# Patient Record
Sex: MALE | Race: WHITE | NOT HISPANIC OR LATINO | Employment: OTHER | URBAN - METROPOLITAN AREA
[De-identification: names, ages, dates, MRNs, and addresses within clinical notes are randomized per-mention and may not be internally consistent; named-entity substitution may affect disease eponyms.]

---

## 2017-01-23 ENCOUNTER — OFFICE VISIT - HEALTHEAST (OUTPATIENT)
Dept: CARDIOLOGY | Facility: CLINIC | Age: 67
End: 2017-01-23

## 2017-01-23 ENCOUNTER — AMBULATORY - HEALTHEAST (OUTPATIENT)
Dept: CARDIOLOGY | Facility: CLINIC | Age: 67
End: 2017-01-23

## 2017-01-23 DIAGNOSIS — I10 ESSENTIAL HYPERTENSION WITH GOAL BLOOD PRESSURE LESS THAN 130/80: ICD-10-CM

## 2017-01-23 DIAGNOSIS — Z95.0 PACEMAKER: ICD-10-CM

## 2017-01-23 DIAGNOSIS — E78.00 PURE HYPERCHOLESTEROLEMIA: ICD-10-CM

## 2017-01-23 ASSESSMENT — MIFFLIN-ST. JEOR: SCORE: 1791.12

## 2017-02-02 ENCOUNTER — RECORDS - HEALTHEAST (OUTPATIENT)
Dept: ADMINISTRATIVE | Facility: OTHER | Age: 67
End: 2017-02-02

## 2017-02-14 ENCOUNTER — RECORDS - HEALTHEAST (OUTPATIENT)
Dept: ADMINISTRATIVE | Facility: OTHER | Age: 67
End: 2017-02-14

## 2017-02-28 ENCOUNTER — OFFICE VISIT - HEALTHEAST (OUTPATIENT)
Dept: INTERNAL MEDICINE | Facility: CLINIC | Age: 67
End: 2017-02-28

## 2017-02-28 DIAGNOSIS — N28.9 RENAL INSUFFICIENCY, MILD: ICD-10-CM

## 2017-02-28 DIAGNOSIS — I10 ESSENTIAL HYPERTENSION WITH GOAL BLOOD PRESSURE LESS THAN 130/80: ICD-10-CM

## 2017-02-28 DIAGNOSIS — E11.9 DM (DIABETES MELLITUS), TYPE 2 (H): ICD-10-CM

## 2017-02-28 LAB — HBA1C MFR BLD: 6.3 % (ref 3.5–6)

## 2017-03-01 ENCOUNTER — RECORDS - HEALTHEAST (OUTPATIENT)
Dept: ADMINISTRATIVE | Facility: OTHER | Age: 67
End: 2017-03-01

## 2017-04-24 ENCOUNTER — COMMUNICATION - HEALTHEAST (OUTPATIENT)
Dept: INTERNAL MEDICINE | Facility: CLINIC | Age: 67
End: 2017-04-24

## 2017-04-24 ENCOUNTER — AMBULATORY - HEALTHEAST (OUTPATIENT)
Dept: CARDIOLOGY | Facility: CLINIC | Age: 67
End: 2017-04-24

## 2017-04-24 DIAGNOSIS — E78.00 PURE HYPERCHOLESTEROLEMIA: ICD-10-CM

## 2017-04-24 DIAGNOSIS — I10 ESSENTIAL HYPERTENSION: ICD-10-CM

## 2017-04-24 DIAGNOSIS — Z95.0 PACEMAKER: ICD-10-CM

## 2017-07-07 ENCOUNTER — OFFICE VISIT - HEALTHEAST (OUTPATIENT)
Dept: FAMILY MEDICINE | Facility: CLINIC | Age: 67
End: 2017-07-07
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
## 2017-07-31 ENCOUNTER — RECORDS - HEALTHEAST (OUTPATIENT)
Dept: ADMINISTRATIVE | Facility: OTHER | Age: 67
End: 2017-07-31

## 2017-07-31 ENCOUNTER — AMBULATORY - HEALTHEAST (OUTPATIENT)
Dept: CARDIOLOGY | Facility: CLINIC | Age: 67
End: 2017-07-31

## 2017-07-31 DIAGNOSIS — Z95.0 PACEMAKER: ICD-10-CM

## 2017-07-31 LAB — HCC DEVICE COMMENTS: NORMAL

## 2017-08-09 ENCOUNTER — RECORDS - HEALTHEAST (OUTPATIENT)
Dept: ADMINISTRATIVE | Facility: OTHER | Age: 67
End: 2017-08-09

## 2017-08-22 ENCOUNTER — RECORDS - HEALTHEAST (OUTPATIENT)
Dept: ADMINISTRATIVE | Facility: OTHER | Age: 67
End: 2017-08-22

## 2017-09-07 ENCOUNTER — COMMUNICATION - HEALTHEAST (OUTPATIENT)
Dept: INTERNAL MEDICINE | Facility: CLINIC | Age: 67
End: 2017-09-07

## 2017-09-07 DIAGNOSIS — E78.00 PURE HYPERCHOLESTEROLEMIA: ICD-10-CM

## 2017-10-27 ENCOUNTER — OFFICE VISIT - HEALTHEAST (OUTPATIENT)
Dept: CARDIOLOGY | Facility: CLINIC | Age: 67
End: 2017-10-27

## 2017-10-27 DIAGNOSIS — I10 ESSENTIAL HYPERTENSION WITH GOAL BLOOD PRESSURE LESS THAN 130/80: ICD-10-CM

## 2017-10-27 DIAGNOSIS — Z95.0 PACEMAKER: ICD-10-CM

## 2017-10-27 ASSESSMENT — MIFFLIN-ST. JEOR: SCORE: 1797.93

## 2017-10-28 ENCOUNTER — AMBULATORY - HEALTHEAST (OUTPATIENT)
Dept: NURSING | Facility: CLINIC | Age: 67
End: 2017-10-28

## 2017-10-28 DIAGNOSIS — Z00.00 HEALTH CARE MAINTENANCE: ICD-10-CM

## 2017-11-06 ENCOUNTER — AMBULATORY - HEALTHEAST (OUTPATIENT)
Dept: CARDIOLOGY | Facility: CLINIC | Age: 67
End: 2017-11-06

## 2017-11-06 DIAGNOSIS — Z95.0 PACEMAKER: ICD-10-CM

## 2017-11-07 LAB — HCC DEVICE COMMENTS: NORMAL

## 2017-11-27 ENCOUNTER — AMBULATORY - HEALTHEAST (OUTPATIENT)
Dept: CARDIOLOGY | Facility: CLINIC | Age: 67
End: 2017-11-27

## 2017-11-27 ENCOUNTER — COMMUNICATION - HEALTHEAST (OUTPATIENT)
Dept: CARDIOLOGY | Facility: CLINIC | Age: 67
End: 2017-11-27

## 2017-11-27 DIAGNOSIS — I10 HYPERTENSION: ICD-10-CM

## 2017-11-27 DIAGNOSIS — I10 ESSENTIAL HYPERTENSION: ICD-10-CM

## 2017-11-30 ENCOUNTER — OFFICE VISIT - HEALTHEAST (OUTPATIENT)
Dept: INTERNAL MEDICINE | Facility: CLINIC | Age: 67
End: 2017-11-30

## 2017-11-30 DIAGNOSIS — I10 ESSENTIAL HYPERTENSION WITH GOAL BLOOD PRESSURE LESS THAN 130/80: ICD-10-CM

## 2017-11-30 DIAGNOSIS — N40.1 BENIGN PROSTATIC HYPERPLASIA WITH URINARY OBSTRUCTION: ICD-10-CM

## 2017-11-30 DIAGNOSIS — E78.00 PURE HYPERCHOLESTEROLEMIA: ICD-10-CM

## 2017-11-30 DIAGNOSIS — Z00.00 ROUTINE GENERAL MEDICAL EXAMINATION AT A HEALTH CARE FACILITY: ICD-10-CM

## 2017-11-30 DIAGNOSIS — E11.9 DM (DIABETES MELLITUS), TYPE 2 (H): ICD-10-CM

## 2017-11-30 DIAGNOSIS — N13.8 BENIGN PROSTATIC HYPERPLASIA WITH URINARY OBSTRUCTION: ICD-10-CM

## 2017-11-30 DIAGNOSIS — Z95.0 PACEMAKER: ICD-10-CM

## 2017-11-30 DIAGNOSIS — R31.9 HEMATURIA: ICD-10-CM

## 2017-11-30 DIAGNOSIS — N20.0 NEPHROLITHIASIS: ICD-10-CM

## 2017-11-30 LAB — HBA1C MFR BLD: 6.4 % (ref 3.5–6)

## 2017-11-30 ASSESSMENT — MIFFLIN-ST. JEOR: SCORE: 1768.75

## 2017-12-01 ENCOUNTER — COMMUNICATION - HEALTHEAST (OUTPATIENT)
Dept: INTERNAL MEDICINE | Facility: CLINIC | Age: 67
End: 2017-12-01

## 2018-01-14 ENCOUNTER — COMMUNICATION - HEALTHEAST (OUTPATIENT)
Dept: INTERNAL MEDICINE | Facility: CLINIC | Age: 68
End: 2018-01-14

## 2018-01-17 ENCOUNTER — COMMUNICATION - HEALTHEAST (OUTPATIENT)
Dept: INTERNAL MEDICINE | Facility: CLINIC | Age: 68
End: 2018-01-17

## 2018-01-17 DIAGNOSIS — E11.9 DM (DIABETES MELLITUS), TYPE 2 (H): ICD-10-CM

## 2018-02-06 ENCOUNTER — AMBULATORY - HEALTHEAST (OUTPATIENT)
Dept: CARDIOLOGY | Facility: CLINIC | Age: 68
End: 2018-02-06

## 2018-02-06 DIAGNOSIS — Z95.0 CARDIAC PACEMAKER IN SITU: ICD-10-CM

## 2018-02-06 LAB — HCC DEVICE COMMENTS: NORMAL

## 2018-02-06 ASSESSMENT — MIFFLIN-ST. JEOR: SCORE: 1737.25

## 2018-02-08 ENCOUNTER — COMMUNICATION - HEALTHEAST (OUTPATIENT)
Dept: INTERNAL MEDICINE | Facility: CLINIC | Age: 68
End: 2018-02-08

## 2018-02-08 DIAGNOSIS — E11.9 DM (DIABETES MELLITUS), TYPE 2 (H): ICD-10-CM

## 2018-02-09 ENCOUNTER — AMBULATORY - HEALTHEAST (OUTPATIENT)
Dept: CARDIOLOGY | Facility: CLINIC | Age: 68
End: 2018-02-09

## 2018-02-09 ENCOUNTER — COMMUNICATION - HEALTHEAST (OUTPATIENT)
Dept: CARDIOLOGY | Facility: CLINIC | Age: 68
End: 2018-02-09

## 2018-02-09 DIAGNOSIS — R94.31 ABNORMAL ELECTROCARDIOGRAM: ICD-10-CM

## 2018-02-09 DIAGNOSIS — Z95.0 PACEMAKER: ICD-10-CM

## 2018-02-12 ENCOUNTER — HOSPITAL ENCOUNTER (OUTPATIENT)
Dept: CARDIOLOGY | Facility: CLINIC | Age: 68
Discharge: HOME | End: 2018-02-12
Attending: INTERNAL MEDICINE

## 2018-02-12 DIAGNOSIS — Z95.0 PACEMAKER: ICD-10-CM

## 2018-02-12 DIAGNOSIS — R94.31 ABNORMAL ELECTROCARDIOGRAM: ICD-10-CM

## 2018-02-12 LAB
CV STRESS CURRENT BP HE: NORMAL
CV STRESS CURRENT HR HE: 103
CV STRESS CURRENT HR HE: 104
CV STRESS CURRENT HR HE: 106
CV STRESS CURRENT HR HE: 107
CV STRESS CURRENT HR HE: 118
CV STRESS CURRENT HR HE: 118
CV STRESS CURRENT HR HE: 121
CV STRESS CURRENT HR HE: 121
CV STRESS CURRENT HR HE: 124
CV STRESS CURRENT HR HE: 128
CV STRESS CURRENT HR HE: 128
CV STRESS CURRENT HR HE: 137
CV STRESS CURRENT HR HE: 138
CV STRESS CURRENT HR HE: 138
CV STRESS CURRENT HR HE: 75
CV STRESS CURRENT HR HE: 78
CV STRESS CURRENT HR HE: 79
CV STRESS CURRENT HR HE: 79
CV STRESS CURRENT HR HE: 81
CV STRESS CURRENT HR HE: 82
CV STRESS CURRENT HR HE: 82
CV STRESS CURRENT HR HE: 86
CV STRESS CURRENT HR HE: 88
CV STRESS CURRENT HR HE: 88
CV STRESS CURRENT HR HE: 93
CV STRESS CURRENT HR HE: 94
CV STRESS CURRENT HR HE: 97
CV STRESS DEVIATION TIME HE: NORMAL
CV STRESS ECHO PERCENT HR HE: NORMAL
CV STRESS EXERCISE STAGE HE: NORMAL
CV STRESS FINAL RESTING BP HE: NORMAL
CV STRESS FINAL RESTING HR HE: 88
CV STRESS MAX HR HE: 140
CV STRESS MAX TREADMILL GRADE HE: 14
CV STRESS MAX TREADMILL SPEED HE: 3.4
CV STRESS PEAK DIA BP HE: NORMAL
CV STRESS PEAK SYS BP HE: NORMAL
CV STRESS PHASE HE: NORMAL
CV STRESS PROTOCOL HE: NORMAL
CV STRESS RESTING PT POSITION HE: NORMAL
CV STRESS RESTING PT POSITION HE: NORMAL
CV STRESS ST DEVIATION AMOUNT HE: NORMAL
CV STRESS ST DEVIATION ELEVATION HE: NORMAL
CV STRESS ST EVELATION AMOUNT HE: NORMAL
CV STRESS TEST TYPE HE: NORMAL
CV STRESS TOTAL STAGE TIME MIN 1 HE: NORMAL
ECHO EJECTION FRACTION ESTIMATED: 55 %
STRESS ECHO BASELINE BP: NORMAL
STRESS ECHO BASELINE HR: 75
STRESS ECHO CALCULATED PERCENT HR: 93 %
STRESS ECHO LAST STRESS BP: NORMAL
STRESS ECHO LAST STRESS HR: 138
STRESS ECHO POST ESTIMATED WORKLOAD: 9.1
STRESS ECHO POST EXERCISE DUR MIN: 7
STRESS ECHO POST EXERCISE DUR SEC: 30
STRESS ECHO TARGET HR: 128

## 2018-02-18 ENCOUNTER — AMBULATORY - HEALTHEAST (OUTPATIENT)
Dept: CARDIOLOGY | Facility: CLINIC | Age: 68
End: 2018-02-18

## 2018-02-19 ENCOUNTER — COMMUNICATION - HEALTHEAST (OUTPATIENT)
Dept: CARDIOLOGY | Facility: CLINIC | Age: 68
End: 2018-02-19

## 2018-02-19 DIAGNOSIS — I10 HTN (HYPERTENSION): ICD-10-CM

## 2018-02-26 ENCOUNTER — RECORDS - HEALTHEAST (OUTPATIENT)
Dept: ADMINISTRATIVE | Facility: OTHER | Age: 68
End: 2018-02-26

## 2018-02-27 ENCOUNTER — COMMUNICATION - HEALTHEAST (OUTPATIENT)
Dept: INTERNAL MEDICINE | Facility: CLINIC | Age: 68
End: 2018-02-27

## 2018-03-19 ENCOUNTER — COMMUNICATION - HEALTHEAST (OUTPATIENT)
Dept: CARDIOLOGY | Facility: CLINIC | Age: 68
End: 2018-03-19

## 2018-03-19 DIAGNOSIS — I10 HTN (HYPERTENSION): ICD-10-CM

## 2018-03-26 ENCOUNTER — COMMUNICATION - HEALTHEAST (OUTPATIENT)
Dept: CARDIOLOGY | Facility: CLINIC | Age: 68
End: 2018-03-26

## 2018-03-30 ENCOUNTER — OFFICE VISIT - HEALTHEAST (OUTPATIENT)
Dept: INTERNAL MEDICINE | Facility: CLINIC | Age: 68
End: 2018-03-30

## 2018-03-30 DIAGNOSIS — I10 ESSENTIAL HYPERTENSION WITH GOAL BLOOD PRESSURE LESS THAN 130/80: ICD-10-CM

## 2018-03-30 DIAGNOSIS — E11.9 DM (DIABETES MELLITUS), TYPE 2 (H): ICD-10-CM

## 2018-03-30 LAB — HBA1C MFR BLD: 6 % (ref 3.5–6)

## 2018-04-24 ENCOUNTER — OFFICE VISIT - HEALTHEAST (OUTPATIENT)
Dept: CARDIOLOGY | Facility: CLINIC | Age: 68
End: 2018-04-24

## 2018-04-24 DIAGNOSIS — I51.7 RIGHT VENTRICULAR ENLARGEMENT: ICD-10-CM

## 2018-04-24 DIAGNOSIS — Z95.0 CARDIAC PACEMAKER IN SITU: ICD-10-CM

## 2018-04-24 DIAGNOSIS — I10 ESSENTIAL HYPERTENSION WITH GOAL BLOOD PRESSURE LESS THAN 130/80: ICD-10-CM

## 2018-04-24 ASSESSMENT — MIFFLIN-ST. JEOR: SCORE: 1723.65

## 2018-04-26 ENCOUNTER — COMMUNICATION - HEALTHEAST (OUTPATIENT)
Dept: INTERNAL MEDICINE | Facility: CLINIC | Age: 68
End: 2018-04-26

## 2018-04-26 DIAGNOSIS — E11.9 DM (DIABETES MELLITUS), TYPE 2 (H): ICD-10-CM

## 2018-05-01 ENCOUNTER — AMBULATORY - HEALTHEAST (OUTPATIENT)
Dept: CARDIOLOGY | Facility: CLINIC | Age: 68
End: 2018-05-01

## 2018-05-01 DIAGNOSIS — Z95.0 CARDIAC PACEMAKER IN SITU: ICD-10-CM

## 2018-05-01 LAB
HCC DEVICE COMMENTS: NORMAL
HCC DEVICE IMPLANTING PROVIDER: NORMAL
HCC DEVICE MANUFACTURE: NORMAL
HCC DEVICE MODEL: NORMAL
HCC DEVICE SERIAL NUMBER: NORMAL
HCC DEVICE TYPE: NORMAL

## 2018-05-07 ENCOUNTER — HOSPITAL ENCOUNTER (OUTPATIENT)
Dept: CARDIOLOGY | Facility: CLINIC | Age: 68
Discharge: HOME | End: 2018-05-07
Attending: INTERNAL MEDICINE

## 2018-05-07 DIAGNOSIS — I51.7 RIGHT VENTRICULAR ENLARGEMENT: ICD-10-CM

## 2018-05-07 LAB
AORTIC ROOT: 4.1 CM
ASCENDING AORTA: 4 CM
BSA FOR ECHO PROCEDURE: 2.19 M2
CV BLOOD PRESSURE: NORMAL MMHG
CV ECHO HEIGHT: 69.3 IN
CV ECHO WEIGHT: 217 LBS
DOP CALC LVOT AREA: 4.15 CM2
DOP CALC LVOT DIAMETER: 2.3 CM
DOP CALC LVOT PEAK VEL: 67.7 CM/S
DOP CALC LVOT STROKE VOLUME: 70.6 CM3
DOP CALCLVOT PEAK VEL VTI: 17 CM
EJECTION FRACTION: 52 % (ref 55–75)
FRACTIONAL SHORTENING: 33.5 % (ref 28–44)
INTERVENTRICULAR SEPTUM IN END DIASTOLE: 1.05 CM (ref 0.6–1)
IVS/PW RATIO: 1
LA AREA 1: 21.6 CM2
LA AREA 2: 18.1 CM2
LEFT ATRIUM LENGTH: 5.55 CM
LEFT ATRIUM SIZE: 3.6 CM
LEFT ATRIUM TO AORTIC ROOT RATIO: 0.88 NO UNITS
LEFT ATRIUM VOLUME INDEX: 27.3 ML/M2
LEFT ATRIUM VOLUME: 59.9 ML
LEFT VENTRICLE CARDIAC INDEX: 1.9 L/MIN/M2
LEFT VENTRICLE CARDIAC OUTPUT: 4.2 L/MIN
LEFT VENTRICLE DIASTOLIC VOLUME INDEX: 37.4 CM3/M2 (ref 34–74)
LEFT VENTRICLE DIASTOLIC VOLUME: 82 CM3 (ref 62–150)
LEFT VENTRICLE HEART RATE: 60 BPM
LEFT VENTRICLE MASS INDEX: 73.9 G/M2
LEFT VENTRICLE SYSTOLIC VOLUME INDEX: 17.8 CM3/M2 (ref 11–31)
LEFT VENTRICLE SYSTOLIC VOLUME: 39 CM3 (ref 21–61)
LEFT VENTRICULAR INTERNAL DIMENSION IN DIASTOLE: 4.48 CM (ref 4.2–5.8)
LEFT VENTRICULAR INTERNAL DIMENSION IN SYSTOLE: 2.98 CM (ref 2.5–4)
LEFT VENTRICULAR MASS: 161.7 G
LEFT VENTRICULAR OUTFLOW TRACT MEAN GRADIENT: 1 MMHG
LEFT VENTRICULAR OUTFLOW TRACT MEAN VELOCITY: 48.7 CM/S
LEFT VENTRICULAR OUTFLOW TRACT PEAK GRADIENT: 2 MMHG
LEFT VENTRICULAR POSTERIOR WALL IN END DIASTOLE: 1.04 CM (ref 0.6–1)
LV STROKE VOLUME INDEX: 32.2 ML/M2
MITRAL VALVE E/A RATIO: 0.5
MV AVERAGE E/E' RATIO: 8.1 CM/S
MV DECELERATION TIME: 394 MS
MV E'TISSUE VEL-LAT: 5.56 CM/S
MV E'TISSUE VEL-MED: 5.46 CM/S
MV LATERAL E/E' RATIO: 8.1
MV MEDIAL E/E' RATIO: 8.2
MV PEAK A VELOCITY: 95.3 CM/S
MV PEAK E VELOCITY: 44.9 CM/S
NUC REST DIASTOLIC VOLUME INDEX: 3472 LBS
NUC REST SYSTOLIC VOLUME INDEX: 69.25 IN
PR MAX PG: 3 MMHG
PR PEAK VELOCITY: 81.4 CM/S
RIGHT VENTRICULAR INTERNAL DIMENSION IN DYSTOLE: 3.08 CM
TRICUSPID VALVE ANULAR PLANE SYSTOLIC EXCURSION: 1.6 CM

## 2018-05-07 ASSESSMENT — MIFFLIN-ST. JEOR: SCORE: 1723.65

## 2018-05-30 ENCOUNTER — COMMUNICATION - HEALTHEAST (OUTPATIENT)
Dept: INTERNAL MEDICINE | Facility: CLINIC | Age: 68
End: 2018-05-30

## 2018-06-14 ENCOUNTER — OFFICE VISIT - HEALTHEAST (OUTPATIENT)
Dept: INTERNAL MEDICINE | Facility: CLINIC | Age: 68
End: 2018-06-14
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
## 2018-06-14 DIAGNOSIS — S30.861A TICK BITE OF ABDOMINAL WALL, INITIAL ENCOUNTER: ICD-10-CM

## 2018-07-18 ENCOUNTER — OFFICE VISIT - HEALTHEAST (OUTPATIENT)
Dept: INTERNAL MEDICINE | Facility: CLINIC | Age: 68
End: 2018-07-18

## 2018-07-18 DIAGNOSIS — E11.9 DM (DIABETES MELLITUS), TYPE 2 (H): ICD-10-CM

## 2018-07-18 DIAGNOSIS — N40.1 BENIGN PROSTATIC HYPERPLASIA WITH URINARY OBSTRUCTION: ICD-10-CM

## 2018-07-18 DIAGNOSIS — Z51.81 MEDICATION MONITORING ENCOUNTER: ICD-10-CM

## 2018-07-18 DIAGNOSIS — Z95.0 CARDIAC PACEMAKER IN SITU: ICD-10-CM

## 2018-07-18 DIAGNOSIS — I10 ESSENTIAL HYPERTENSION WITH GOAL BLOOD PRESSURE LESS THAN 130/80: ICD-10-CM

## 2018-07-18 DIAGNOSIS — Z86.0100 HISTORY OF COLONIC POLYPS: ICD-10-CM

## 2018-07-18 DIAGNOSIS — N13.8 BENIGN PROSTATIC HYPERPLASIA WITH URINARY OBSTRUCTION: ICD-10-CM

## 2018-07-18 DIAGNOSIS — E78.00 PURE HYPERCHOLESTEROLEMIA: ICD-10-CM

## 2018-07-18 LAB
ALBUMIN SERPL-MCNC: 4 G/DL (ref 3.5–5)
ALP SERPL-CCNC: 67 U/L (ref 45–120)
ALT SERPL W P-5'-P-CCNC: 20 U/L (ref 0–45)
ANION GAP SERPL CALCULATED.3IONS-SCNC: 10 MMOL/L (ref 5–18)
AST SERPL W P-5'-P-CCNC: 18 U/L (ref 0–40)
BILIRUB SERPL-MCNC: 0.5 MG/DL (ref 0–1)
BUN SERPL-MCNC: 21 MG/DL (ref 8–28)
CALCIUM SERPL-MCNC: 9.4 MG/DL (ref 8.5–10.5)
CHLORIDE BLD-SCNC: 106 MMOL/L (ref 98–107)
CO2 SERPL-SCNC: 24 MMOL/L (ref 22–31)
CREAT SERPL-MCNC: 1.34 MG/DL (ref 0.7–1.3)
GFR SERPL CREATININE-BSD FRML MDRD: 53 ML/MIN/1.73M2
GLUCOSE BLD-MCNC: 110 MG/DL (ref 70–125)
HBA1C MFR BLD: 6.3 % (ref 3.5–6)
LDLC SERPL CALC-MCNC: 49 MG/DL
POTASSIUM BLD-SCNC: 4.8 MMOL/L (ref 3.5–5)
PROT SERPL-MCNC: 6.6 G/DL (ref 6–8)
SODIUM SERPL-SCNC: 140 MMOL/L (ref 136–145)

## 2018-07-18 ASSESSMENT — MIFFLIN-ST. JEOR: SCORE: 1710.04

## 2018-07-19 ENCOUNTER — COMMUNICATION - HEALTHEAST (OUTPATIENT)
Dept: INTERNAL MEDICINE | Facility: CLINIC | Age: 68
End: 2018-07-19

## 2018-07-31 ENCOUNTER — COMMUNICATION - HEALTHEAST (OUTPATIENT)
Dept: INTERNAL MEDICINE | Facility: CLINIC | Age: 68
End: 2018-07-31

## 2018-07-31 DIAGNOSIS — E11.9 DM (DIABETES MELLITUS), TYPE 2 (H): ICD-10-CM

## 2018-08-06 ENCOUNTER — AMBULATORY - HEALTHEAST (OUTPATIENT)
Dept: CARDIOLOGY | Facility: CLINIC | Age: 68
End: 2018-08-06

## 2018-08-06 DIAGNOSIS — Z95.0 CARDIAC PACEMAKER IN SITU: ICD-10-CM

## 2018-08-10 ENCOUNTER — RECORDS - HEALTHEAST (OUTPATIENT)
Dept: ADMINISTRATIVE | Facility: OTHER | Age: 68
End: 2018-08-10

## 2018-08-17 ENCOUNTER — RECORDS - HEALTHEAST (OUTPATIENT)
Dept: ADMINISTRATIVE | Facility: OTHER | Age: 68
End: 2018-08-17

## 2018-08-18 ENCOUNTER — COMMUNICATION - HEALTHEAST (OUTPATIENT)
Dept: INTERNAL MEDICINE | Facility: CLINIC | Age: 68
End: 2018-08-18

## 2018-08-18 DIAGNOSIS — E78.00 PURE HYPERCHOLESTEROLEMIA: ICD-10-CM

## 2018-08-28 ENCOUNTER — RECORDS - HEALTHEAST (OUTPATIENT)
Dept: ADMINISTRATIVE | Facility: OTHER | Age: 68
End: 2018-08-28

## 2018-10-26 ENCOUNTER — OFFICE VISIT - HEALTHEAST (OUTPATIENT)
Dept: INTERNAL MEDICINE | Facility: CLINIC | Age: 68
End: 2018-10-26

## 2018-10-26 DIAGNOSIS — I10 HTN (HYPERTENSION): ICD-10-CM

## 2018-10-26 DIAGNOSIS — Z00.00 HEALTH CARE MAINTENANCE: ICD-10-CM

## 2018-10-26 DIAGNOSIS — Z85.828 HISTORY OF BASAL CELL CANCER: ICD-10-CM

## 2018-10-26 DIAGNOSIS — Z51.81 MEDICATION MONITORING ENCOUNTER: ICD-10-CM

## 2018-10-26 DIAGNOSIS — E11.9 DM (DIABETES MELLITUS), TYPE 2 (H): ICD-10-CM

## 2018-10-26 DIAGNOSIS — E78.00 PURE HYPERCHOLESTEROLEMIA: ICD-10-CM

## 2018-10-26 DIAGNOSIS — Z86.0100 HISTORY OF COLONIC POLYPS: ICD-10-CM

## 2018-10-26 DIAGNOSIS — I10 ESSENTIAL HYPERTENSION WITH GOAL BLOOD PRESSURE LESS THAN 130/80: ICD-10-CM

## 2018-10-26 DIAGNOSIS — Z00.00 ROUTINE GENERAL MEDICAL EXAMINATION AT A HEALTH CARE FACILITY: ICD-10-CM

## 2018-10-26 LAB
ALBUMIN SERPL-MCNC: 4.1 G/DL (ref 3.5–5)
ALP SERPL-CCNC: 69 U/L (ref 45–120)
ALT SERPL W P-5'-P-CCNC: 24 U/L (ref 0–45)
ANION GAP SERPL CALCULATED.3IONS-SCNC: 11 MMOL/L (ref 5–18)
AST SERPL W P-5'-P-CCNC: 21 U/L (ref 0–40)
BILIRUB SERPL-MCNC: 0.7 MG/DL (ref 0–1)
BUN SERPL-MCNC: 26 MG/DL (ref 8–28)
CALCIUM SERPL-MCNC: 9.3 MG/DL (ref 8.5–10.5)
CHLORIDE BLD-SCNC: 105 MMOL/L (ref 98–107)
CHOLEST SERPL-MCNC: 106 MG/DL
CO2 SERPL-SCNC: 23 MMOL/L (ref 22–31)
CREAT SERPL-MCNC: 1.36 MG/DL (ref 0.7–1.3)
ERYTHROCYTE [DISTWIDTH] IN BLOOD BY AUTOMATED COUNT: 11.8 % (ref 11–14.5)
FASTING STATUS PATIENT QL REPORTED: YES
GFR SERPL CREATININE-BSD FRML MDRD: 52 ML/MIN/1.73M2
GLUCOSE BLD-MCNC: 128 MG/DL (ref 70–125)
HBA1C MFR BLD: 6.2 % (ref 3.5–6)
HCT VFR BLD AUTO: 37.7 % (ref 40–54)
HDLC SERPL-MCNC: 46 MG/DL
HGB BLD-MCNC: 13 G/DL (ref 14–18)
LDLC SERPL CALC-MCNC: 45 MG/DL
MCH RBC QN AUTO: 29.2 PG (ref 27–34)
MCHC RBC AUTO-ENTMCNC: 34.5 G/DL (ref 32–36)
MCV RBC AUTO: 85 FL (ref 80–100)
PLATELET # BLD AUTO: 202 THOU/UL (ref 140–440)
PMV BLD AUTO: 7.9 FL (ref 7–10)
POTASSIUM BLD-SCNC: 4.9 MMOL/L (ref 3.5–5)
PROT SERPL-MCNC: 6.7 G/DL (ref 6–8)
RBC # BLD AUTO: 4.45 MILL/UL (ref 4.4–6.2)
SODIUM SERPL-SCNC: 139 MMOL/L (ref 136–145)
TRIGL SERPL-MCNC: 73 MG/DL
WBC: 6.7 THOU/UL (ref 4–11)

## 2018-10-26 ASSESSMENT — MIFFLIN-ST. JEOR: SCORE: 1708.22

## 2018-10-29 ENCOUNTER — COMMUNICATION - HEALTHEAST (OUTPATIENT)
Dept: INTERNAL MEDICINE | Facility: CLINIC | Age: 68
End: 2018-10-29

## 2018-11-12 ENCOUNTER — AMBULATORY - HEALTHEAST (OUTPATIENT)
Dept: CARDIOLOGY | Facility: CLINIC | Age: 68
End: 2018-11-12

## 2018-11-12 DIAGNOSIS — Z95.0 CARDIAC PACEMAKER IN SITU: ICD-10-CM

## 2018-11-14 ENCOUNTER — COMMUNICATION - HEALTHEAST (OUTPATIENT)
Dept: INTERNAL MEDICINE | Facility: CLINIC | Age: 68
End: 2018-11-14

## 2018-11-14 DIAGNOSIS — E78.00 PURE HYPERCHOLESTEROLEMIA: ICD-10-CM

## 2018-11-14 DIAGNOSIS — E11.9 DM (DIABETES MELLITUS), TYPE 2 (H): ICD-10-CM

## 2018-12-07 ENCOUNTER — OFFICE VISIT - HEALTHEAST (OUTPATIENT)
Dept: FAMILY MEDICINE | Facility: CLINIC | Age: 68
End: 2018-12-07

## 2018-12-07 DIAGNOSIS — M54.41 ACUTE RIGHT-SIDED LOW BACK PAIN WITH RIGHT-SIDED SCIATICA: ICD-10-CM

## 2018-12-13 ENCOUNTER — RECORDS - HEALTHEAST (OUTPATIENT)
Dept: ADMINISTRATIVE | Facility: OTHER | Age: 68
End: 2018-12-13

## 2018-12-13 ENCOUNTER — HOSPITAL ENCOUNTER (OUTPATIENT)
Dept: CT IMAGING | Facility: CLINIC | Age: 68
Discharge: HOME | End: 2018-12-13
Attending: UROLOGY

## 2018-12-13 DIAGNOSIS — R31.0 GROSS HEMATURIA: ICD-10-CM

## 2018-12-13 LAB
CREAT BLD-MCNC: 1.4 MG/DL
POC GFR AMER AF HE - HISTORICAL: >60 ML/MIN/1.73M2
POC GFR NON AMER AF HE - HISTORICAL: 50 ML/MIN/1.73M2

## 2018-12-14 ENCOUNTER — COMMUNICATION - HEALTHEAST (OUTPATIENT)
Dept: CARDIOLOGY | Facility: CLINIC | Age: 68
End: 2018-12-14

## 2018-12-14 DIAGNOSIS — I10 ESSENTIAL HYPERTENSION: ICD-10-CM

## 2019-02-02 ENCOUNTER — OFFICE VISIT - HEALTHEAST (OUTPATIENT)
Dept: FAMILY MEDICINE | Facility: CLINIC | Age: 69
End: 2019-02-02

## 2019-02-02 DIAGNOSIS — H61.21 IMPACTED CERUMEN OF RIGHT EAR: ICD-10-CM

## 2019-02-02 DIAGNOSIS — H60.391 INFECTIVE OTITIS EXTERNA, RIGHT: ICD-10-CM

## 2019-02-26 ENCOUNTER — AMBULATORY - HEALTHEAST (OUTPATIENT)
Dept: CARDIOLOGY | Facility: CLINIC | Age: 69
End: 2019-02-26

## 2019-02-26 ENCOUNTER — OFFICE VISIT - HEALTHEAST (OUTPATIENT)
Dept: INTERNAL MEDICINE | Facility: CLINIC | Age: 69
End: 2019-02-26

## 2019-02-26 DIAGNOSIS — Z86.0100 HISTORY OF COLONIC POLYPS: ICD-10-CM

## 2019-02-26 DIAGNOSIS — Z85.828 HISTORY OF BASAL CELL CANCER: ICD-10-CM

## 2019-02-26 DIAGNOSIS — I10 ESSENTIAL HYPERTENSION: ICD-10-CM

## 2019-02-26 DIAGNOSIS — E78.00 PURE HYPERCHOLESTEROLEMIA: ICD-10-CM

## 2019-02-26 DIAGNOSIS — E11.9 DM (DIABETES MELLITUS), TYPE 2 (H): ICD-10-CM

## 2019-02-26 DIAGNOSIS — Z51.81 MEDICATION MONITORING ENCOUNTER: ICD-10-CM

## 2019-02-26 DIAGNOSIS — Z95.0 CARDIAC PACEMAKER IN SITU: ICD-10-CM

## 2019-02-26 LAB
ALBUMIN SERPL-MCNC: 4.2 G/DL (ref 3.5–5)
ALP SERPL-CCNC: 69 U/L (ref 45–120)
ALT SERPL W P-5'-P-CCNC: 23 U/L (ref 0–45)
ANION GAP SERPL CALCULATED.3IONS-SCNC: 13 MMOL/L (ref 5–18)
AST SERPL W P-5'-P-CCNC: 20 U/L (ref 0–40)
BILIRUB SERPL-MCNC: 0.5 MG/DL (ref 0–1)
BUN SERPL-MCNC: 25 MG/DL (ref 8–28)
CALCIUM SERPL-MCNC: 9.5 MG/DL (ref 8.5–10.5)
CHLORIDE BLD-SCNC: 105 MMOL/L (ref 98–107)
CO2 SERPL-SCNC: 22 MMOL/L (ref 22–31)
CREAT SERPL-MCNC: 1.52 MG/DL (ref 0.7–1.3)
GFR SERPL CREATININE-BSD FRML MDRD: 46 ML/MIN/1.73M2
GLUCOSE BLD-MCNC: 133 MG/DL (ref 70–125)
HBA1C MFR BLD: 6 % (ref 3.5–6)
HCC DEVICE COMMENTS: NORMAL
HCC DEVICE IMPLANTING PROVIDER: NORMAL
HCC DEVICE MANUFACTURE: NORMAL
HCC DEVICE MODEL: NORMAL
HCC DEVICE SERIAL NUMBER: NORMAL
HCC DEVICE TYPE: NORMAL
POTASSIUM BLD-SCNC: 4.7 MMOL/L (ref 3.5–5)
PROT SERPL-MCNC: 7 G/DL (ref 6–8)
SODIUM SERPL-SCNC: 140 MMOL/L (ref 136–145)

## 2019-02-26 ASSESSMENT — MIFFLIN-ST. JEOR: SCORE: 1732.72

## 2019-02-27 ENCOUNTER — OFFICE VISIT - HEALTHEAST (OUTPATIENT)
Dept: CARDIOLOGY | Facility: CLINIC | Age: 69
End: 2019-02-27

## 2019-02-27 ENCOUNTER — COMMUNICATION - HEALTHEAST (OUTPATIENT)
Dept: INTERNAL MEDICINE | Facility: CLINIC | Age: 69
End: 2019-02-27

## 2019-02-27 DIAGNOSIS — I10 ESSENTIAL HYPERTENSION: ICD-10-CM

## 2019-02-27 DIAGNOSIS — Z51.81 MEDICATION MONITORING ENCOUNTER: ICD-10-CM

## 2019-02-27 DIAGNOSIS — E78.00 PURE HYPERCHOLESTEROLEMIA: ICD-10-CM

## 2019-02-27 DIAGNOSIS — Z95.0 CARDIAC PACEMAKER IN SITU: ICD-10-CM

## 2019-02-27 LAB
CREAT UR-MCNC: 141.1 MG/DL
MICROALBUMIN UR-MCNC: 8.65 MG/DL (ref 0–1.99)
MICROALBUMIN/CREAT UR: 61.3 MG/G

## 2019-02-27 ASSESSMENT — MIFFLIN-ST. JEOR: SCORE: 1741.79

## 2019-02-28 ENCOUNTER — COMMUNICATION - HEALTHEAST (OUTPATIENT)
Dept: INTERNAL MEDICINE | Facility: CLINIC | Age: 69
End: 2019-02-28

## 2019-02-28 ENCOUNTER — AMBULATORY - HEALTHEAST (OUTPATIENT)
Dept: LAB | Facility: CLINIC | Age: 69
End: 2019-02-28

## 2019-02-28 DIAGNOSIS — Z51.81 ENCOUNTER FOR THERAPEUTIC DRUG MONITORING: ICD-10-CM

## 2019-02-28 DIAGNOSIS — Z51.81 MEDICATION MONITORING ENCOUNTER: ICD-10-CM

## 2019-03-05 ENCOUNTER — COMMUNICATION - HEALTHEAST (OUTPATIENT)
Dept: INTERNAL MEDICINE | Facility: CLINIC | Age: 69
End: 2019-03-05

## 2019-03-05 ENCOUNTER — AMBULATORY - HEALTHEAST (OUTPATIENT)
Dept: LAB | Facility: CLINIC | Age: 69
End: 2019-03-05

## 2019-03-05 DIAGNOSIS — Z51.81 ENCOUNTER FOR THERAPEUTIC DRUG MONITORING: ICD-10-CM

## 2019-03-05 LAB
ANION GAP SERPL CALCULATED.3IONS-SCNC: 10 MMOL/L (ref 5–18)
BUN SERPL-MCNC: 19 MG/DL (ref 8–28)
CALCIUM SERPL-MCNC: 9.5 MG/DL (ref 8.5–10.5)
CHLORIDE BLD-SCNC: 103 MMOL/L (ref 98–107)
CO2 SERPL-SCNC: 26 MMOL/L (ref 22–31)
CREAT SERPL-MCNC: 1.47 MG/DL (ref 0.7–1.3)
GFR SERPL CREATININE-BSD FRML MDRD: 47 ML/MIN/1.73M2
GLUCOSE BLD-MCNC: 215 MG/DL (ref 70–125)
POTASSIUM BLD-SCNC: 4.4 MMOL/L (ref 3.5–5)
SODIUM SERPL-SCNC: 139 MMOL/L (ref 136–145)

## 2019-03-20 ENCOUNTER — OFFICE VISIT - HEALTHEAST (OUTPATIENT)
Dept: INTERNAL MEDICINE | Facility: CLINIC | Age: 69
End: 2019-03-20

## 2019-03-20 DIAGNOSIS — I10 ESSENTIAL HYPERTENSION: ICD-10-CM

## 2019-03-20 DIAGNOSIS — I77.810 AORTIC ROOT DILATATION (H): ICD-10-CM

## 2019-03-20 DIAGNOSIS — E11.8 TYPE 2 DIABETES MELLITUS WITH COMPLICATION, WITHOUT LONG-TERM CURRENT USE OF INSULIN (H): ICD-10-CM

## 2019-03-20 DIAGNOSIS — Z51.81 MEDICATION MONITORING ENCOUNTER: ICD-10-CM

## 2019-03-20 LAB
ANION GAP SERPL CALCULATED.3IONS-SCNC: 14 MMOL/L (ref 5–18)
BUN SERPL-MCNC: 22 MG/DL (ref 8–28)
CALCIUM SERPL-MCNC: 9.7 MG/DL (ref 8.5–10.5)
CHLORIDE BLD-SCNC: 105 MMOL/L (ref 98–107)
CO2 SERPL-SCNC: 22 MMOL/L (ref 22–31)
CREAT SERPL-MCNC: 1.56 MG/DL (ref 0.7–1.3)
GFR SERPL CREATININE-BSD FRML MDRD: 44 ML/MIN/1.73M2
GLUCOSE BLD-MCNC: 173 MG/DL (ref 70–125)
POTASSIUM BLD-SCNC: 4.4 MMOL/L (ref 3.5–5)
SODIUM SERPL-SCNC: 141 MMOL/L (ref 136–145)

## 2019-03-20 RX ORDER — LOSARTAN POTASSIUM 25 MG/1
25 TABLET ORAL DAILY
Qty: 90 TABLET | Refills: 3 | Status: SHIPPED | OUTPATIENT
Start: 2019-03-20

## 2019-03-21 ENCOUNTER — COMMUNICATION - HEALTHEAST (OUTPATIENT)
Dept: INTERNAL MEDICINE | Facility: CLINIC | Age: 69
End: 2019-03-21

## 2019-04-26 ENCOUNTER — COMMUNICATION - HEALTHEAST (OUTPATIENT)
Dept: INTERNAL MEDICINE | Facility: CLINIC | Age: 69
End: 2019-04-26

## 2019-04-26 DIAGNOSIS — E11.9 DM (DIABETES MELLITUS), TYPE 2 (H): ICD-10-CM

## 2019-04-27 RX ORDER — BLOOD SUGAR DIAGNOSTIC
STRIP MISCELLANEOUS
Qty: 200 STRIP | Refills: 2 | Status: SHIPPED | OUTPATIENT
Start: 2019-04-27

## 2019-05-20 ENCOUNTER — AMBULATORY - HEALTHEAST (OUTPATIENT)
Dept: CARDIOLOGY | Facility: CLINIC | Age: 69
End: 2019-05-20

## 2019-05-20 DIAGNOSIS — Z95.0 CARDIAC PACEMAKER IN SITU: ICD-10-CM

## 2019-05-28 ENCOUNTER — COMMUNICATION - HEALTHEAST (OUTPATIENT)
Dept: CARDIOLOGY | Facility: CLINIC | Age: 69
End: 2019-05-28

## 2019-05-28 DIAGNOSIS — I10 HTN (HYPERTENSION): ICD-10-CM

## 2019-06-27 ENCOUNTER — OFFICE VISIT - HEALTHEAST (OUTPATIENT)
Dept: INTERNAL MEDICINE | Facility: CLINIC | Age: 69
End: 2019-06-27

## 2019-06-27 DIAGNOSIS — Z85.828 HISTORY OF BASAL CELL CANCER: ICD-10-CM

## 2019-06-27 DIAGNOSIS — Z51.81 MEDICATION MONITORING ENCOUNTER: ICD-10-CM

## 2019-06-27 DIAGNOSIS — E11.8 TYPE 2 DIABETES MELLITUS WITH COMPLICATION, WITHOUT LONG-TERM CURRENT USE OF INSULIN (H): ICD-10-CM

## 2019-06-27 DIAGNOSIS — I10 ESSENTIAL HYPERTENSION: ICD-10-CM

## 2019-06-27 DIAGNOSIS — I77.810 AORTIC ROOT DILATATION (H): ICD-10-CM

## 2019-06-27 DIAGNOSIS — Z86.0100 HISTORY OF COLONIC POLYPS: ICD-10-CM

## 2019-06-27 DIAGNOSIS — N18.2 CHRONIC RENAL INSUFFICIENCY, STAGE 2 (MILD): ICD-10-CM

## 2019-06-27 LAB
ALBUMIN SERPL-MCNC: 4.1 G/DL (ref 3.5–5)
ALP SERPL-CCNC: 56 U/L (ref 45–120)
ALT SERPL W P-5'-P-CCNC: 23 U/L (ref 0–45)
ANION GAP SERPL CALCULATED.3IONS-SCNC: 9 MMOL/L (ref 5–18)
AST SERPL W P-5'-P-CCNC: 19 U/L (ref 0–40)
BILIRUB SERPL-MCNC: 0.6 MG/DL (ref 0–1)
BUN SERPL-MCNC: 19 MG/DL (ref 8–28)
CALCIUM SERPL-MCNC: 9.8 MG/DL (ref 8.5–10.5)
CHLORIDE BLD-SCNC: 105 MMOL/L (ref 98–107)
CO2 SERPL-SCNC: 25 MMOL/L (ref 22–31)
CREAT SERPL-MCNC: 1.42 MG/DL (ref 0.7–1.3)
GFR SERPL CREATININE-BSD FRML MDRD: 49 ML/MIN/1.73M2
GLUCOSE BLD-MCNC: 92 MG/DL (ref 70–125)
HBA1C MFR BLD: 6.4 % (ref 3.5–6)
POTASSIUM BLD-SCNC: 4.7 MMOL/L (ref 3.5–5)
PROT SERPL-MCNC: 6.6 G/DL (ref 6–8)
SODIUM SERPL-SCNC: 139 MMOL/L (ref 136–145)

## 2019-06-28 ENCOUNTER — COMMUNICATION - HEALTHEAST (OUTPATIENT)
Dept: INTERNAL MEDICINE | Facility: CLINIC | Age: 69
End: 2019-06-28

## 2019-07-24 ENCOUNTER — OFFICE VISIT - HEALTHEAST (OUTPATIENT)
Dept: INTERNAL MEDICINE | Facility: CLINIC | Age: 69
End: 2019-07-24

## 2019-07-24 DIAGNOSIS — R07.89 CHEST PRESSURE: ICD-10-CM

## 2019-07-24 LAB
ALBUMIN SERPL-MCNC: 4.4 G/DL (ref 3.5–5)
ALP SERPL-CCNC: 65 U/L (ref 45–120)
ALT SERPL W P-5'-P-CCNC: 25 U/L (ref 0–45)
ANION GAP SERPL CALCULATED.3IONS-SCNC: 10 MMOL/L (ref 5–18)
AST SERPL W P-5'-P-CCNC: 19 U/L (ref 0–40)
ATRIAL RATE - MUSE: 70 BPM
BILIRUB SERPL-MCNC: 0.6 MG/DL (ref 0–1)
BUN SERPL-MCNC: 37 MG/DL (ref 8–28)
CALCIUM SERPL-MCNC: 10.2 MG/DL (ref 8.5–10.5)
CHLORIDE BLD-SCNC: 105 MMOL/L (ref 98–107)
CO2 SERPL-SCNC: 23 MMOL/L (ref 22–31)
CREAT SERPL-MCNC: 1.75 MG/DL (ref 0.7–1.3)
DIASTOLIC BLOOD PRESSURE - MUSE: NORMAL MMHG
ERYTHROCYTE [DISTWIDTH] IN BLOOD BY AUTOMATED COUNT: 12.8 % (ref 11–14.5)
GFR SERPL CREATININE-BSD FRML MDRD: 39 ML/MIN/1.73M2
GLUCOSE BLD-MCNC: 104 MG/DL (ref 70–125)
HCT VFR BLD AUTO: 40.5 % (ref 40–54)
HGB BLD-MCNC: 13.3 G/DL (ref 14–18)
INTERPRETATION ECG - MUSE: NORMAL
MCH RBC QN AUTO: 27.6 PG (ref 27–34)
MCHC RBC AUTO-ENTMCNC: 32.9 G/DL (ref 32–36)
MCV RBC AUTO: 84 FL (ref 80–100)
P AXIS - MUSE: 33 DEGREES
PLATELET # BLD AUTO: 216 THOU/UL (ref 140–440)
PMV BLD AUTO: 7.4 FL (ref 7–10)
POTASSIUM BLD-SCNC: 4.9 MMOL/L (ref 3.5–5)
PR INTERVAL - MUSE: 188 MS
PROT SERPL-MCNC: 7.1 G/DL (ref 6–8)
QRS DURATION - MUSE: 80 MS
QT - MUSE: 390 MS
QTC - MUSE: 421 MS
R AXIS - MUSE: -13 DEGREES
RBC # BLD AUTO: 4.83 MILL/UL (ref 4.4–6.2)
SODIUM SERPL-SCNC: 138 MMOL/L (ref 136–145)
SYSTOLIC BLOOD PRESSURE - MUSE: NORMAL MMHG
T AXIS - MUSE: 45 DEGREES
TROPONIN I SERPL-MCNC: <0.01 NG/ML (ref 0–0.29)
VENTRICULAR RATE- MUSE: 70 BPM
WBC: 9.2 THOU/UL (ref 4–11)

## 2019-07-24 ASSESSMENT — MIFFLIN-ST. JEOR: SCORE: 1736.47

## 2019-07-26 ENCOUNTER — COMMUNICATION - HEALTHEAST (OUTPATIENT)
Dept: INTERNAL MEDICINE | Facility: CLINIC | Age: 69
End: 2019-07-26

## 2019-08-21 ENCOUNTER — AMBULATORY - HEALTHEAST (OUTPATIENT)
Dept: CARDIOLOGY | Facility: CLINIC | Age: 69
End: 2019-08-21

## 2019-08-21 DIAGNOSIS — Z95.0 CARDIAC PACEMAKER IN SITU: ICD-10-CM

## 2019-09-05 ENCOUNTER — RECORDS - HEALTHEAST (OUTPATIENT)
Dept: HEALTH INFORMATION MANAGEMENT | Facility: CLINIC | Age: 69
End: 2019-09-05

## 2019-09-27 ENCOUNTER — OFFICE VISIT - HEALTHEAST (OUTPATIENT)
Dept: INTERNAL MEDICINE | Facility: CLINIC | Age: 69
End: 2019-09-27

## 2019-09-27 ENCOUNTER — COMMUNICATION - HEALTHEAST (OUTPATIENT)
Dept: INTERNAL MEDICINE | Facility: CLINIC | Age: 69
End: 2019-09-27

## 2019-09-27 DIAGNOSIS — R79.89 ELEVATED SERUM CREATININE: ICD-10-CM

## 2019-09-27 DIAGNOSIS — Z12.11 SCREEN FOR COLON CANCER: ICD-10-CM

## 2019-09-27 LAB
ANION GAP SERPL CALCULATED.3IONS-SCNC: 6 MMOL/L (ref 5–18)
BUN SERPL-MCNC: 24 MG/DL (ref 8–28)
CALCIUM SERPL-MCNC: 9.4 MG/DL (ref 8.5–10.5)
CHLORIDE BLD-SCNC: 105 MMOL/L (ref 98–107)
CO2 SERPL-SCNC: 26 MMOL/L (ref 22–31)
CREAT SERPL-MCNC: 1.32 MG/DL (ref 0.7–1.3)
GFR SERPL CREATININE-BSD FRML MDRD: 53 ML/MIN/1.73M2
GLUCOSE BLD-MCNC: 132 MG/DL (ref 70–125)
POTASSIUM BLD-SCNC: 4.7 MMOL/L (ref 3.5–5)
SODIUM SERPL-SCNC: 137 MMOL/L (ref 136–145)

## 2019-10-08 ENCOUNTER — COMMUNICATION - HEALTHEAST (OUTPATIENT)
Dept: ADMINISTRATIVE | Facility: CLINIC | Age: 69
End: 2019-10-08

## 2019-10-12 ENCOUNTER — AMBULATORY - HEALTHEAST (OUTPATIENT)
Dept: NURSING | Facility: CLINIC | Age: 69
End: 2019-10-12

## 2019-10-12 DIAGNOSIS — Z23 FLU VACCINE NEED: ICD-10-CM

## 2019-10-22 ENCOUNTER — RECORDS - HEALTHEAST (OUTPATIENT)
Dept: ADMINISTRATIVE | Facility: OTHER | Age: 69
End: 2019-10-22

## 2019-10-22 LAB — RETINOPATHY: NEGATIVE

## 2019-11-21 ENCOUNTER — AMBULATORY - HEALTHEAST (OUTPATIENT)
Dept: CARDIOLOGY | Facility: CLINIC | Age: 69
End: 2019-11-21

## 2019-11-21 DIAGNOSIS — I45.5 SINUS ARREST: ICD-10-CM

## 2019-11-21 DIAGNOSIS — Z95.0 CARDIAC PACEMAKER IN SITU: ICD-10-CM

## 2019-12-05 ENCOUNTER — COMMUNICATION - HEALTHEAST (OUTPATIENT)
Dept: INTERNAL MEDICINE | Facility: CLINIC | Age: 69
End: 2019-12-05

## 2019-12-05 DIAGNOSIS — E78.00 PURE HYPERCHOLESTEROLEMIA: ICD-10-CM

## 2019-12-05 RX ORDER — ROSUVASTATIN CALCIUM 10 MG/1
10 TABLET, COATED ORAL AT BEDTIME
Qty: 90 TABLET | Refills: 3 | Status: SHIPPED | OUTPATIENT
Start: 2019-12-05

## 2019-12-17 ENCOUNTER — COMMUNICATION - HEALTHEAST (OUTPATIENT)
Dept: INTERNAL MEDICINE | Facility: CLINIC | Age: 69
End: 2019-12-17

## 2019-12-18 ENCOUNTER — RECORDS - HEALTHEAST (OUTPATIENT)
Dept: GENERAL RADIOLOGY | Facility: CLINIC | Age: 69
End: 2019-12-18

## 2019-12-18 ENCOUNTER — OFFICE VISIT - HEALTHEAST (OUTPATIENT)
Dept: FAMILY MEDICINE | Facility: CLINIC | Age: 69
End: 2019-12-18

## 2019-12-18 ENCOUNTER — RECORDS - HEALTHEAST (OUTPATIENT)
Dept: HEALTH INFORMATION MANAGEMENT | Facility: CLINIC | Age: 69
End: 2019-12-18

## 2019-12-18 DIAGNOSIS — S99.921A INJURY OF TOE ON RIGHT FOOT, INITIAL ENCOUNTER: ICD-10-CM

## 2019-12-18 DIAGNOSIS — S99.921A UNSPECIFIED INJURY OF RIGHT FOOT, INITIAL ENCOUNTER: ICD-10-CM

## 2019-12-18 DIAGNOSIS — S92.534B OPEN NONDISPLACED FRACTURE OF DISTAL PHALANX OF LESSER TOE OF RIGHT FOOT, INITIAL ENCOUNTER: ICD-10-CM

## 2019-12-24 ENCOUNTER — OFFICE VISIT - HEALTHEAST (OUTPATIENT)
Dept: INTERNAL MEDICINE | Facility: CLINIC | Age: 69
End: 2019-12-24

## 2019-12-24 DIAGNOSIS — Z51.89 VISIT FOR WOUND CHECK: ICD-10-CM

## 2020-01-15 ENCOUNTER — RECORDS - HEALTHEAST (OUTPATIENT)
Dept: ADMINISTRATIVE | Facility: OTHER | Age: 70
End: 2020-01-15

## 2020-01-16 ENCOUNTER — RECORDS - HEALTHEAST (OUTPATIENT)
Dept: ADMINISTRATIVE | Facility: OTHER | Age: 70
End: 2020-01-16

## 2020-02-19 ENCOUNTER — AMBULATORY - HEALTHEAST (OUTPATIENT)
Dept: CARDIOLOGY | Facility: CLINIC | Age: 70
End: 2020-02-19

## 2020-02-19 ENCOUNTER — OFFICE VISIT - HEALTHEAST (OUTPATIENT)
Dept: CARDIOLOGY | Facility: CLINIC | Age: 70
End: 2020-02-19

## 2020-02-19 DIAGNOSIS — Z95.0 CARDIAC PACEMAKER IN SITU: ICD-10-CM

## 2020-02-19 DIAGNOSIS — I45.5 SINUS ARREST: ICD-10-CM

## 2020-02-19 DIAGNOSIS — I10 ESSENTIAL HYPERTENSION: ICD-10-CM

## 2020-02-19 DIAGNOSIS — R55 SYNCOPE: ICD-10-CM

## 2020-02-19 DIAGNOSIS — E78.00 PURE HYPERCHOLESTEROLEMIA: ICD-10-CM

## 2020-02-19 DIAGNOSIS — I51.7 RIGHT VENTRICULAR ENLARGEMENT: ICD-10-CM

## 2020-02-19 DIAGNOSIS — I77.89 AORTIC ROOT ENLARGEMENT (H): ICD-10-CM

## 2020-02-19 ASSESSMENT — MIFFLIN-ST. JEOR: SCORE: 1724.22

## 2020-03-04 ENCOUNTER — HOSPITAL ENCOUNTER (OUTPATIENT)
Dept: CARDIOLOGY | Facility: CLINIC | Age: 70
Discharge: HOME | End: 2020-03-04
Attending: INTERNAL MEDICINE

## 2020-03-04 DIAGNOSIS — I10 ESSENTIAL HYPERTENSION: ICD-10-CM

## 2020-03-04 DIAGNOSIS — R55 SYNCOPE: ICD-10-CM

## 2020-03-04 DIAGNOSIS — I51.7 RIGHT VENTRICULAR ENLARGEMENT: ICD-10-CM

## 2020-03-04 DIAGNOSIS — I77.89 AORTIC ROOT ENLARGEMENT (H): ICD-10-CM

## 2020-03-04 DIAGNOSIS — I45.5 SINUS ARREST: ICD-10-CM

## 2020-03-04 DIAGNOSIS — Z95.0 CARDIAC PACEMAKER IN SITU: ICD-10-CM

## 2020-03-04 DIAGNOSIS — E78.00 PURE HYPERCHOLESTEROLEMIA: ICD-10-CM

## 2020-03-04 ASSESSMENT — MIFFLIN-ST. JEOR: SCORE: 1724.22

## 2020-03-05 LAB
AORTIC ROOT: 3.2 CM
BSA FOR ECHO PROCEDURE: 2.19 M2
CV BLOOD PRESSURE: ABNORMAL MMHG
CV ECHO HEIGHT: 69 IN
CV ECHO WEIGHT: 218 LBS
DOP CALC LVOT PEAK VEL: 83.5 CM/S
DOP CALC MV VTI: 25.8 CM
DOP CALCLVOT PEAK VEL VTI: 19.4 CM
EJECTION FRACTION: 69 % (ref 55–75)
FRACTIONAL SHORTENING: 49 % (ref 28–44)
INTERVENTRICULAR SEPTUM IN END DIASTOLE: 1.13 CM (ref 0.6–1)
IVS/PW RATIO: 1
LA AREA 1: 21.6 CM2
LA AREA 2: 19.6 CM2
LEFT ATRIUM LENGTH: 5.79 CM
LEFT ATRIUM SIZE: 4.1 CM
LEFT ATRIUM TO AORTIC ROOT RATIO: 1.28 NO UNITS
LEFT ATRIUM VOLUME INDEX: 28.4 ML/M2
LEFT ATRIUM VOLUME: 62.2 ML
LEFT VENTRICLE DIASTOLIC VOLUME INDEX: 26.5 CM3/M2 (ref 34–74)
LEFT VENTRICLE DIASTOLIC VOLUME: 58 CM3 (ref 62–150)
LEFT VENTRICLE HEART RATE: 64 BPM
LEFT VENTRICLE MASS INDEX: 100.6 G/M2
LEFT VENTRICLE SYSTOLIC VOLUME INDEX: 8.2 CM3/M2 (ref 11–31)
LEFT VENTRICLE SYSTOLIC VOLUME: 18 CM3 (ref 21–61)
LEFT VENTRICULAR INTERNAL DIMENSION IN DIASTOLE: 5.02 CM (ref 4.2–5.8)
LEFT VENTRICULAR INTERNAL DIMENSION IN SYSTOLE: 2.56 CM (ref 2.5–4)
LEFT VENTRICULAR MASS: 220.3 G
LEFT VENTRICULAR OUTFLOW TRACT MEAN GRADIENT: 2 MMHG
LEFT VENTRICULAR OUTFLOW TRACT MEAN VELOCITY: 65 CM/S
LEFT VENTRICULAR OUTFLOW TRACT PEAK GRADIENT: 3 MMHG
LEFT VENTRICULAR POSTERIOR WALL IN END DIASTOLE: 1.16 CM (ref 0.6–1)
MITRAL VALVE E/A RATIO: 0.8
MITRAL VALVE MEAN INFLOW VELOCITY: 54.1 CM/S
MITRAL VALVE PEAK VELOCITY: 93.7 CM/S
MV AVERAGE E/E' RATIO: 9 CM/S
MV DECELERATION TIME: 306 MS
MV E'TISSUE VEL-LAT: 7.99 CM/S
MV E'TISSUE VEL-MED: 6.34 CM/S
MV LATERAL E/E' RATIO: 8
MV MEAN GRADIENT: 1 MMHG
MV MEDIAL E/E' RATIO: 10.1
MV PEAK A VELOCITY: 81.4 CM/S
MV PEAK E VELOCITY: 64.2 CM/S
MV PEAK GRADIENT: 3.5 MMHG
NUC REST DIASTOLIC VOLUME INDEX: 3488 LBS
NUC REST SYSTOLIC VOLUME INDEX: 69 IN
PR MAX PG: 3 MMHG
PR PEAK VELOCITY: 84.2 CM/S
TRICUSPID VALVE ANULAR PLANE SYSTOLIC EXCURSION: 2.1 CM

## 2020-04-27 ENCOUNTER — OFFICE VISIT - HEALTHEAST (OUTPATIENT)
Dept: CARDIOLOGY | Facility: CLINIC | Age: 70
End: 2020-04-27

## 2020-04-27 DIAGNOSIS — R07.89 CHEST DISCOMFORT: ICD-10-CM

## 2020-05-13 ENCOUNTER — AMBULATORY - HEALTHEAST (OUTPATIENT)
Dept: CARDIOLOGY | Facility: CLINIC | Age: 70
End: 2020-05-13

## 2020-05-13 DIAGNOSIS — Z95.0 CARDIAC PACEMAKER IN SITU: ICD-10-CM

## 2020-05-13 DIAGNOSIS — I45.5 SINUS ARREST: ICD-10-CM

## 2020-06-20 ENCOUNTER — COMMUNICATION - HEALTHEAST (OUTPATIENT)
Dept: CARDIOLOGY | Facility: CLINIC | Age: 70
End: 2020-06-20

## 2020-06-20 DIAGNOSIS — I10 HTN (HYPERTENSION): ICD-10-CM

## 2020-07-24 ENCOUNTER — HOSPITAL ENCOUNTER (OUTPATIENT)
Dept: CARDIOLOGY | Facility: CLINIC | Age: 70
Discharge: HOME | End: 2020-07-24
Attending: INTERNAL MEDICINE

## 2020-07-24 DIAGNOSIS — R07.89 CHEST DISCOMFORT: ICD-10-CM

## 2020-07-24 LAB
CV STRESS CURRENT BP HE: NORMAL
CV STRESS CURRENT HR HE: 101
CV STRESS CURRENT HR HE: 102
CV STRESS CURRENT HR HE: 105
CV STRESS CURRENT HR HE: 106
CV STRESS CURRENT HR HE: 109
CV STRESS CURRENT HR HE: 113
CV STRESS CURRENT HR HE: 119
CV STRESS CURRENT HR HE: 120
CV STRESS CURRENT HR HE: 125
CV STRESS CURRENT HR HE: 126
CV STRESS CURRENT HR HE: 126
CV STRESS CURRENT HR HE: 131
CV STRESS CURRENT HR HE: 132
CV STRESS CURRENT HR HE: 135
CV STRESS CURRENT HR HE: 136
CV STRESS CURRENT HR HE: 66
CV STRESS CURRENT HR HE: 76
CV STRESS CURRENT HR HE: 80
CV STRESS CURRENT HR HE: 82
CV STRESS CURRENT HR HE: 83
CV STRESS CURRENT HR HE: 86
CV STRESS CURRENT HR HE: 87
CV STRESS CURRENT HR HE: 87
CV STRESS CURRENT HR HE: 88
CV STRESS CURRENT HR HE: 88
CV STRESS CURRENT HR HE: 90
CV STRESS CURRENT HR HE: 90
CV STRESS CURRENT HR HE: 96
CV STRESS DEVIATION TIME HE: NORMAL
CV STRESS ECHO PERCENT HR HE: NORMAL
CV STRESS EXERCISE STAGE HE: NORMAL
CV STRESS FINAL RESTING BP HE: NORMAL
CV STRESS FINAL RESTING HR HE: 90
CV STRESS MAX HR HE: 136
CV STRESS MAX TREADMILL GRADE HE: 16
CV STRESS MAX TREADMILL SPEED HE: 4.2
CV STRESS PEAK DIA BP HE: NORMAL
CV STRESS PEAK SYS BP HE: NORMAL
CV STRESS PHASE HE: NORMAL
CV STRESS PROTOCOL HE: NORMAL
CV STRESS RESTING PT POSITION HE: NORMAL
CV STRESS ST DEVIATION AMOUNT HE: NORMAL
CV STRESS ST DEVIATION ELEVATION HE: NORMAL
CV STRESS ST EVELATION AMOUNT HE: NORMAL
CV STRESS TEST TYPE HE: NORMAL
CV STRESS TOTAL STAGE TIME MIN 1 HE: NORMAL
RATE PRESSURE PRODUCT: NORMAL
STRESS ECHO BASELINE DIASTOLIC HE: 82
STRESS ECHO BASELINE HR: 78
STRESS ECHO BASELINE SYSTOLIC BP: 148
STRESS ECHO CALCULATED PERCENT HR: 92 %
STRESS ECHO LAST STRESS DIASTOLIC BP: 70
STRESS ECHO LAST STRESS HR: 135
STRESS ECHO LAST STRESS SYSTOLIC BP: 182
STRESS ECHO POST ESTIMATED WORKLOAD: 10.5
STRESS ECHO POST EXERCISE DUR MIN: 9
STRESS ECHO POST EXERCISE DUR SEC: 0
STRESS ECHO TARGET HR: 148

## 2020-07-26 ENCOUNTER — COMMUNICATION - HEALTHEAST (OUTPATIENT)
Dept: CARDIOLOGY | Facility: CLINIC | Age: 70
End: 2020-07-26

## 2020-08-17 ENCOUNTER — AMBULATORY - HEALTHEAST (OUTPATIENT)
Dept: CARDIOLOGY | Facility: CLINIC | Age: 70
End: 2020-08-17

## 2020-08-17 DIAGNOSIS — Z95.0 CARDIAC PACEMAKER IN SITU: ICD-10-CM

## 2020-08-17 DIAGNOSIS — I45.5 SINUS ARREST: ICD-10-CM

## 2020-11-16 ENCOUNTER — AMBULATORY - HEALTHEAST (OUTPATIENT)
Dept: CARDIOLOGY | Facility: CLINIC | Age: 70
End: 2020-11-16

## 2020-11-16 DIAGNOSIS — I45.5 SINUS ARREST: ICD-10-CM

## 2020-11-16 DIAGNOSIS — Z95.0 CARDIAC PACEMAKER IN SITU: ICD-10-CM

## 2020-12-21 ENCOUNTER — OFFICE VISIT - HEALTHEAST (OUTPATIENT)
Dept: CARDIOLOGY | Facility: CLINIC | Age: 70
End: 2020-12-21

## 2020-12-21 DIAGNOSIS — Z95.0 CARDIAC PACEMAKER IN SITU: ICD-10-CM

## 2020-12-21 DIAGNOSIS — E78.00 PURE HYPERCHOLESTEROLEMIA: ICD-10-CM

## 2020-12-21 DIAGNOSIS — R07.89 ATYPICAL CHEST PAIN: ICD-10-CM

## 2020-12-21 DIAGNOSIS — I10 ESSENTIAL HYPERTENSION: ICD-10-CM

## 2020-12-21 LAB
ATRIAL RATE - MUSE: 64 BPM
DIASTOLIC BLOOD PRESSURE - MUSE: NORMAL
INTERPRETATION ECG - MUSE: NORMAL
P AXIS - MUSE: 49 DEGREES
PR INTERVAL - MUSE: 192 MS
QRS DURATION - MUSE: 82 MS
QT - MUSE: 408 MS
QTC - MUSE: 420 MS
R AXIS - MUSE: 4 DEGREES
SYSTOLIC BLOOD PRESSURE - MUSE: NORMAL
T AXIS - MUSE: 51 DEGREES
TROPONIN I SERPL-MCNC: <0.01 NG/ML (ref 0–0.29)
VENTRICULAR RATE- MUSE: 64 BPM

## 2020-12-21 RX ORDER — NITROGLYCERIN 0.4 MG/1
0.4 TABLET SUBLINGUAL EVERY 5 MIN PRN
Qty: 25 TABLET | Refills: 1 | Status: SHIPPED | OUTPATIENT
Start: 2020-12-21

## 2020-12-21 ASSESSMENT — MIFFLIN-ST. JEOR: SCORE: 1697.01

## 2021-02-15 ENCOUNTER — AMBULATORY - HEALTHEAST (OUTPATIENT)
Dept: CARDIOLOGY | Facility: CLINIC | Age: 71
End: 2021-02-15

## 2021-02-15 DIAGNOSIS — I45.5 SINUS ARREST: ICD-10-CM

## 2021-02-15 DIAGNOSIS — Z95.0 CARDIAC PACEMAKER IN SITU: ICD-10-CM

## 2021-02-26 ENCOUNTER — COMMUNICATION - HEALTHEAST (OUTPATIENT)
Dept: CARDIOLOGY | Facility: CLINIC | Age: 71
End: 2021-02-26

## 2021-02-26 DIAGNOSIS — I10 HTN (HYPERTENSION): ICD-10-CM

## 2021-03-01 RX ORDER — METOPROLOL SUCCINATE 50 MG/1
TABLET, EXTENDED RELEASE ORAL
Qty: 45 TABLET | Refills: 1 | Status: SHIPPED | OUTPATIENT
Start: 2021-03-01 | End: 2022-11-03

## 2021-05-17 ENCOUNTER — AMBULATORY - HEALTHEAST (OUTPATIENT)
Dept: CARDIOLOGY | Facility: CLINIC | Age: 71
End: 2021-05-17

## 2021-05-17 DIAGNOSIS — I45.5 SINUS ARREST: ICD-10-CM

## 2021-05-17 DIAGNOSIS — Z95.0 CARDIAC PACEMAKER IN SITU: ICD-10-CM

## 2021-05-26 VITALS — DIASTOLIC BLOOD PRESSURE: 83 MMHG | HEART RATE: 80 BPM | SYSTOLIC BLOOD PRESSURE: 129 MMHG

## 2021-05-28 NOTE — TELEPHONE ENCOUNTER
Refill Approved    Rx renewed per Medication Renewal Policy. Medication was last renewed on 2/8/18, last OV 3/20/19.    Victoria Saleh, [facility] Triage/Med Refill 4/27/2019     Requested Prescriptions   Pending Prescriptions Disp Refills     ACCU-CHEK BILLY PLUS TEST STRP strips [Pharmacy Med Name: Accu-Chek Billy Plus In Vitro Strip]  2     Sig: TEST TWICE DAILY       Diabetic Supplies Refill Protocol Passed - 4/26/2019  6:51 PM        Passed - Visit with PCP or prescribing provider visit in last 6 months     Last office visit with prescriber/PCP: 3/30/2018 Boby Zamudio MD OR same dept: 3/20/2019 Neil Lomeli MD OR same specialty: 3/20/2019 Neil Lomeli MD  Last physical: 11/30/2017 Last MTM visit: Visit date not found   Next visit within 3 mo: Visit date not found  Next physical within 3 mo: Visit date not found  Prescriber OR PCP: Boby Zamudio MD  Last diagnosis associated with med order: 1. DM (diabetes mellitus), type 2 (H)  - ACCU-CHEK BILLY PLUS TEST STRP strips [Pharmacy Med Name: Accu-Chek Billy Plus In Vitro Strip]; TEST TWICE DAILY; Refill: 2    If protocol passes may refill for 12 months if within 3 months of last provider visit (or a total of 15 months).             Passed - A1C in last 6 months     Hemoglobin A1c   Date Value Ref Range Status   02/26/2019 6.0 3.5 - 6.0 % Final

## 2021-05-30 VITALS — BODY MASS INDEX: 33.22 KG/M2 | WEIGHT: 228.2 LBS

## 2021-05-30 VITALS — HEIGHT: 70 IN | BODY MASS INDEX: 33.07 KG/M2 | WEIGHT: 231 LBS

## 2021-05-30 NOTE — PROGRESS NOTES
[facility] clinic Follow Up Note    Dima Gilliland   69 y.o. male    Date of Visit: 6/27/2019    Chief Complaint   Patient presents with     Follow-up     Checkup on hypertension and discuss evaluating aortic root dilatation.     Subjective  Erwin is here for follow-up on diabetes and other medical issues.    Diabetes or means well-controlled on metformin thousand mill grams twice daily.  February 2019 hemoglobin A1c was 6.0%.  February 2019 micral Beman level was 61.    He does have some mild renal insufficiency.  His creatinine went up slightly to 1.5, and was stable in March at 1.56.    He did have a CT scan with IV contrast in December of last year.  He had some mild renal insufficiency previous to that.    In February of this year I lowered his losartan dose from 50 mg down to 25 mg a day.  His blood pressure remains controlled on a lower dose.  He has not checked his blood pressure on his own.    He is also on Toprol XL 25 mill grams a day with the past history of SVT.  History of sick sinus syndrome and has a pacemaker placed in 2015.  I did review the device check from May 2019 but only one mode switch for less than 1 minute.  No symptomatic palpitations.    No history of sleep apnea but no previous evaluation.    May 2018 heart echo was reviewed by me today, showed a 4.1 cm aortic root dilatation that was stable.  Ejection fraction 52%.  Normal mitral valve.  No aortic insufficiency.    February 2018 stress echo negative for ischemia.    No chest pain or chest pressure.    Continues to tolerate Crestor 10 mg a day without right upper quadrant pain or new myalgias.    No epigastric pain or bleeding on low-dose aspirin.    Cholesterol is well controlled in October of last year with an LDL of 45 and HDL 46.    Quit smoking 2006.  No new cough.    He did have hematuria December 2018 but had a negative cystoscopy and negative CT scan except for some small nonobstructing stones.  No new urinary issues  or hematuria.    Past history of colon polyps January 2015 with one polyp.  5-year follow-up.  No new bowel changes.    He did confirm that he saw dermatology this past winter for his yearly follow-up.  Past history of basal cell cancer.  He stated there is negative findings in 1 year follow-up given.    He does remain active with walking dog.  No falls.    Patient states he has an insurance issue and will not be able to return to this clinic because we are out of network.    PMHx:    Past Medical History:   Diagnosis Date     Diabetes mellitus, type II (H)      High cholesterol      History of chest pain 1/2013    per pt thought to be over-exertion while shoveling snow     Hypertension      Sinus bradycardia seen on cardiac monitor 3/26/15    asystole 15 sce intervals     SSS (sick sinus syndrome) (H) 10/15/2015     Syncope     Echo nl, GXT neg, MRI head neg, US carotid neg     PSHx:    Past Surgical History:   Procedure Laterality Date     CARDIAC PACEMAKER PLACEMENT  3/26/15    [facility]     Cystoscopy Laser Cystolitholapaxy  08/26/2014     CYSTOSCOPY PROSTATE W/ LASER N/A 9/9/2014    Procedure: CYSTOSCOPY TRANSURETHRAL RESECTION PROSTATE;  Surgeon: Rocky Goyal MD;  [place] [facility] [place]:      CYSTOSCOPY W/ LITHOLAPAXY / EHL N/A 8/26/2014    Procedure: HOLMIUN LASER CYSTOLITHOLAPAXY;  Surgeon: Rocky Goyal MD;  [place] [facility] [place]  Service:      GANGLION CYST EXCISION       INSERT / REPLACE / REMOVE PACEMAKER  2015     LAPAROSCOPIC CHOLECYSTECTOMY N/A 7/23/2014    Procedure: CHOLECYSTECTOMY LAPAROSCOPIC;  Surgeon: Brady Estevez MD;  Location: [facility];  Service:      Immunizations:   Immunization History   Administered Date(s) Administered     Influenza [id number], 01/20/2010     Influenza high dose, seasonal 10/15/2015, 10/11/2016, 10/28/2017, 10/26/2018     Influenza, Seasonal, Inj PF IIV3 10/07/2010, 10/11/2011, 01/10/2013     Influenza, inj,  historic,unspecified 11/02/2007, 10/08/2008     Influenza,seasonal quad, PF 11/16/2013, 09/22/2014     Pneumo Conj 13-V (2010&after) 01/22/2015     Pneumo Polysac 23-V 01/20/2010     Td,adult,historic,unspecified 10/16/2006, 09/26/2007     Tdap 10/11/2016     ZOSTER, LIVE 10/07/2010       ROS A comprehensive review of systems was performed and was otherwise negative    Medications, allergies, and problem list were reviewed and updated    Exam  /72 (Patient Site: Right Arm, Patient Position: Sitting, Cuff Size: Adult Regular)   Pulse 60   Wt 221 lb (100.2 kg)   BMI 32.40 kg/m    Alert and oriented x3.  Pupils are equal.  No jaundice or conjunctivitis.  No JVD.  Lungs are clear to auscultation with normal respiratory excursion.  Heart is regular without murmur.  Abdomen is mildly overweight but nontender.  No ankle edema.    Assessment/Plan  1. Type 2 diabetes mellitus with complication, without long-term current use of insulin (H)  Well-controlled.  Continue metformin twice daily.  Denies foot sores or neuropathy.  - Glycosylated Hemoglobin A1c    Cholesterol well controlled last October on current Crestor.  On low-dose aspirin.    2. Essential hypertension  Controlled on lower dose losartan 25 mg a day.    Continue metoprolol 25 mg a day, history of SVT.  Has pacemaker.    3. Chronic renal insufficiency, stage 2 (mild)  Mild increase in creatinine earlier this year, possibly associated with CT IV contrast.  Stable on recheck.  Recheck labs today.    Patient was told to follow-up with his new physician to continue to follow kidney function closely.    4. Aortic root dilatation (H)  Patient declined heart echo which was planned this spring, because of out of network issues with insurance.  He was told to discuss this with his new physician.  He would be due later this year for repeat echo to monitor aortic root dilatation.    5. History of basal cell cancer  Yearly follow-up this coming winter with  dermatology    6. History of colonic polyps  A 5-year follow-up due 2020    7. Medication monitoring encounter    - Comprehensive Metabolic Panel    Return in about 5 months (around 2019) for Annual physical.   Patient Instructions   No changes in medication.    You will be due for a physical exam at the end of the year.  Find a new physician, according to your insurance.    You will be due for a repeat heart echo later this year or early next year to follow-up on your aortic root dilatation.    Results of today's lab work will be mailed to you.        Neil Lomeli MD        Current Outpatient Medications   Medication Sig Dispense Refill     ACCU-CHEK BILLY PLUS TEST STRP strips TEST TWICE DAILY 200 strip 2     aspirin 81 MG EC tablet Take 81 mg by mouth daily.       generic lancets Check sugars once daily 2 each 6     losartan (COZAAR) 25 MG tablet Take 1 tablet (25 mg total) by mouth daily. 90 tablet 3     metFORMIN (GLUCOPHAGE) 1000 MG tablet Take 1 tablet (1,000 mg total) by mouth 2 (two) times a day. 180 tablet 3     metoprolol succinate (TOPROL-XL) 50 MG 24 hr tablet Take 0.5 tablets (25 mg total) by mouth daily.       rosuvastatin (CRESTOR) 10 MG tablet TAKE ONE TABLET BY MOUTH AT BEDTIME  90 tablet 3     nitroglycerin (NITROSTAT) 0.4 MG SL tablet Place 0.4 mg under the tongue every 5 (five) minutes as needed for chest pain.       No current facility-administered medications for this visit.      No Known Allergies  Social History     Tobacco Use     Smoking status: Former Smoker     Packs/day: 25.00     Years: 1.00     Pack years: 25.00     Last attempt to quit: 2006     Years since quittin.4     Smokeless tobacco: Never Used   Substance Use Topics     Alcohol use: Yes     Alcohol/week: 1.2 oz     Types: 2 Cans of beer per week     Comment: Not in the last month or so     Drug use: No

## 2021-05-30 NOTE — PATIENT INSTRUCTIONS - HE
EKG appears stable today.    Lab work to better understand symptoms.  I will send a lab letter with results; if there is something more urgent, we will call.    Set up an appointment with me in 1 week for recheck.  If you are feeling better, you can cancel that appointment.    If your symptoms get worse-if you develop worsening chest pain, shortness of breath, etc., seek immediate medical evaluation.

## 2021-05-30 NOTE — PROGRESS NOTES
Clinic Note    Assessment:     Assessment and Plan:  1. Chest pressure  EKG appears benign today.  Patient is quite active and performs various heavy lifting with physical activity daily; he does not rule out the possibility of some sort of musculoskeletal strain in his chest.  Vital signs are completely normal today.  Nontoxic physical exam.  We will proceed with lab work listed below and have him follow-up with me in 1 week for recheck, sooner if needed.  See patient instructions below for plan of care.    - Electrocardiogram Perform and Read  - Troponin I  - Comprehensive Metabolic Panel  - HM2(CBC w/o Differential)       Patient Instructions   EKG appears stable today.    Lab work to better understand symptoms.  I will send a lab letter with results; if there is something more urgent, we will call.    Set up an appointment with me in 1 week for recheck.  If you are feeling better, you can cancel that appointment.    If your symptoms get worse-if you develop worsening chest pain, shortness of breath, etc., seek immediate medical evaluation.    Return in about 1 week (around 7/31/2019).         Subjective:      Patient comes the clinic today for multiple complaints.    He is been feeling more fatigued lately, specifically over the last 3 days.    Last week, [place] was up at his cabin and says that he would wake up in the morning and try and read the newspaper only to fall asleep shortly after sitting down to read.  This is a new problem for him.  He does not have a history of sleep apnea.  Had a sleep study 10 years ago which was negative.    He has been dealing with some chest pressure in the middle of his sternum intermittently over the last 3 days.  It is not persistent throughout the day and seems to happen randomly.  3 out of 10 in intensity.  He does not characterize it as a sharp pain.  No pleurisy.  No new cough.  No orthopnea.  No palpitations.  No swelling in the lower extremities.    May 2018 heart echo  "showed 4.1 cm aortic root dilatation that was stable.  Ejection fraction 52%.  Normal mitral valve.  No aortic insufficiency.    2018 chest echo negative for ischemia.    He does have a history of brief SVT which gave him symptomatic palpitations in the past.  He had sick sinus syndrome in  and had a pacemaker placed.  He had pacemaker evaluation in May with one mode switch lasting less than 1 minute, burden less than 1%.    He does not feel ill or infectious.  He has not had any fevers.  No chills.  No sore throat.  No sinus pain/pressure.  He does have a little bit more phlegm/drainage down the back of his throat.    History of type 2 diabetes mellitus.  Says blood sugars have been a bit elevated recently, 140s and 150s.  He uses metformin 1000 mg twice daily.    He does not have any follow-up with PCP, Dr. Neil Lomeli due to an insurance issue.  [facility] [employer] system is apparently out of network.    The following portions of the patient's history were reviewed and updated as appropriate: Allergies, medications, problems, prior note.    Review of Systems:    Review is otherwise negative except for what is mentioned above.     Social Hx:    Social History     Tobacco Use   Smoking Status Former Smoker     Packs/day: 25.00     Years: 1.00     Pack years: 25.00     Last attempt to quit: 2006     Years since quittin.5   Smokeless Tobacco Never Used         Objective:     Vitals:    19 1526   BP: 114/72   Patient Site: Right Arm   Patient Position: Sitting   Cuff Size: Adult Regular   Pulse: 72   Temp: 98.5  F (36.9  C)   TempSrc: Oral   SpO2: 97%   Weight: 220 lb 11.2 oz (100.1 kg)   Height: 5' 9\" (1.753 m)       Exam:    General: No apparent distress. Calm. Alert and Oriented X3. Pt behavior is appropriate.  Head:Atraumatic. Normocephalic, non-tender to palpation  Neck: Supple. No JVD. Full ROM. No adenopathy  Eyes: PERRL, No discharge. No strabismus. No nystagmus.  Ears: TMs pearly gray with " landmarks visible.   Nose/Mouth/Throat: Patent nares, no oral lesions, pharynx clear and without exudate. Uvula mid-line. Nasal septum mid-line. Clear turbinates.   Lymph: No axillar or cervical adenopathy.   Chest/Lungs: Normal chest wall, clear to auscultation, normal respiratory effort and rate.   Heart/Pulses: Regular rate and rhythm, strong and equal radial pulses, no murmurs. Capillary refill <2 seconds. No edema.   Abdomen: Soft, no palpable masses. No hepatosplenomegaly, no tenderness with palpation noted. Bowel sounds active in all quadrants. No increased tympany.   Genitalia: Not examined.   Musculoskeletal: No CVA tenderness with palpation. Good ROM with extremities.   Neurologic: Interactive, alert, no focal findings, CNs intact.   Skin: Warm, dry. Normal hair pattern. Free of lesions. Normal skin turgor.       Patient Active Problem List   Diagnosis     Nontoxic Autonomous Thyroid Nodule     DM (diabetes mellitus), type 2 (H)     Pure hypercholesterolemia     Benign prostatic hyperplasia with urinary obstruction     Nephrolithiasis     Cardiac pacemaker in situ     Essential hypertension     Adenomatous colon polyp (01/2015)     History of colonic polyps     History of basal cell cancer     Current Outpatient Medications   Medication Sig Dispense Refill     ACCU-CHEK BILLY PLUS TEST STRP strips TEST TWICE DAILY 200 strip 2     aspirin 81 MG EC tablet Take 81 mg by mouth daily.       generic lancets Check sugars once daily 2 each 6     losartan (COZAAR) 25 MG tablet Take 1 tablet (25 mg total) by mouth daily. 90 tablet 3     metFORMIN (GLUCOPHAGE) 1000 MG tablet Take 1 tablet (1,000 mg total) by mouth 2 (two) times a day. 180 tablet 3     metoprolol succinate (TOPROL-XL) 50 MG 24 hr tablet Take 0.5 tablets (25 mg total) by mouth daily.       rosuvastatin (CRESTOR) 10 MG tablet TAKE ONE TABLET BY MOUTH AT BEDTIME  90 tablet 3     nitroglycerin (NITROSTAT) 0.4 MG SL tablet Place 0.4 mg under the tongue  every 5 (five) minutes as needed for chest pain.       No current facility-administered medications for this visit.            Bandar Hoffmann CNP (Rob)    7/24/2019

## 2021-05-30 NOTE — PATIENT INSTRUCTIONS - HE
No changes in medication.    You will be due for a physical exam at the end of the year.  Find a new physician, according to your insurance.    You will be due for a repeat heart echo later this year or early next year to follow-up on your aortic root dilatation.    Results of today's lab work will be mailed to you.

## 2021-05-31 ENCOUNTER — RECORDS - HEALTHEAST (OUTPATIENT)
Dept: ADMINISTRATIVE | Facility: CLINIC | Age: 71
End: 2021-05-31

## 2021-05-31 VITALS — HEIGHT: 71 IN | BODY MASS INDEX: 32.06 KG/M2 | WEIGHT: 229 LBS

## 2021-05-31 VITALS — BODY MASS INDEX: 33.33 KG/M2 | WEIGHT: 229 LBS

## 2021-05-31 VITALS — BODY MASS INDEX: 33.59 KG/M2 | WEIGHT: 226.8 LBS | HEIGHT: 69 IN

## 2021-06-01 VITALS — HEIGHT: 69 IN | WEIGHT: 217 LBS | BODY MASS INDEX: 32.14 KG/M2

## 2021-06-01 VITALS — BODY MASS INDEX: 32.58 KG/M2 | WEIGHT: 220 LBS | HEIGHT: 69 IN

## 2021-06-01 VITALS — BODY MASS INDEX: 31.92 KG/M2 | WEIGHT: 217.7 LBS

## 2021-06-01 VITALS — WEIGHT: 214 LBS | HEIGHT: 69 IN | BODY MASS INDEX: 31.7 KG/M2

## 2021-06-01 VITALS — HEIGHT: 69 IN | BODY MASS INDEX: 32.14 KG/M2 | WEIGHT: 217 LBS

## 2021-06-01 VITALS — WEIGHT: 214 LBS | BODY MASS INDEX: 31.37 KG/M2

## 2021-06-01 NOTE — PROGRESS NOTES
Clinic Note    Assessment:     Assessment and Plan:  1. Elevated serum creatinine  Creatinine was 1.7 when checked 2 months ago.  He is cut down on his caffeine intake and has been drinking more water.  If his creatinine continues to be elevated, we could consider reducing his losartan/metformin.  - Basic Metabolic Panel    2. Screen for colon cancer  - Ambulatory referral for Colonoscopy       Patient Instructions   Recheck kidney labs and electrolytes today.    I will call you next week and let you know about results and what we need to do next.        Return in about 6 months (around 3/27/2020).         Subjective:      Patient comes to clinic today for follow-up.    I initially saw him on 2019.  At that time, he had been having some chest pressure, thought to be musculoskeletal in nature.    We cassandra a CMP at that time which showed creatinine of 1.75, GFR of 39.  His typical range appeared to be in the 1.3-1.5 range.    Patient does have a history of hypertension and uses losartan 25 mg, as well as metoprolol succinate 50 mg.  On a baby aspirin but denies any ibuprofen or naproxen use.    Had been drinking lots of coffee, more than 10 cups/day.  He has cut that down now, drinking 1 cup/day and trying to increase his water intake.    His chest discomfort is completely resolved.    The following portions of the patient's history were reviewed and updated as appropriate: Allergies, medications, problems, prior note.    Review of Systems:    Review is otherwise negative except for what is mentioned above.     Social Hx:    Social History     Tobacco Use   Smoking Status Former Smoker     Packs/day: 25.00     Years: 1.00     Pack years: 25.00     Last attempt to quit: 2006     Years since quittin.7   Smokeless Tobacco Never Used         Objective:     Vitals:    19 0858   BP: 122/60   Pulse: 64   Weight: 215 lb (97.5 kg)       Exam:    General: No apparent distress. Calm. Alert and Oriented X3. Pt  behavior is appropriate.      Patient Active Problem List   Diagnosis     Nontoxic Autonomous Thyroid Nodule     DM (diabetes mellitus), type 2 (H)     Pure hypercholesterolemia     Benign prostatic hyperplasia with urinary obstruction     Nephrolithiasis     Cardiac pacemaker in situ     Essential hypertension     Adenomatous colon polyp (01/2015)     History of colonic polyps     History of basal cell cancer     Current Outpatient Medications   Medication Sig Dispense Refill     ACCU-CHEK BILLY PLUS TEST STRP strips TEST TWICE DAILY 200 strip 2     aspirin 81 MG EC tablet Take 81 mg by mouth daily.       generic lancets Check sugars once daily 2 each 6     losartan (COZAAR) 25 MG tablet Take 1 tablet (25 mg total) by mouth daily. 90 tablet 3     metFORMIN (GLUCOPHAGE) 1000 MG tablet Take 1 tablet (1,000 mg total) by mouth 2 (two) times a day. 180 tablet 3     metoprolol succinate (TOPROL-XL) 50 MG 24 hr tablet Take 0.5 tablets (25 mg total) by mouth daily.       nitroglycerin (NITROSTAT) 0.4 MG SL tablet Place 0.4 mg under the tongue every 5 (five) minutes as needed for chest pain.       rosuvastatin (CRESTOR) 10 MG tablet TAKE ONE TABLET BY MOUTH AT BEDTIME  90 tablet 3     No current facility-administered medications for this visit.            Bandar Hoffmann (Rob), [place]    9/27/2019

## 2021-06-01 NOTE — PATIENT INSTRUCTIONS - HE
Recheck kidney labs and electrolytes today.    I will call you next week and let you know about results and what we need to do next.

## 2021-06-02 VITALS — BODY MASS INDEX: 31.64 KG/M2 | HEIGHT: 69 IN | WEIGHT: 213.6 LBS

## 2021-06-02 VITALS — BODY MASS INDEX: 32.2 KG/M2 | WEIGHT: 219.6 LBS

## 2021-06-02 VITALS — HEIGHT: 69 IN | WEIGHT: 221 LBS | BODY MASS INDEX: 32.73 KG/M2

## 2021-06-02 VITALS — BODY MASS INDEX: 32 KG/M2 | WEIGHT: 218.3 LBS

## 2021-06-02 VITALS — HEIGHT: 69 IN | WEIGHT: 219 LBS | BODY MASS INDEX: 32.44 KG/M2

## 2021-06-02 VITALS — BODY MASS INDEX: 32.11 KG/M2 | WEIGHT: 219 LBS

## 2021-06-02 NOTE — TELEPHONE ENCOUNTER
Pt unable to access results for last lab on [employer], requests call with results/expected results 1 week ago

## 2021-06-03 VITALS — WEIGHT: 220.7 LBS | BODY MASS INDEX: 32.69 KG/M2 | HEIGHT: 69 IN

## 2021-06-03 VITALS — WEIGHT: 221 LBS | BODY MASS INDEX: 32.4 KG/M2

## 2021-06-03 VITALS
SYSTOLIC BLOOD PRESSURE: 122 MMHG | WEIGHT: 215 LBS | HEART RATE: 64 BPM | BODY MASS INDEX: 31.75 KG/M2 | DIASTOLIC BLOOD PRESSURE: 60 MMHG

## 2021-06-04 VITALS
WEIGHT: 217 LBS | DIASTOLIC BLOOD PRESSURE: 84 MMHG | HEART RATE: 71 BPM | OXYGEN SATURATION: 97 % | BODY MASS INDEX: 32.05 KG/M2 | TEMPERATURE: 97.7 F | SYSTOLIC BLOOD PRESSURE: 153 MMHG | RESPIRATION RATE: 17 BRPM

## 2021-06-04 VITALS
DIASTOLIC BLOOD PRESSURE: 64 MMHG | WEIGHT: 218 LBS | SYSTOLIC BLOOD PRESSURE: 126 MMHG | RESPIRATION RATE: 16 BRPM | HEART RATE: 68 BPM | BODY MASS INDEX: 32.29 KG/M2 | HEIGHT: 69 IN

## 2021-06-04 VITALS — BODY MASS INDEX: 32.29 KG/M2 | WEIGHT: 218 LBS | HEIGHT: 69 IN

## 2021-06-04 VITALS — BODY MASS INDEX: 32.05 KG/M2 | WEIGHT: 217 LBS | DIASTOLIC BLOOD PRESSURE: 81 MMHG | SYSTOLIC BLOOD PRESSURE: 150 MMHG

## 2021-06-04 NOTE — PROGRESS NOTES
Clinic Note    Assessment:     Assessment and Plan:  1. Visit for wound check  His wounds appear to be healing nicely; no signs of infection.  See patient instructions below for plan of care.       Patient Instructions   Your wounds look okay today.  No signs of infection.    Finish oral antibiotics as prescribed.    Use topical antibiotic ointment for the next 2 to 3 days.  After that, you can transition to using Vaseline with dressing changes.    Continue changing dressings every other day for the next week.  After that, you can start leaving wound open to air, and transitioning over to Band-Aid.    Continue using your walking boot for the next 2 weeks.  If you have significant toe pain with normal shoes after that, wear the boot for a total of 6 weeks.    If your toe continues to hurt after 6 weeks of boot use, we will place referral to orthopedics.    No follow-ups on file.         Subjective:      Patient comes to clinic today for follow-up.    He was seen at the [facility] on [facility]/18 after dropping a heavy object on his right foot.  He sustained injuries to his right second and third toes.    He had trauma laceration which did not require sutures.  Placed on Keflex antibiotic prophylactically.    He had x-rays of his toe which suggested a nondisplaced fracture through the distal tuft of second distal phalanx.    He was placed in Aircast boot and told to follow-up with PCP for recheck.    He has been doing dressing changes every other day.  Using topical antibiotic ointment.  Has 1 more day of Keflex antibiotic to use.    Denies any fevers.  Pain well controlled.    The following portions of the patient's history were reviewed and updated as appropriate: Allergies, medications, problem list, prior note.     Review of Systems:    Review is otherwise negative except for what is mentioned above.     Social Hx:    Social History     Tobacco Use   Smoking Status Former Smoker     Packs/day: 25.00     Years: 1.00     Pack  years: 25.00     Last attempt to quit: 2006     Years since quittin.9   Smokeless Tobacco Never Used         Objective:     Vitals:    19 1101   BP: 129/83   Pulse: 80       Exam:    General: No apparent distress. Calm. Alert and Oriented X3. Pt behavior is appropriate.  Skin: Right foot- second and third toes with abrasion/laceration type wound to the dorsal aspect, proximal to the toenail.  Some dried blood in scabbing present.  Wound bed has evidence of granulation tissue.  There is no mucopurulent drainage or surrounding fluctuance/streaking.      Patient Active Problem List   Diagnosis     Nontoxic Autonomous Thyroid Nodule     DM (diabetes mellitus), type 2 (H)     Pure hypercholesterolemia     Benign prostatic hyperplasia with urinary obstruction     Nephrolithiasis     Cardiac pacemaker in situ     Essential hypertension     Adenomatous colon polyp (2015)     History of colonic polyps     History of basal cell cancer     Current Outpatient Medications   Medication Sig Dispense Refill     ACCU-CHEK BILLY PLUS TEST STRP strips TEST TWICE DAILY 200 strip 2     aspirin 81 MG EC tablet Take 81 mg by mouth daily.       cephalexin (KEFLEX) 500 MG capsule Take 1 capsule (500 mg total) by mouth 4 (four) times a day for 7 days. 28 capsule 0     generic lancets Check sugars once daily 2 each 6     losartan (COZAAR) 25 MG tablet Take 1 tablet (25 mg total) by mouth daily. 90 tablet 3     metFORMIN (GLUCOPHAGE) 1000 MG tablet Take 1 tablet (1,000 mg total) by mouth 2 (two) times a day. 180 tablet 3     metoprolol succinate (TOPROL-XL) 50 MG 24 hr tablet Take 0.5 tablets (25 mg total) by mouth daily.       nitroglycerin (NITROSTAT) 0.4 MG SL tablet Place 0.4 mg under the tongue every 5 (five) minutes as needed for chest pain.       rosuvastatin (CRESTOR) 10 MG tablet Take 1 tablet (10 mg total) by mouth at bedtime. 90 tablet 3     No current facility-administered medications for this visit.           Bandar Hoffmann (Rob), [place]    12/24/2019

## 2021-06-04 NOTE — PROGRESS NOTES
Impression:  Open fractures of the right second and third toes    Plan:  Firm soled shoe, keep the wounds clean and dry, tetanus status is up-to-date, will treat with Keflex for 7 days to try to prevent any wound infection      Chief Complaint:  Chief Complaint   Patient presents with     Right Toe Injury / bleeding     today         HPI:   Dima Gilliland is a 69 y.o. male who presents to this clinic for the evaluation of injuries to the right second and third toes.  Patient dropped a heavy object on his foot about an hour ago.  He complains of pain and bleeding in the second and third right toe.  No numbness or weakness.  No other injuries.  Last tetanus was in October 2016.      PMH:   Past Medical History:   Diagnosis Date     Diabetes mellitus, type II (H)      High cholesterol      History of chest pain 1/2013    per pt thought to be over-exertion while shoveling snow     Hypertension      Sinus bradycardia seen on cardiac monitor 3/26/15    asystole 15 sce intervals     SSS (sick sinus syndrome) (H) 10/15/2015     Syncope     Echo nl, GXT neg, MRI head neg, US carotid neg     Past Surgical History:   Procedure Laterality Date     CARDIAC PACEMAKER PLACEMENT  3/26/15    [facility]     Cystoscopy Laser Cystolitholapaxy  08/26/2014     CYSTOSCOPY PROSTATE W/ LASER N/A 9/9/2014    Procedure: CYSTOSCOPY TRANSURETHRAL RESECTION PROSTATE;  Surgeon: Rocky Goyal MD;  Location: [facility] [place];  Service:      CYSTOSCOPY W/ LITHOLAPAXY / EHL N/A 8/26/2014    Procedure: HOLMIUN LASER CYSTOLITHOLAPAXY;  Surgeon: Rocky Goyal MD;  Location: [facility] [place];  Service:      GANGLION CYST EXCISION       INSERT / REPLACE / REMOVE PACEMAKER  2015     LAPAROSCOPIC CHOLECYSTECTOMY N/A 7/23/2014    Procedure: CHOLECYSTECTOMY LAPAROSCOPIC;  Surgeon: Brady Estevez MD;  Location: [facility];  Service:          ROS:  All other systems negative    Meds:    Current Outpatient Medications:      aspirin  81 MG EC tablet, Take 81 mg by mouth daily., Disp: , Rfl:      losartan (COZAAR) 25 MG tablet, Take 1 tablet (25 mg total) by mouth daily., Disp: 90 tablet, Rfl: 3     metFORMIN (GLUCOPHAGE) 1000 MG tablet, Take 1 tablet (1,000 mg total) by mouth 2 (two) times a day., Disp: 180 tablet, Rfl: 3     metoprolol succinate (TOPROL-XL) 50 MG 24 hr tablet, Take 0.5 tablets (25 mg total) by mouth daily., Disp: , Rfl:      rosuvastatin (CRESTOR) 10 MG tablet, Take 1 tablet (10 mg total) by mouth at bedtime., Disp: 90 tablet, Rfl: 3     ACCU-CHEK BILLY PLUS TEST STRP strips, TEST TWICE DAILY, Disp: 200 strip, Rfl: 2     generic lancets, Check sugars once daily, Disp: 2 each, Rfl: 6     nitroglycerin (NITROSTAT) 0.4 MG SL tablet, Place 0.4 mg under the tongue every 5 (five) minutes as needed for chest pain., Disp: , Rfl:         Social:  Social History     Socioeconomic History     Marital status:      Spouse name: Not on file     Number of children: Not on file     Years of education: Not on file     Highest education level: Not on file   Occupational History     Not on file   Social Needs     Financial resource strain: Not on file     Food insecurity:     Worry: Not on file     Inability: Not on file     Transportation needs:     Medical: Not on file     Non-medical: Not on file   Tobacco Use     Smoking status: Former Smoker     Packs/day: 25.00     Years: 1.00     Pack years: 25.00     Last attempt to quit: 2006     Years since quittin.9     Smokeless tobacco: Never Used   Substance and Sexual Activity     Alcohol use: Yes     Alcohol/week: 2.0 standard drinks     Types: 2 Cans of beer per week     Comment: Not in the last month or so     Drug use: No     Sexual activity: Yes     Partners: Female   Lifestyle     Physical activity:     Days per week: Not on file     Minutes per session: Not on file     Stress: Not on file   Relationships     Social connections:     Talks on phone: Not on file     Gets  together: Not on file     Attends Baptism service: Not on file     Active member of club or organization: Not on file     Attends meetings of clubs or organizations: Not on file     Relationship status: Not on file     Intimate partner violence:     Fear of current or ex partner: Not on file     Emotionally abused: Not on file     Physically abused: Not on file     Forced sexual activity: Not on file   Other Topics Concern     Not on file   Social History Narrative     Not on file         Physical Exam:  Vital signs reviewed  Right foot shows skin tears at the base of the toenails on the second and third toes and mobility of the nail.  No distinct lacerations.  Distal sensation is intact, capillary refill is 1 second      Results:    No results found for this or any previous visit (from the past 24 hour(s)).    Xr Toe Right 2 Or More Vws    Result Date: 12/18/2019  EXAM: XR TOE RIGHT 2 OR MORE VWS LOCATION: [facility] DATE/TIME: 12/18/2019 3:55 PM INDICATION: Unspecified injury of right foot, initial encounter COMPARISON: None.     All 1 one view there is a suggestion of a nondisplaced fracture through the distal tuft of second distal phalanx, though this is not definitely confirmed on the other views. No other acute abnormalities. Moderate grade degenerative changes involving first MTP joint.         Miky Martinez MD

## 2021-06-04 NOTE — TELEPHONE ENCOUNTER
Refill Approved    Rx renewed per Medication Renewal Policy. Medication was last renewed on 11/16/18.    Katya Wallace, [facility] Triage/Med Refill 12/5/2019     Requested Prescriptions   Pending Prescriptions Disp Refills     rosuvastatin (CRESTOR) 10 MG tablet 90 tablet 3     Sig: Take 1 tablet (10 mg total) by mouth at bedtime.       Statins Refill Protocol (Hmg CoA Reductase Inhibitors) Passed - 12/5/2019  4:05 AM        Passed - PCP or prescribing provider visit in past 12 months      Last office visit with prescriber/PCP: 6/27/2019 Neil Lomeli MD OR same dept: 9/27/2019 Bandar Hoffmann CNP OR same specialty: 9/27/2019 Bandar Hoffmann CNP  Last physical: 10/26/2018 Last MTM visit: Visit date not found   Next visit within 3 mo: Visit date not found  Next physical within 3 mo: Visit date not found  Prescriber OR PCP: Neil Lomeli MD  Last diagnosis associated with med order: 1. Pure hypercholesterolemia  - rosuvastatin (CRESTOR) 10 MG tablet; Take 1 tablet (10 mg total) by mouth at bedtime.  Dispense: 90 tablet; Refill: 3    If protocol passes may refill for 12 months if within 3 months of last provider visit (or a total of 15 months).

## 2021-06-04 NOTE — PATIENT INSTRUCTIONS - HE
Your wounds look okay today.  No signs of infection.    Finish oral antibiotics as prescribed.    Use topical antibiotic ointment for the next 2 to 3 days.  After that, you can transition to using Vaseline with dressing changes.    Continue changing dressings every other day for the next week.  After that, you can start leaving wound open to air, and transitioning over to Band-Aid.    Continue using your walking boot for the next 2 weeks.  If you have significant toe pain with normal shoes after that, wear the boot for a total of 6 weeks.    If your toe continues to hurt after 6 weeks of boot use, we will place referral to orthopedics.

## 2021-06-05 ENCOUNTER — RECORDS - HEALTHEAST (OUTPATIENT)
Dept: INTERNAL MEDICINE | Facility: CLINIC | Age: 71
End: 2021-06-05

## 2021-06-05 VITALS
DIASTOLIC BLOOD PRESSURE: 80 MMHG | HEART RATE: 68 BPM | WEIGHT: 212 LBS | BODY MASS INDEX: 31.4 KG/M2 | SYSTOLIC BLOOD PRESSURE: 126 MMHG | RESPIRATION RATE: 14 BRPM | HEIGHT: 69 IN

## 2021-06-05 DIAGNOSIS — N20.0 CALCULUS OF KIDNEY: ICD-10-CM

## 2021-06-06 NOTE — PROGRESS NOTES
In clinic device check with Dr. Marrero.  Please see link for full device report.  Patient was informed of results and next follow up during today's visit.

## 2021-06-07 NOTE — PATIENT INSTRUCTIONS - HE
It was nice to visit with you by video today.  We visited because of the recent visit to the emergency room secondary to chest discomfort.  I reviewed the notes and the EKG and it did not appear that you had any acute heart related issues.  I also reviewed your primary care physician's note.  It sounds as if the symptom has resolved with the Carafate and plans to start omeprazole.  Given some risk factors we decided to pursue stress testing in the form of an exercise stress echocardiogram and I will arrange as an elective test unless you start to develop further symptoms and then please let me know.  In addition we talked about your blood pressure being mildly elevated.  Please forward to me 6-8 blood pressure readings over the next few weeks.  Please check your blood pressure after you have been seated for at least 10 minutes.  Please vary the time of day that you check your blood pressure.  My nurse is Estee and her number is [phone] or write to me on my chart which is my preference.

## 2021-06-08 NOTE — PROGRESS NOTES
[facility] Progress Note    Assessment:  1.  Sick sinus syndrome with pacemaker and doing very well.  He has recently made a conscious decision to lose weight and is feeling well.  Negative stress echocardiogram in May 2015.  He reports mild lightheadedness periodically upon standing.    2.  Hyperlipidemia.  Lipid results from August 2016 I reviewed for his LDL cholesterol was 48, triglycerides 182 on low-dose rosuvastatin.    3.  Hypertension.  Blood pressure appears nicely controlled and he is asked to monitor his blood pressure periodically.        Plan: Follow-up in 6 months    1. Essential hypertension with goal blood pressure less than 130/80     2. Pacemaker     3. Pure hypercholesterolemia           An After Visit Summary was printed and given to the patient.    Subjective:    Dima Gilliland is a 66 y.o. male who returned for a planned  follow up visit.  He reports that he's feeling well.  He has no specific complaints of chest pain, dizziness, or shortness of breath.  The periodic feeling of fatigue has been improved.  He has lost weight and has done a nice job with respect to modifying risk factors.  He has seen Dr. Wilkes recently.  I have reviewed the chart notes.  Pacemaker check today demonstrates in stable pacemaker function without significant rhythm disturbance.  He reports mild periodic lightheadedness upon standing    Review of Systems:   General: WNL  Eyes: WNL  Ears/Nose/Throat: WNL  Lungs: WNL  Heart: WNL  Stomach: WNL  Bladder: WNL  Muscle/Joints: WNL  Skin: WNL  Nervous System: WNL  Mental Health: WNL     Blood: WNL      Problem List:    Patient Active Problem List   Diagnosis     Hematuria     Nontoxic Autonomous Thyroid Nodule     DM (diabetes mellitus), type 2     Pure hypercholesterolemia     BPH (benign prostatic hypertrophy) with urinary obstruction     Nephrolithiasis     Pacemaker     Essential hypertension with goal blood pressure less than 130/80      "Adenomatous colon polyp (01/2015)       Social History     Social History     Marital status:      Spouse name: N/A     Number of children: N/A     Years of education: N/A     Occupational History     Not on file.     Social History Main Topics     Smoking status: Former Smoker     Packs/day: 25.00     Years: 1.00     Quit date: 1/1/2006     Smokeless tobacco: Never Used     Alcohol use 1.2 oz/week     2 Cans of beer per week      Comment: Not in the last month or so     Drug use: No     Sexual activity: Not on file     Other Topics Concern     Not on file     Social History Narrative       Family History   Problem Relation Age of Onset     Cancer Father      COPD Father      COPD Mother      Heart failure Mother        Current Outpatient Prescriptions   Medication Sig Dispense Refill     aspirin 81 MG EC tablet Take 81 mg by mouth daily.       blood glucose test (ACCU-CHEK BILLY PLUS TEST STRP) strips Use 1 each As Directed 2 (two) times a day. Dispense brand per patient's insurance at pharmacy discretion.(E11.9) 200 strip 3     losartan (COZAAR) 50 MG tablet Take 1 tablet (50 mg total) by mouth daily. 30 tablet 2     metFORMIN (GLUCOPHAGE) 1000 MG tablet Take 0.5 tablets (500 mg total) by mouth 2 (two) times a day. (Patient taking differently: Take 1,000 mg by mouth 2 (two) times a day. ) 90 tablet 0     nitroglycerin (NITROSTAT) 0.4 MG SL tablet Place 0.4 mg under the tongue every 5 (five) minutes as needed for chest pain.       rosuvastatin (CRESTOR) 10 MG tablet Take 1 tablet (10 mg total) by mouth bedtime. 30 tablet 2     No current facility-administered medications for this visit.        Objective:     Visit Vitals     /78 (Patient Site: Left Arm, Patient Position: Sitting, Cuff Size: Adult Large)     Pulse 68     Resp 16     Ht 5' 9.5\" (1.765 m)     Wt (!) 231 lb (104.8 kg)     BMI 33.62 kg/m2     (!) 231 lb (104.8 kg)       Physical Exam:    GENERAL APPEARANCE: alert, no apparent " distress  HEENT: no scleral icterus or xanthelasma  NECK: jugular venous pressure within normal limits  CHEST: symmetric, the lungs are clear to auscultation, pacemaker site appears well-healed  CARDIOVASCULAR: regular rhythm without murmur, click, or gallop; no carotid bruits  Abdomen: No Organomegaly, masses, bruits, or tenderness. Bowels sounds are present      EXTREMITIES: no cyanosis, clubbing or edema    Cardiac Testing:  Procedure Date  Date: 05/28/2015 Start: 02:40 PM     Study Location: [facility]  Technical Quality: Adequate visualization     Patient Status: Routine     Height: 70 inches Weight: 237 pounds BSA: 2.24 m^2 BMI: 34.01 kg/m^2     HR: 66 bpm BP: 148/92 mmHg     Indications  Chest pain.     Conclusions      Summary  Good exercise tolerance.  Normal Stress Echo.  Hypertensive blood pressure response to exercise  Mild concentric LVH seen on echo    Lab Results:    Lab Results   Component Value Date     10/11/2016    K 4.5 10/11/2016     10/11/2016    CO2 22 10/11/2016    BUN 19 10/11/2016    CREATININE 1.30 10/11/2016    CALCIUM 9.5 10/11/2016     Lab Results   Component Value Date    CHOL 126 08/31/2016    TRIG 182 (H) 08/31/2016    HDL 42 08/31/2016    LDLDIRECT 52 01/21/2014     No results found for: BNP  CREATININE (mg/dL)   Date Value   10/11/2016 1.30   08/31/2016 1.35 (H)   03/27/2015 1.07   03/26/2015 1.16     LDL CALCULATED (mg/dL)   Date Value   08/31/2016 48   05/28/2015 58   07/09/2013 47     Lab Results   Component Value Date    WBC 7.7 08/31/2016    WBC 5.9 03/26/2015    HGB 14.4 08/31/2016    HCT 42.9 08/31/2016    MCV 85 08/31/2016     08/31/2016               This note has been dictated using voice recognition software. Any grammatical or context distortions are unintentional and inherent to the software.      Brady Marrero M.D., F.A.C.C.  [facility]  [phone]

## 2021-06-09 NOTE — PROGRESS NOTES
ASSESSMENT/PLAN:  1. DM (diabetes mellitus), type 2  Doing really well still on current medicines but he had bumped up to thousand milligrams twice a day and metformin.  She is because his sugars were a little higher couple months ago around Sally time.  No low sugars.  He'll stick with what he is doing thousand grams twice per day because his A1c was up to 6.3.  No change besides encouraging him to lose weight.  - Glycosylated Hemoglobin A1c  - JIC RED  - metFORMIN (GLUCOPHAGE) 1000 MG tablet; Take 1 tablet (1,000 mg total) by mouth 2 (two) times a day.  Dispense: 180 tablet; Refill: 3    2. Essential hypertension with goal blood pressure less than 130/80  Well-controlled on his current lower dose of losartan.  He decreased himself to 25 mg daily.  He was getting lightheaded on the higher dose of 50 mg.  With his pressures by now and with the hope that he is going to be losing weight I think that's fine and will follow up in 6 months.  - Basic Metabolic Panel    3. Renal insufficiency, mild  We'll check renal function again it's been very mild renal insufficiency.  It will be interesting to see what happens on the decreased dose of the losartan.  - Basic Metabolic Panel    There are no Patient Instructions on file for this visit.    Return in about 8 months (around 10/28/2017) for Annual physical and DM check.    CHIEF COMPLAINT:  Chief Complaint   Patient presents with     Diabetes       HISTORY OF PRESENT ILLNESS:  Dima Gilliland is a 66 y.o. male presenting to the clinic today for type II diabetes. He had a hemoglobin A1c of 6.3 today. He is currently taking 1000 mg of metformin twice daily; he increased his metformin to 1000 mg twice daily from 500 mg twice daily. He mentions that he is less active during the winter time but he has managed to maintain his weight. He has been checking his blood sugars at home and mentions that they have been running on the high side.    Hypertension: He states that he  decreased his losartan to 25 mg once daily. He did this because he was working in his shop one day and he became lightheaded. He saw cardiology on 1/23/17 for his sick sinus rhythm. His blood pressure is 126/64 in clinic today.     Renal Insufficiency: His last creatinine level was 1.30 and BUN was 19 as of 10/11/16.     REVIEW OF SYSTEMS:   He denies any chest pain, tightness or pressure in the chest, shortness of breath. He denies any numbness or tingling in his feet. He has not experienced any difficulties with his pacemaker.   Comprehensive review of systems negative except as noted above.    PFSH:  Reviewed as above.     TOBACCO USE:  History   Smoking Status     Former Smoker     Packs/day: 25.00     Years: 1.00     Quit date: 1/1/2006   Smokeless Tobacco     Never Used       VITALS:  Vitals:    02/28/17 0946   BP: 126/64   Pulse: 69   Weight: (!) 228 lb 3.2 oz (103.5 kg)     Wt Readings from Last 3 Encounters:   02/28/17 (!) 228 lb 3.2 oz (103.5 kg)   01/23/17 (!) 231 lb (104.8 kg)   11/07/16 (!) 230 lb (104.3 kg)     Body mass index is 33.22 kg/(m^2).  The following high BMI interventions were performed this visit: encouragement to exercise, weight monitoring and dietary management education, guidance, and counseling    PHYSICAL EXAM:  General Appearance: Alert, cooperative, no distress, appears stated age.  Lungs: Clear to auscultation bilaterally, good air movement.  Heart: Regular rate and rhythm, S1 and S2 normal, no murmur or bruit. Pacemaker in left upper chest.   Extremities: No CCE.  Diabetic foot exam normal.   Psychiatric:  He has a normal mood and affect.     ADDITIONAL HISTORY SUMMARIZED (FROM OLD RECORDS OR HISTORY FROM SOMEONE OTHER THAN THE PATIENT OR ANOTHER HEALTHCARE PROVIDER) (2 TOTAL): Reviewed cardiology note from 1/23/17; sick sinus rhythm.     DECISION TO OBTAIN EXTRA INFORMATION (OLD RECORDS REQUESTED OR HISTORY FROM ANOTHER PERSON OR ACCESSING CARE EVERYWHERE) (1 TOTAL): None.      RADIOLOGY TESTS SUMMARIZED OR ORDERED (XRAY/CT/MRI/DXA) (1 TOTAL): None.    LABS REVIEWED OR ORDERED (1 TOTAL): Ordered A1c and BMP labs today.     MEDICINE TESTS SUMMARIZED OR ORDERED (EKG/ECHO/COLONOSCOPY/EGD) (1 TOTAL): None.    INDEPENDENT REVIEW OF EKG OR X-RAY (2 EACH): None.       The visit lasted a total of 9 minutes face to face with the patient. Over 50% of the time was spent counseling and educating the patient about DM.    IDenisse, am scribing for and in the presence of, Dr. Zamudio.    I, Dr. Zamudio, personally performed the services described in this documentation, as scribed by Denisse Campos in my presence, and it is both accurate and complete.    MEDICATIONS:  Current Outpatient Prescriptions   Medication Sig Dispense Refill     aspirin 81 MG EC tablet Take 81 mg by mouth daily.       blood glucose test (ACCU-CHEK BILLY PLUS TEST STRP) strips Use 1 each As Directed 2 (two) times a day. Dispense brand per patient's insurance at pharmacy discretion.(E11.9) 200 strip 3     losartan (COZAAR) 50 MG tablet Take 1 tablet (50 mg total) by mouth daily. (Patient taking differently: Take 25 mg by mouth daily. ) 30 tablet 2     metFORMIN (GLUCOPHAGE) 1000 MG tablet Take 0.5 tablets (500 mg total) by mouth 2 (two) times a day. (Patient taking differently: Take 1,000 mg by mouth 2 (two) times a day. ) 90 tablet 0     nitroglycerin (NITROSTAT) 0.4 MG SL tablet Place 0.4 mg under the tongue every 5 (five) minutes as needed for chest pain.       rosuvastatin (CRESTOR) 10 MG tablet Take 1 tablet (10 mg total) by mouth bedtime. 30 tablet 2     No current facility-administered medications for this visit.        Total data points: 3

## 2021-06-10 NOTE — TELEPHONE ENCOUNTER
Results and recommendations relayed to pt.  Pt feels well, and does check his blood pressure at home, he will mail in the readings.  rishabh

## 2021-06-11 NOTE — PROGRESS NOTES
Subjective:      Patient ID: Dima Gilliland is a 67 y.o. male.    Chief Complaint:    HPI Dima Gilliland is a 67 y.o. male who presents today complaining of a wood tick bite on his right shin 5 days ago.  The day after it was removed it became very red in the site.  He has been been using a triamcinolone .1% cream, but it seemed to be getting worse so he switched to the triple abx cream. It itches a little bit.  He has not had any sick symptoms such as fever or chills.  The skin has not been hot or tight and there has not been any pus present.  It is not painful to the touch.        Past Medical History:   Diagnosis Date     History of chest pain 1/2013    per pt thought to be over-exertion while shoveling snow     Sinus bradycardia seen on cardiac monitor 3/26/15    asystole 15 sce intervals     SSS (sick sinus syndrome) 10/15/2015     Syncope     Echo nl, GXT neg, MRI head neg, US carotid neg       Past Surgical History:   Procedure Laterality Date     CARDIAC PACEMAKER PLACEMENT  3/26/15    [facility]     Cystoscopy Laser Cystolitholapaxy  08/26/2014     CYSTOSCOPY PROSTATE W/ LASER N/A 9/9/2014    Procedure: CYSTOSCOPY TRANSURETHRAL RESECTION PROSTATE;  Surgeon: Rocky Goyal MD;  [place] [facility] [place]:      CYSTOSCOPY W/ LITHOLAPAXY / EHL N/A 8/26/2014    Procedure: HOLMIUN LASER CYSTOLITHOLAPAXY;  Surgeon: Rocky Goyal MD;  [place] [facility] [place]  Service:      GANGLION CYST EXCISION       LAPAROSCOPIC CHOLECYSTECTOMY N/A 7/23/2014    Procedure: CHOLECYSTECTOMY LAPAROSCOPIC;  Surgeon: Brady Estevez MD;  Location: [facility] OR;  Service:        Family History   Problem Relation Age of Onset     Cancer Father      COPD Father      COPD Mother      Heart failure Mother        Social History   Substance Use Topics     Smoking status: Former Smoker     Packs/day: 25.00     Years: 1.00     Quit date: 1/1/2006     Smokeless tobacco: Never Used     Alcohol use 1.2  oz/week     2 Cans of beer per week      Comment: Not in the last month or so       Review of Systems   Constitutional: Negative for chills and fever.   Skin: Positive for color change. Negative for pallor, rash and wound.        Positive for tick bite with erythema.   All other systems reviewed and are negative.      Objective:     /76  Pulse 65  Temp 98  F (36.7  C) (Oral)   Resp 16  Wt (!) 229 lb (103.9 kg)  SpO2 96%  BMI 33.33 kg/m2    Physical Exam   Constitutional: He appears well-developed and well-nourished. No distress.   HENT:   Head: Normocephalic and atraumatic.   Right Ear: External ear normal.   Left Ear: External ear normal.   Eyes: Conjunctivae are normal.   Pulmonary/Chest: Effort normal and breath sounds normal. No respiratory distress.   Skin: He is not diaphoretic.   Deep erythema is nonblanching in an annular region approximately 2 cm in diameter on the lower right shin.  It is not hot to the touch, there is no exudate or scaling.  It is not tender to palpation.   Psychiatric: He has a normal mood and affect. His behavior is normal. Judgment and thought content normal.   Nursing note and vitals reviewed.    Clinical Decision Making:  Because the patient identified the tick is a wood tick there is no need for line prophylaxis.  The tick bite appears red and irritated but there are no signs of cellulitis infection.  I recommend continuing to monitor as he is doing.  The erythematous region was marked by a skin marker today.  I also recommended he continue using triamcinolone and antibiotic ointment to help prevent infection and symptomatic relief of itching.    Procedures      Assessment / Plan:     1. Tick bite           Patient Instructions   1. Continue using triple antibiotic ointment and triamcinolone as needed for itch relief. If the itching becomes more severe you can try an oral benadryl.   2. Continue to monitor for growing area of redness, hot and tight skin, or any pus. If  these symptoms develop there may be a secondary infection and you should be seen again.

## 2021-06-13 NOTE — PATIENT INSTRUCTIONS - HE
Please call with any heart related questions. I will be in touch with the blood work.My nurse is Estee and her number is [phone]

## 2021-06-13 NOTE — PROGRESS NOTES
"[facility] Progress Note    Assessment:  1.  Sick sinus syndrome with pacemaker check most recently July 2017 demonstrating normal pacemaker function normal rhythm with rare episode of short SVT.  He continues to note episodic fuzzy sensation.  Dr. Wilkes recommended evaluation in the emergency room when the symptom is occurring but he is not yet been able to follow through with this.  I am going to correspond with Dr. Wilkes about whether further neurologic evaluation would be warranted as we are not identifying anything on pacemaker evaluation that would explain the symptoms.    2.  Hypertension.  He has been on losartan with a blood pressure goal of 130/80.  He did decrease the losartan to 25 mg daily.  Blood pressure is mildly elevated here in the office today.  I have asked him to monitor blood pressure readings and he does have follow-up next month with Dr. Wilkes.  He had a negative stress echocardiogram in 2015.    3.  Hyperlipidemia with LDL cholesterol August 2016 of 48.  He is on 10 mg of rosuvastatin.        Plan: As outlined above with follow-up in 6 months and monitoring blood pressure.    1. Pacemaker     2. Essential hypertension with goal blood pressure less than 130/80           An After Visit Summary was printed and given to the patient.    Subjective:    Dima Gilliland is a 67 y.o. male who returned for a planned  follow up visit.  He reports no specific complaints of chest pain or shortness of breath.  He did cut his losartan back to 25 mg daily as outlined in Dr. Wilkes's note from February 2017.  He has not monitored his blood pressure.  He continues to have episodes where he feels \"fuzzy\" and this can last minutes in duration or all day long.  He brings in a list of the episodes that appeared to occur a few times per month.  Pacemaker check does not identify any associated rhythm issue.  He has had rare episodes of short occurrences of SVT.  I suggested that we " did talk with Dr. Wilkes about further neurologic investigation given his persistent history.    Review of Systems:   General: WNL  Eyes: WNL  Ears/Nose/Throat: WNL  Lungs: WNL  Heart: WNL  Stomach: WNL  Bladder: WNL  Muscle/Joints: WNL  Skin: WNL  Nervous System: Dizziness  Mental Health: WNL     Blood: WNL      Problem List:    Patient Active Problem List   Diagnosis     Hematuria     Nontoxic Autonomous Thyroid Nodule     DM (diabetes mellitus), type 2     Pure hypercholesterolemia     BPH (benign prostatic hypertrophy) with urinary obstruction     Nephrolithiasis     Pacemaker     Essential hypertension with goal blood pressure less than 130/80     Adenomatous colon polyp (01/2015)       Social History     Social History     Marital status:      Spouse name: N/A     Number of children: N/A     Years of education: N/A     Occupational History     Not on file.     Social History Main Topics     Smoking status: Former Smoker     Packs/day: 25.00     Years: 1.00     Quit date: 1/1/2006     Smokeless tobacco: Never Used     Alcohol use 1.2 oz/week     2 Cans of beer per week      Comment: Not in the last month or so     Drug use: No     Sexual activity: Not on file     Other Topics Concern     Not on file     Social History Narrative       Family History   Problem Relation Age of Onset     Cancer Father      COPD Father      COPD Mother      Heart failure Mother        Current Outpatient Prescriptions   Medication Sig Dispense Refill     aspirin 81 MG EC tablet Take 81 mg by mouth daily.       blood glucose test (ACCU-CHEK BILLY PLUS TEST STRP) strips Use 1 each As Directed 2 (two) times a day. Dispense brand per patient's insurance at pharmacy discretion.(E11.9) 200 strip 3     losartan (COZAAR) 50 MG tablet Take 0.5 tablets (25 mg total) by mouth daily. 45 tablet 3     metFORMIN (GLUCOPHAGE) 1000 MG tablet Take 1 tablet (1,000 mg total) by mouth 2 (two) times a day. 180 tablet 3     nitroglycerin  "(NITROSTAT) 0.4 MG SL tablet Place 0.4 mg under the tongue every 5 (five) minutes as needed for chest pain.       rosuvastatin (CRESTOR) 10 MG tablet TAKE ONE TABLET BY MOUTH AT BEDTIME  90 tablet 2     No current facility-administered medications for this visit.        Objective:     /74 (Patient Site: Left Arm, Patient Position: Sitting, Cuff Size: Adult Large)  Pulse 60  Resp 16  Ht 5' 10.5\" (1.791 m)  Wt (!) 229 lb (103.9 kg)  BMI 32.39 kg/m2  (!) 229 lb (103.9 kg)   148/74 during my examination    Physical Exam:    GENERAL APPEARANCE: alert, no apparent distress  HEENT: no scleral icterus or xanthelasma  NECK: jugular venous pressure within normal limits  CHEST: symmetric, the lungs are clear to auscultation  CARDIOVASCULAR: regular rhythm without murmur, click, or gallop; no carotid bruits, pacemaker site well-healed  Abdomen: No Organomegaly, masses, bruits, or tenderness. Bowels sounds are present      EXTREMITIES: no cyanosis, clubbing or edema    Cardiac Testing:  Procedure Date  Date: 05/28/2015 Start: 02:40 PM     Study Location: [facility]  Technical Quality: Adequate visualization     Patient Status: Routine     Height: 70 inches Weight: 237 pounds BSA: 2.24 m^2 BMI: 34.01 kg/m^2     HR: 66 bpm BP: 148/92 mmHg     Indications  Chest pain.      Conclusions       Summary   Good exercise tolerance.   Normal Stress Echo.   Hypertensive blood pressure response to exercise   Mild concentric LVH seen on echo          Lab Results:    Lab Results   Component Value Date     02/28/2017    K 4.7 02/28/2017     02/28/2017    CO2 28 02/28/2017    BUN 21 02/28/2017    CREATININE 1.37 (H) 02/28/2017    CALCIUM 10.2 02/28/2017     Lab Results   Component Value Date    CHOL 126 08/31/2016    TRIG 182 (H) 08/31/2016    HDL 42 08/31/2016    LDLDIRECT 52 01/21/2014     No results found for: BNP  Creatinine (mg/dL)   Date Value   02/28/2017 1.37 (H)   10/11/2016 1.30   08/31/2016 1.35 (H)   03/27/2015 " 1.07     LDL Calculated (mg/dL)   Date Value   08/31/2016 48   05/28/2015 58   07/09/2013 47     Lab Results   Component Value Date    WBC 7.7 08/31/2016    WBC 5.9 03/26/2015    HGB 14.4 08/31/2016    HCT 42.9 08/31/2016    MCV 85 08/31/2016     08/31/2016               This note has been dictated using voice recognition software. Any grammatical or context distortions are unintentional and inherent to the software.      Brady Marrero M.D., F.A.C.C.  [facility]  [phone]

## 2021-06-14 NOTE — PROGRESS NOTES
Assessment:     Assessment and Plan:  1. DM (diabetes mellitus), type 2  Doing really well.  Continue same.  A1c was 6.4 today.  Follow-up will be in 4 months mostly but his blood pressure  - Glycosylated Hemoglobin A1c  - JIC RED  - Microalbumin, Random Urine  - Urinalysis    2. Essential hypertension with goal blood pressure less than 130/80  This was elevated couple times and is been elevated at home although his machine is likely high.  He is tolerating the 50 mg of losartan just fine and his blood pressure is excellent.  Will check lab work today and have him return in 4 months.  He was told that if his blood pressures continue in the high 150s at home, to contact us and will increase his losartan further.  - Comprehensive Metabolic Panel  - Urinalysis  - HM2(CBC w/o Differential)    3. Pacemaker  Had sick sinus syndrome and syncope requiring this about 2 years ago.  He is doing well without significant problems or concerns.  He sees cardiology on a regular basis as well.    4. Pure hypercholesterolemia  On a moderate dose of rosuvastatin.   lipids have been good will check a CMP today  - Comprehensive Metabolic Panel    5. Benign prostatic hyperplasia with urinary obstruction  He has had a TURP and is followed by urology on a regular basis.  PSAs are done there.    6. Nephrolithiasis  No recurrence.    7. Hematuria  No persistence and he sees urology.    Healthcare maintenance issues are up-to-date     Plan:     Patient Instructions   Keep up the good work.    Eat right and stay active!          Subjective:     Here for physical exam. Chart reviewed       Past Medical History:   Diagnosis Date     History of chest pain 1/2013    per pt thought to be over-exertion while shoveling snow     Sinus bradycardia seen on cardiac monitor 3/26/15    asystole 15 sce intervals     SSS (sick sinus syndrome) 10/15/2015     Syncope     Echo nl, GXT neg, MRI head neg, US carotid neg     Past Surgical History:   Procedure  "Laterality Date     CARDIAC PACEMAKER PLACEMENT  3/26/15    [facility]     Cystoscopy Laser Cystolitholapaxy  08/26/2014     CYSTOSCOPY PROSTATE W/ LASER N/A 9/9/2014    Procedure: CYSTOSCOPY TRANSURETHRAL RESECTION PROSTATE;  Surgeon: Rocky Goyal MD;  Location: [facility] [place];  Service:      CYSTOSCOPY W/ LITHOLAPAXY / EHL N/A 8/26/2014    Procedure: HOLMIUN LASER CYSTOLITHOLAPAXY;  Surgeon: Rocky Goyal MD;  Location: [facility] [place];  Service:      GANGLION CYST EXCISION       LAPAROSCOPIC CHOLECYSTECTOMY N/A 7/23/2014    Procedure: CHOLECYSTECTOMY LAPAROSCOPIC;  Surgeon: Brady Estevez MD;  Location: [facility];  Service:      Review of patient's allergies indicates no known allergies.  Family History   Problem Relation Age of Onset     Cancer Father      COPD Father      COPD Mother      Heart failure Mother      Social History     Social History     Marital status:      Spouse name: N/A     Number of children: N/A     Years of education: N/A     Occupational History     Not on file.     Social History Main Topics     Smoking status: Former Smoker     Packs/day: 25.00     Years: 1.00     Quit date: 1/1/2006     Smokeless tobacco: Never Used     Alcohol use 1.2 oz/week     2 Cans of beer per week      Comment: Not in the last month or so     Drug use: No     Sexual activity: Yes     Partners: Female     Other Topics Concern     Not on file     Social History Narrative         Review of Systems  Please see form B           Objective:     Vitals:    11/30/17 1253   BP: 128/72   Pulse: 72   Weight: (!) 226 lb 12.8 oz (102.9 kg)   Height: 5' 9.29\" (1.76 m)       Physical  General Appearance: Alert, cooperative, no distress, appears stated age  Head: Normocephalic, without obvious abnormality, atraumatic  Eyes: PERRL, fundi not observed, EOM's intact  Ears: Normal TM's and external ear canals bilateral  Nose: No abnormalities noted  Mouth and Throat: Normal appearance   Neck: " Supple, symmetrical, trachea midline, no adenopathy or thyromegally,  no tenderness/mass/nodules; no carotid bruit or JVD  Back: no CVA tenderness or spinous process pain  Lungs: Clear to auscultation bilaterally, good air movent  Heart: Regular rate and rhythm, S1 and S2 normal, no murmur, rub, or gallop,  Abdomen: Soft, non-tender, no masses, no organomegaly, obese  Rectal exam: Done by urology  Musculoskeletal: No gross abnormalities    Extremities: Extremities normal, atraumatic, no cyanosis or edema, pulses 2/4 and symmetric   Skin:  no  worrisome lesions noted multiple areas that up and excised.  Nothing looks suspicious today.  Lymph nodes: Cervical, supraclavicular, groin, and axillary nodes normal  Neurologic: CN2-12 intact, normal sensation, DTRs 2/4 and symmetric except absent in the ankles.   Psychiatric:  normal mood and affect.      Current Outpatient Prescriptions   Medication Sig Dispense Refill     aspirin 81 MG EC tablet Take 81 mg by mouth daily.       blood glucose test (ACCU-CHEK BILLY PLUS TEST STRP) strips Use 1 each As Directed 2 (two) times a day. Dispense brand per patient's insurance at pharmacy discretion.(E11.9) 200 strip 3     losartan (COZAAR) 50 MG tablet Take 1 tablet (50 mg total) by mouth daily. 90 tablet 3     metFORMIN (GLUCOPHAGE) 1000 MG tablet Take 1 tablet (1,000 mg total) by mouth 2 (two) times a day. 180 tablet 3     nitroglycerin (NITROSTAT) 0.4 MG SL tablet Place 0.4 mg under the tongue every 5 (five) minutes as needed for chest pain.       rosuvastatin (CRESTOR) 10 MG tablet TAKE ONE TABLET BY MOUTH AT BEDTIME  90 tablet 2     No current facility-administered medications for this visit.

## 2021-06-15 NOTE — PROGRESS NOTES
In clinic device check.  Please see link for full device report.  Patient was informed of results and next follow up during today's visit.      Type: In clinic annual pacemaker check.   Presenting Rhythm: ASVS 62bpm  Lead/Battery status: Stable  Arrhythmias: 1 mode switch <1min, burden <1%. 3 high ventricular rate detections, longest episode 1/11/2018 13bt NSVT. No EF since 2015.   Anticoagulant: Aspirin 81mg  Comments: Normal device function. No changes made. Routed to Dr. Marrero per protocol for NSVT to see if he wants to update Echo. BK

## 2021-06-16 PROBLEM — Z86.0100 HISTORY OF COLONIC POLYPS: Status: ACTIVE | Noted: 2018-07-18

## 2021-06-16 PROBLEM — I45.5 SINUS ARREST: Status: ACTIVE | Noted: 2020-02-19

## 2021-06-16 PROBLEM — Z85.828 HISTORY OF BASAL CELL CANCER: Status: ACTIVE | Noted: 2018-10-26

## 2021-06-16 NOTE — PROGRESS NOTES
Normal stress Echo; perhaps 1 PVC  Has had 7 episodes of NSVT  1- had 11 beats lenny about 180 BPM-regular  Almost no ; only 17%   A  P; history of sinus arrest  Syncope felt related to bradycardia  REC  Would start metoprolol succinate 50 mg/day  Routine follow up of Pacemaker  Would not upgrade to ICD

## 2021-06-17 NOTE — PATIENT INSTRUCTIONS - HE
Patient Instructions by Johny Schuster MD at 2/2/2019  1:40 PM     Author: Johny Schuster MD Service: -- Author Type: Physician    Filed: 2/2/2019  2:44 PM Encounter Date: 2/2/2019 Status: Signed    : Johny Schuster MD (Physician)         Patient Education     External Ear Infection (Adult)    External otitis (also called swimmers ear) is an infection in the ear canal. It is often caused by bacteria or fungus. It can occur a few days after water gets trapped in the ear canal (from swimming or bathing). It can also occur after cleaning too deeply in the ear canal with a cotton swab or other object. Sometimes, hair care products get into the ear canal and cause this problem.  Symptoms can include pain, fever, itching, redness, drainage, or swelling of the ear canal. Temporary hearing loss may also occur.  Home care    Do not try to clean the ear canal. This can push pus and bacteria deeper into the canal.    Use prescribed ear drops as directed. These help reduce swelling and fight the infection. If an ear wick was placed in the ear canal, apply drops right onto the end of the wick. The wick will draw the medicine into the ear canal even if it is swollen closed.    A cotton ball may be loosely placed in the outer ear to absorb any drainage.    You may use acetaminophen or ibuprofen to control pain, unless another medicine was prescribed. Note: If you have chronic liver or kidney disease or ever had a stomach ulcer or GI bleeding, talk to your healthcare provider before taking any of these medicines.    Do not allow water to get into your ear when bathing. Also, don't swim until the infection has cleared.  Prevention    Keep your ears dry. This helps lower the risk of infection. Dry your ears with a towel or hair dryer after getting wet. Also, use ear plugs when swimming.    Do not stick any objects in the ear to remove wax.    If you feel water trapped in your ear, use ear drops right away. You can  get these drops over the counter at most drugstores. They work by removing water from the ear canal.  Follow-up care  Follow up with your healthcare provider in 1 week, or as advised.  When to seek medical advice  Call your healthcare provider right away if any of these occur:    Ear pain becomes worse or doesnt improve after 3 days of treatment    Redness or swelling of the outer ear occurs or gets worse    Headache    Painful or stiff neck    Drowsiness or confusion    Fever of 100.4 F (38 C) or higher, or as directed by your healthcare provider    Seizure  Date Last Reviewed: 10/1/2017    [id number] [employer]. [address] [place] [address]. All rights reserved. This information is not intended as a substitute for professional medical care. Always follow your healthcare professional's instructions.

## 2021-06-17 NOTE — PROGRESS NOTES
ASSESSMENT/PLAN:  1. DM (diabetes mellitus), type 2  Beautifully controlled on current meds will continue same.  - Glycosylated Hemoglobin A1c  - [facility] RED    2. Essential hypertension with goal blood pressure less than 130/80  Good at this time.  If he needs change of medicine, that has to do with just blood pressure rather than rate control, I would prefer that the losartan be increased rather than the beta-blocker.  He has some micro-proteinuria that would benefit higher dose ARB but his blood pressure is not high enough at this time to warrant change.    3.  Rhythm disturbance- he had some NSVT and was started on a beta-blocker for that.  Higher dose made him feel a little uncomfortable.  Currently fine and stable on this dose.        Patient Instructions   Please note that if over the counter medications were taken off of your medication list, it is not because you are being asked to stop taking them.  does not want all of the over the counter medications on your medication list, as it bogs down the list.     Please call your insurance company and discuss Shingrix vaccine. This is a series of 2 injections that MUST be given 2-6 months apart and will cost around $287.00 here at [facility].    Due for your eye exam    Continue with the same medications.     Nice work on the weight loss! Keep it up.     Return in about four months for a diabetic check.     Have Dr. Marrero contact Dr. Zamudio if he plans to make changes to the medication for blood pressure.         Return in about 4 months (around 7/30/2018) for diabetes.    CHIEF COMPLAINT:  Chief Complaint   Patient presents with     Diabetes       HISTORY OF PRESENT ILLNESS:  Dima Gilliland is a 67 y.o. male presenting to the clinic today for a diabetic check.     Diabetes: His last hemoglobin A1c was 6.4% on 11/30/2017. His hemoglobin A1c is 6.0% today. He has been trying to eat right and has lost some weight. He plans to keep up the better  diet. He continues to take 1,000 mg of metformin twice daily.     Hypertension: His blood pressure is in a good range today. He continues to take losartan 50 mg daily. He has also been taking metoprolol 25 mg daily.     Cardiac Pacemaker In Situ: He was seen by Dr. Marrero for a problem with his pacemaker. He believes his heart was running too fast. He had an echo and was started on metoprolol 25 mg. He tried taking 50 mg daily for a little while, but he did not tolerate it, so he dropped back down to 25 mg daily.     REVIEW OF SYSTEMS:   He has lost 9 pounds since November according to our scale. Comprehensive review of systems negative except as noted above.    PFSH:  He has made some dietary changes for the better.     TOBACCO USE:  History   Smoking Status     Former Smoker     Packs/day: 25.00     Years: 1.00     Quit date: 1/1/2006   Smokeless Tobacco     Never Used       VITALS:  Vitals:    03/30/18 1039   BP: 128/72   Pulse: 60   Weight: 217 lb 11.2 oz (98.7 kg)     Wt Readings from Last 3 Encounters:   03/30/18 217 lb 11.2 oz (98.7 kg)   02/06/18 220 lb (99.8 kg)   11/30/17 (!) 226 lb 12.8 oz (102.9 kg)     Body mass index is 31.92 kg/(m^2).    The following high BMI interventions were performed this visit: weight monitoring    PHYSICAL EXAM:  General Appearance: Alert, cooperative, no distress, appears stated age.  Lungs: Clear to auscultation bilaterally, good air movement.  Heart: Regular rate and rhythm, S1 and S2 normal, no murmur or bruit.  Extremities: No CCE, pulses II/IV and symmetric,   Diabetic foot exam done  Psychiatric: he has a normal mood and affect.     Notes Reviewed, additional history from source other than patient (2 TOTAL): None.    Accessed Care Everywhere, Requested Records, Consult with Physician (1 TOTAL): None.     Radiology tests summarized or ordered (XR, CT, MRI, DXA, US): None.    Labs reviewed or ordered (1 TOTAL): Labs from 11/30/2017 reviewed; CBC just below normal,  microalbumin positive but better than previous years, CMP.  Labs ordered.     Medicine tests reviewed or ordered (ECG, echocardiogram, colonoscopy, EGD, venous US) (1 TOTAL): Reviewed 2/12/2018 stress echo - normal, LVEF 55%    Independent review of ECG or XR (2 EACH): None.    MEDICATIONS:  Current Outpatient Prescriptions   Medication Sig Dispense Refill     aspirin 81 MG EC tablet Take 81 mg by mouth daily.       blood glucose test (ACCU-CHEK BILLY PLUS TEST STRP) strips Use 1 each As Directed 2 (two) times a day. Dispense brand per patient's insurance at pharmacy discretion.(E11.9) 200 strip 3     generic lancets Check sugars once daily 2 each 6     losartan (COZAAR) 50 MG tablet Take 1 tablet (50 mg total) by mouth daily. 90 tablet 3     metFORMIN (GLUCOPHAGE) 1000 MG tablet Take 1 tablet (1,000 mg total) by mouth 2 (two) times a day. 180 tablet 3     metoprolol succinate (TOPROL-XL) 50 MG 24 hr tablet Take 1 tablet (50 mg total) by mouth daily. Monitor blood pressure and pulse, watch for dizziness and fatigue. (Patient taking differently: Take 25 mg by mouth daily. Monitor blood pressure and pulse, watch for dizziness and fatigue.) 90 tablet 3     nitroglycerin (NITROSTAT) 0.4 MG SL tablet Place 0.4 mg under the tongue every 5 (five) minutes as needed for chest pain.       rosuvastatin (CRESTOR) 10 MG tablet TAKE ONE TABLET BY MOUTH AT BEDTIME  90 tablet 2     No current facility-administered medications for this visit.        The visit lasted a total of 10 minutes face to face with the patient. Over 50% of the time was spent counseling and educating the patient about his diabetes.    I, Arley Calderon, am scribing for and in the presence of, Dr. Zamudio.    I, Dr. Zamudio, personally performed the services described in this documentation, as scribed by Arley Calderon in my presence, and it is both accurate and complete.    Dragon dictation was used for this note.  Speech recognition errors are a  possibility.      Total data points: 2

## 2021-06-17 NOTE — PATIENT INSTRUCTIONS - HE
Patient Instructions by Miky Martinez MD at 12/18/2019  3:10 PM     Author: Miky Martinez MD Service: -- Author Type: Physician    Filed: 12/18/2019  4:11 PM Encounter Date: 12/18/2019 Status: Signed    : Miky Martinez MD (Physician)         Patient Education     Open Toe Fracture  Your big toe is broken (fractured) and you have a nearby cut (laceration), puncture, or deep scrape. This causes local pain, swelling, and bruising. Because of the open injury, you are at risk for an infection in the skin and bone. You will be given antibiotics to lower the risk for infection.  Any cut will take about 10 days to heal. The fracture takes about 4 weeks to heal. Toe injuries are often treated by taping the injured toe to the next one (shayy taping) or using a protective boot. This protects the injured toe and holds it in position.  If the toenail has been severely injured, it may fall off in 1 to 2 weeks. It takes up to 12 months for a new toenail to grow back.  Home care  The following guidelines will help you care for your wound at home:    You may be given a cast shoe or boot to wear to keep your toe from moving. If not, you can use a sandal or any shoe that doesnt put pressure on the injured toe until the swelling and pain go away. If using a sandal, be careful not to strike your foot against anything. Another injury could make the fracture worse. If you were given crutches, dont put full weight on the injured foot until you can do so without pain.    Keep your foot elevated to reduce pain and swelling. When sleeping, put a pillow under the injured leg. When sitting, support the injured leg so it is level with your waist. This is very important during the first 2 days (48 hours).    Put an ice pack on the injured area. Do this for 20 minutes every 1 to 2 hours the first day for pain relief. You can make an ice pack by wrapping a plastic bag of ice cubes in a thin towel. As the ice melts, be careful  that any cloth or paper tape doesnt get wet. Continue using the ice pack 3 to 4 times a day for the next 2 days. Then use the ice pack as needed to ease pain and swelling.    You may use acetaminophen or ibuprofen to control pain, unless another pain medicine was prescribed. If you have chronic liver or kidney disease, talk with your healthcare provider before using these medicines. Also talk with your provider if youve had a stomach ulcer or gastrointestinal bleeding.    If buddy tape was used and it becomes wet or dirty, change it. You may replace it with paper, plastic, or cloth tape. Cloth tape and paper tapes must be kept dry. Keep the buddy tape in place for 4 weeks, or as instructed.    Keep the wound clean and dry. If a bandage was put on and it becomes wet or dirty, replace it. Otherwise, leave it in place for the first 24 hours.    If stitches were used, clean the wound every day:  ? Take off the bandage. Wash the area with soap and water. Use a wet cotton swab to loosen and remove any blood or crust that forms.  ? After cleaning, put a thin layer of antibiotic ointment on the area, if instructed. This will keep the wound clean and make it easier to remove the stitches. Put a fresh bandage on.  ? You may take off the bandage to shower as usual after the first 24 hours. But dont soak the area in water until the stitches are removed. Dont take a tub bath or go swimming during this time.    If surgical tape was used, keep the area clean and dry. If it becomes wet, blot it dry with a towel.    Take any prescribed antibiotics until they are gone.    You may go back to sports or physical education activities after 4 weeks, or when you can run without pain. Stay away from the activity that led to the injury during this time. Choose another activity in the meantime. Gradually go back to your usual activities. Stop any activities if the pain comes back.    You may need surgery for more severe injuries, but this is  rare.  Follow-up care  Follow up with your healthcare provider in 1 week, or as advised. This is to make sure the bone is healing the way it should. Most skin wounds heal within 10 days. But even with proper treatment, a wound infection may sometimes occur. Thats why its important to check the wound every day for signs of infection listed below.  Stitches should be removed in 7 to 14 days. If surgical tape closures were used, you can take them off yourself if they havent fallen off after 10 days.  You will be told of any new findings that may affect your care.  When to seek medical advice  Call your healthcare provider right away if any of these occur:    Redness, warmth, swelling, drainage from the wound, or foul odor    Fever of 100.4 F (38 C) or higher, or as directed by your healthcare provider    Stitches come apart or surgical tape closures fall off before 7 days    Wound edges reopen    Bleeding that is not controlled with direct pressure    Toe becomes cold, blue, numb, or tingly    Pain get worse or isnt well controlled by prescribed pain medicines    You cant put weight on your foot   Date Last Reviewed: 5/1/2017 2000-2017 [employer]. [address] [place] [address], [place] [address]. All rights reserved. This information is not intended as a substitute for professional medical care. Always follow your healthcare professional's instructions.

## 2021-06-18 NOTE — PROGRESS NOTES
1. Tick bite of abdominal wall, initial encounter        Plan I did remove a bit of what seems to be part of the tick from the wound.  The wound looks healthy and not infected.  It was covered with antibiotic ointment and a Band-Aid.  I did discuss Lyme disease symptoms with him.  He will look for any flulike symptoms, red rash with clearing in the middle, joint aches muscle aches etc. and follow-up with us here if they appear.    Subjective: 68-year-old male who is here today with a tick bite.  He states that he noticed a tick yesterday in the left abdominal wall.  He removed it, and it was a deer tick.  He does bring it in for me to confirm that yes it was not Zyrtec, and he believes there might be some of it left in the wound.  He is otherwise been asymptomatic.  [place]    Objective: Well-appearing male in no acute distress.  Vital signs as noted.  He does have this tick bite as mentioned in the left abdominal wall, I did remove a tiny part of what appears to be part of the take left over, and the wound looks to be good.

## 2021-06-18 NOTE — LETTER
Letter by Neil Lomeli MD at      Author: Neil Lomeli MD Service: -- Author Type: --    Filed:  Encounter Date: 3/5/2019 Status: (Other)       Dima Gilliland  [address] [facility] [address]             March 5, 2019         Dear Mr. Gilliland,    Below are the results from your recent visit:    Resulted Orders   Basic Metabolic Panel   Result Value Ref Range    Sodium 139 136 - 145 mmol/L    Potassium 4.4 3.5 - 5.0 mmol/L    Chloride 103 98 - 107 mmol/L    CO2 26 22 - 31 mmol/L    Anion Gap, Calculation 10 5 - 18 mmol/L    Glucose 215 (H) 70 - 125 mg/dL    Calcium 9.5 8.5 - 10.5 mg/dL    BUN 19 8 - 28 mg/dL    Creatinine 1.47 (H) 0.70 - 1.30 mg/dL    GFR MDRD Af Amer 57 (L) >60 mL/min/1.73m2    GFR MDRD Non Af Amer 47 (L) >60 mL/min/1.73m2    Narrative    Fasting Glucose reference range is 70-99 mg/dL per  American Diabetes Association (ADA) guidelines.       Creatinine is slightly better at 1.47, but still elevated above your baseline.    Reduce losartan dose down to 25 mg a day by cutting pills in half.    Follow-up with me in clinic in 2-3 weeks to reevaluate this.    Please call with questions or contact us using [employer].    Sincerely,        Electronically signed by Neil Lomeli MD

## 2021-06-18 NOTE — LETTER
Letter by Neil Lomeli MD at      Author: Neil Lomeli MD Service: -- Author Type: --    Filed:  Encounter Date: 2/28/2019 Status: (Other)       Dima Gilliland  [address] [facility] [address]             February 28, 2019         Dear Mr. Gilliland,    Below are the results from your recent visit:    Resulted Orders   Comprehensive Metabolic Panel   Result Value Ref Range    Sodium 140 136 - 145 mmol/L    Potassium 4.7 3.5 - 5.0 mmol/L    Chloride 105 98 - 107 mmol/L    CO2 22 22 - 31 mmol/L    Anion Gap, Calculation 13 5 - 18 mmol/L    Glucose 133 (H) 70 - 125 mg/dL    BUN 25 8 - 28 mg/dL    Creatinine 1.52 (H) 0.70 - 1.30 mg/dL    GFR MDRD Af Amer 55 (L) >60 mL/min/1.73m2    GFR MDRD Non Af Amer 46 (L) >60 mL/min/1.73m2    Bilirubin, Total 0.5 0.0 - 1.0 mg/dL    Calcium 9.5 8.5 - 10.5 mg/dL    Protein, Total 7.0 6.0 - 8.0 g/dL    Albumin 4.2 3.5 - 5.0 g/dL    Alkaline Phosphatase 69 45 - 120 U/L    AST 20 0 - 40 U/L    ALT 23 0 - 45 U/L    Narrative    Fasting Glucose reference range is 70-99 mg/dL per  American Diabetes Association (ADA) guidelines.   Glycosylated Hemoglobin A1c   Result Value Ref Range    Hemoglobin A1c 6.0 3.5 - 6.0 %   Microalbumin, Random Urine   Result Value Ref Range    Microalbumin, Random Urine 8.65 (H) 0.00 - 1.99 mg/dL    Creatinine, Urine 141.1 mg/dL    Microalbumin/Creatinine Ratio Random Urine 61.3 (H) <=19.9 mg/g    Narrative    Microalbumin, Random Urine  <2.0 mg/dL . . . . . . . . Normal  3.0-30.0 mg/dL . . . . . . Microalbuminuria  >30.0 mg/dL . . . . . .  . Clinical Proteinuria    Microalbumin/Creatinine Ratio, Random Urine  <20 mg/g . . . . .. . . . Normal   mg/g . . . . . . . Microalbuminuria  >300 mg/g . . . . . . . . Clinical Proteinuria         Excellent control of diabetes with hemoglobin A1c of 6%.    Creatinine is mildly higher than your baseline.  You may have been somewhat dehydrated on this day.  Recheck your creatinine level on March  6.    Urine microalbumin level is moderately high.  Continue on current losartan at this time.    Liver tests are normal.    Please call with questions or contact us using MyChart.    Sincerely,        Electronically signed by Neil Lomeli MD

## 2021-06-19 NOTE — LETTER
Letter by Bandar Hoffmann CNP at      Author: Bandar Hoffmann CNP Service: -- Author Type: --    Filed:  Encounter Date: 9/27/2019 Status: Signed         Dima Gilliland  [address] [facility] [address]             September 27, 2019         Dear Mr. Gilliland,    Below are the results from your recent visit:    Resulted Orders   Basic Metabolic Panel   Result Value Ref Range    Sodium 137 136 - 145 mmol/L    Potassium 4.7 3.5 - 5.0 mmol/L    Chloride 105 98 - 107 mmol/L    CO2 26 22 - 31 mmol/L    Anion Gap, Calculation 6 5 - 18 mmol/L    Glucose 132 (H) 70 - 125 mg/dL    Calcium 9.4 8.5 - 10.5 mg/dL    BUN 24 8 - 28 mg/dL    Creatinine 1.32 (H) 0.70 - 1.30 mg/dL    GFR MDRD Af Amer >60 >60 mL/min/1.73m2    GFR MDRD Non Af Amer 53 (L) >60 mL/min/1.73m2    Narrative    Fasting Glucose reference range is 70-99 mg/dL per  American Diabetes Association (ADA) guidelines.       Kidney labs are looking much better.  No change in medications or treatment plan for now.    Please call with questions or contact us using [employer].    Sincerely,        Electronically signed by Bandar Hoffmann CNP

## 2021-06-19 NOTE — LETTER
Letter by Bandar Hoffmann CNP at      Author: Bandar Hoffmann CNP Service: -- Author Type: --    Filed:  Encounter Date: 7/26/2019 Status: (Other)         Dima Gilliland  [address] [facility] [address]             July 26, 2019         Dear Mr. Gilliland,    Below are the results from your recent visit:    Resulted Orders   Troponin I   Result Value Ref Range    Troponin I <0.01 0.00 - 0.29 ng/mL   Comprehensive Metabolic Panel   Result Value Ref Range    Sodium 138 136 - 145 mmol/L    Potassium 4.9 3.5 - 5.0 mmol/L    Chloride 105 98 - 107 mmol/L    CO2 23 22 - 31 mmol/L    Anion Gap, Calculation 10 5 - 18 mmol/L    Glucose 104 70 - 125 mg/dL    BUN 37 (H) 8 - 28 mg/dL    Creatinine 1.75 (H) 0.70 - 1.30 mg/dL    GFR MDRD Af Amer 47 (L) >60 mL/min/1.73m2    GFR MDRD Non Af Amer 39 (L) >60 mL/min/1.73m2    Bilirubin, Total 0.6 0.0 - 1.0 mg/dL    Calcium 10.2 8.5 - 10.5 mg/dL    Protein, Total 7.1 6.0 - 8.0 g/dL    Albumin 4.4 3.5 - 5.0 g/dL    Alkaline Phosphatase 65 45 - 120 U/L    AST 19 0 - 40 U/L    ALT 25 0 - 45 U/L    Narrative    Fasting Glucose reference range is 70-99 mg/dL per  American Diabetes Association (ADA) guidelines.   HM2(CBC w/o Differential)   Result Value Ref Range    WBC 9.2 4.0 - 11.0 thou/uL    RBC 4.83 4.40 - 6.20 mill/uL    Hemoglobin 13.3 (L) 14.0 - 18.0 g/dL    Hematocrit 40.5 40.0 - 54.0 %    MCV 84 80 - 100 fL    MCH 27.6 27.0 - 34.0 pg    MCHC 32.9 32.0 - 36.0 g/dL    RDW 12.8 11.0 - 14.5 %    Platelets 216 140 - 440 thou/uL    MPV 7.4 7.0 - 10.0 fL       Lab work from our encounter shows no emergent situation.  However, kidney function is a bit worse.  I would encourage you to schedule follow-up with me in 2 weeks for recheck of labs to ensure kidney function is not getting worse.    Please call with questions or contact us using [employer].    Sincerely,        Electronically signed by Bandar Hoffmann CNP

## 2021-06-19 NOTE — LETTER
Letter by Tita Del Cid at      Author: Tita Del Cid Service: -- Author Type: --    Filed:  Encounter Date: 11/21/2019 Status: Signed         Dima Gilliland  [address] [facility] [address]      November 21, 2019      Dear Mr. Gilliland,    RE: Remote Results    We are writing to you regarding your recent Remote Pacemaker check from home. Your transmission was received successfully. Battery status is satisfactory at this time.     Your results are showing no significant changes.    Your next device appointment will be a clinic visit.  Please call in December to schedule.      To schedule or reschedule, please call [phone] and press 1.    NOTE: If you would like to do an extra transmission, please call [phone] and press 3 to speak to a nurse BEFORE transmitting. This ensures that the Device Clinic staff is aware of the reason you are sending a transmission, and can follow-up with you after it has been reviewed.    We will be checking your implanted device from home (remotely) every three months unless otherwise instructed. We will need to see you in the clinic at least once a year. You may need to be seen in the clinic sooner depending on the results of your check.    Please be aware:    The follow-up schedule is like a Physician prescription.    Your remote monitor is paired to your specific implanted device.      Sincerely,    [facility]

## 2021-06-19 NOTE — LETTER
Letter by Neil Lomeli MD at      Author: Neil Lomeli MD Service: -- Author Type: --    Filed:  Encounter Date: 3/21/2019 Status: (Other)         Dima Gilliland  [address] [facility] [address]             March 21, 2019         Dear Mr. Gilliland,    Below are the results from your recent visit:    Resulted Orders   Basic Metabolic Panel   Result Value Ref Range    Sodium 141 136 - 145 mmol/L    Potassium 4.4 3.5 - 5.0 mmol/L    Chloride 105 98 - 107 mmol/L    CO2 22 22 - 31 mmol/L    Anion Gap, Calculation 14 5 - 18 mmol/L    Glucose 173 (H) 70 - 125 mg/dL    Calcium 9.7 8.5 - 10.5 mg/dL    BUN 22 8 - 28 mg/dL    Creatinine 1.56 (H) 0.70 - 1.30 mg/dL    GFR MDRD Af Amer 54 (L) >60 mL/min/1.73m2    GFR MDRD Non Af Amer 44 (L) >60 mL/min/1.73m2    Narrative    Fasting Glucose reference range is 70-99 mg/dL per  American Diabetes Association (ADA) guidelines.       Your creatinine does remain mildly elevated at 1.5.  I suspect this is your new baseline creatinine, since you received IV contrast with your CT scan in December 2018.    Creatinine is minimally changed from previous check and your potassium level is normal.  Therefore, continue on current medications and follow-up in June as planned.    Please call with questions or contact us using [employer].    Sincerely,        Electronically signed by Neil Lomeli MD

## 2021-06-19 NOTE — PROGRESS NOTES
[facility] clinic Follow Up Note    Dima Gilliland   68 y.o. male    Date of Visit: 7/18/2018    Chief Complaint   Patient presents with     Follow-up     DM follow up     Subjective  Dima is here to establish care, previously with Dr. Wilkes.  Here for routine diabetes and hypertension follow-up appointment.    Overall patient is stable and feels he is doing well.    He is retired.  Still walks his dog every day, but admits to not walking regularly in the winter.    He is mild to moderately overweight.    His diabetes is well-controlled with metformin 1000 mg twice a day.  Blood sugars ranging between 100 120.  No GI symptoms with the Metformin.  No foot numbness.  No vision changes.  It has been over year since he seen the eye doctor.    Did have a urine microalbumin level of 97 in November 2017.  He has had borderline creatinines of 1.2-1.3.    Hypertension has been controlled with losartan 50 mg a day and Toprol-XL 25 mg a day.  He does not check his blood pressure on his own.  He only has rare occasional and mild orthostatic lightheaded symptoms.  Blood pressure in March was 120/72.    No lower extremity edema issues.    No history of heart failure or increasing shortness of breath.    No chest pain or chest pressure.    February 2018 he had a normal/negative stress echo.    Cardiac echo in May of this year showed ejection fraction 52% but otherwise normal except for a very mildly dilated aortic root of 4.1 cm.  No heart valve problems.    Palpitations of essentially resolved since being on the Metformin.  He has no history of atrial fibrillation or irregular heartbeat.    He is on Crestor 10 mg a day and tolerates well without significant myalgias and no right upper quadrant pain or jaundice.  Posterior well-controlled in 2016 with an LDL of 48 and HDL 42.    Baby aspirin a day no epigastric pain or history of bleeding.    He quit smoking in 2006.  No new cough or increasing shortness of  breath.  No mouth sores or dysphagia issues.    Fetal labs, cholecystectomy 2014, he had a kidney stone at that time but no recurrent renal issues since then.    He had a TURP in 2014 and now urinating well.  PSA was 2.0 and no nodule on exam on August 2017, will see urology next month.    Basal cell cancer years ago, saw dermatology in February negative exam and given a one-year follow-up.  No new skin lesions.    Colonoscopy January 2015 with one polyp and given a 5 year follow-up.  Bowels are normal and no blood in stool.    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  This resolved about 2 weeks ago.  No leg weakness or numbness residual.  No back pain now.    He just takes rare ibuprofen, not currently.    Patient had a tick bite last month but that healed up well without erythema migrans or fever development.  Not treated with antibiotics.    PMHx:    Past Medical History:   Diagnosis Date     Diabetes mellitus, type II (H)      High cholesterol      History of chest pain 1/2013    per pt thought to be over-exertion while shoveling snow     Hypertension      Sinus bradycardia seen on cardiac monitor 3/26/15    asystole 15 sce intervals     SSS (sick sinus syndrome) (H) 10/15/2015     Syncope     Echo nl, GXT neg, MRI head neg, US carotid neg     PSHx:    Past Surgical History:   Procedure Laterality Date     CARDIAC PACEMAKER PLACEMENT  3/26/15    [facility]     Cystoscopy Laser Cystolitholapaxy  08/26/2014     CYSTOSCOPY PROSTATE W/ LASER N/A 9/9/2014    Procedure: CYSTOSCOPY TRANSURETHRAL RESECTION PROSTATE;  Surgeon: Rocky Goyal MD;  Location: [facility];  Service:      CYSTOSCOPY W/ LITHOLAPAXY / EHL N/A 8/26/2014    Procedure: HOLMIUN LASER CYSTOLITHOLAPAXY;  Surgeon: Rocky Goyal MD;  Location: [facility];  Service:      GANGLION CYST EXCISION       INSERT /  "REPLACE / REMOVE PACEMAKER  2015     LAPAROSCOPIC CHOLECYSTECTOMY N/A 7/23/2014    Procedure: CHOLECYSTECTOMY LAPAROSCOPIC;  Surgeon: Brady Estevez MD;  Location: [facility] OR;  Service:      Immunizations:   Immunization History   Administered Date(s) Administered     Influenza [id number], 01/20/2010     Influenza high dose, seasonal 10/15/2015, 10/11/2016, 10/28/2017     Influenza, Seasonal, Inj PF IIV3 10/07/2010, 10/11/2011, 01/10/2013     Influenza, inj, historic,unspecified 11/02/2007, 10/08/2008     Influenza,seasonal quad, PF 11/16/2013, 09/22/2014     Pneumo Conj 13-V (2010&after) 01/22/2015     Pneumo Polysac 23-V 01/20/2010     Td,adult,historic,unspecified 10/16/2006, 09/26/2007     Tdap 10/11/2016     ZOSTER, LIVE 10/07/2010       ROS A comprehensive review of systems was performed and was otherwise negative    Medications, allergies, and problem list were reviewed and updated    Exam  /68  Pulse 62  Ht 5' 9.25\" (1.759 m)  Wt 214 lb (97.1 kg)  SpO2 97%  BMI 31.37 kg/m2  Appears well.  No jaundice.  Pupils and irises equal and reactive.  No thrush, pharynx is not crowded, normal.  Teeth in good condition.  No cervical or supraclavicular adenopathy.  No JVD.  Lungs are clear to auscultation with good respiratory excursion.  Heart is regular with no murmur rub or gallop.  Abdomen is mild to moderately overweight but nontender no hepatosplenomegaly or pulsatile mass.  No ankle edema.  Feet were examined and normal sensation, normal perfusion and normal skin without pre-ulcerative callus.    Assessment/Plan  1. DM (diabetes mellitus), type 2 (H)  Appears well controlled with metformin twice a day.  I did  him to work on more regular walking on a daily basis, continue on diabetic diet.    He was reminded to set up his yearly eye exam.  - Glycosylated Hemoglobin A1c    Mild nephropathy with elevated urine microalbumin last November.  Continue current losartan.    2. Pure " hypercholesterolemia  Well-controlled on Crestor 10 mg  - LDL Cholesterol, Direct    3. Essential hypertension with goal blood pressure less than 130/80  Well-controlled.  Continue losartan 50 mg a day.    4. Benign prostatic hyperplasia with urinary obstruction  Well-controlled posterior.  See urology next month for prostate exam and PSA check.    5. Cardiac pacemaker in situ  Sick sinus syndrome with syncope in 2015.  Has a pacemaker.  Device check reviewed May 2018 and no arrhythmia.  Less palpitations and ectopy on the low-dose metoprolol.    Echo otherwise okay.  Saw cardiology last October.    6. History of colonic polyps  5 year follow-up will be due January 2020    7. Medication monitoring encounter    - Comprehensive Metabolic Panel    Left leg radicular pain, likely a lumbar strain with left L5 nerve root compression, but symptoms have resolved.  Work on regular walking.  Follow-up as needed for recurrence.    History of basal cell cancer, one-year follow-up in February.    Return in 3 months (on 10/26/2018) for Annual physical.   Patient Instructions   Continue current medications.    Routine blood work today, results will be mailed to you.    Follow-up for your physical on October 26 as scheduled.    I would recommend walking at least 20 minutes every day, especially through the winter.    Schedule your routine yearly eye exam with your ophthalmologist, for diabetic check.    See urology next month for your prostate check and PSA check.    Neil Lomeli MD  I spent a total time with patient of over 25 minutes and over 50% coord care.  Time all face to face.      Current Outpatient Prescriptions   Medication Sig Dispense Refill     aspirin 81 MG EC tablet Take 81 mg by mouth daily.       losartan (COZAAR) 50 MG tablet Take 1 tablet (50 mg total) by mouth daily. 90 tablet 3     metFORMIN (GLUCOPHAGE) 1000 MG tablet TAKE ONE TABLET BY MOUTH TWICE DAILY  180 tablet 0     metoprolol succinate (TOPROL-XL) 50  MG 24 hr tablet Take 1 tablet (50 mg total) by mouth daily. Monitor blood pressure and pulse, watch for dizziness and fatigue. (Patient taking differently: Take 25 mg by mouth daily. Monitor blood pressure and pulse, watch for dizziness and fatigue.) 90 tablet 3     rosuvastatin (CRESTOR) 10 MG tablet TAKE ONE TABLET BY MOUTH AT BEDTIME  90 tablet 2     blood glucose test (ACCU-CHEK BILLY PLUS TEST STRP) strips Use 1 each As Directed 2 (two) times a day. Dispense brand per patient's insurance at pharmacy discretion.(E11.9) 200 strip 3     generic lancets Check sugars once daily 2 each 6     nitroglycerin (NITROSTAT) 0.4 MG SL tablet Place 0.4 mg under the tongue every 5 (five) minutes as needed for chest pain.       No current facility-administered medications for this visit.      No Known Allergies  Social History   Substance Use Topics     Smoking status: Former Smoker     Packs/day: 25.00     Years: 1.00     Quit date: 1/1/2006     Smokeless tobacco: Never Used     Alcohol use 1.2 oz/week     2 Cans of beer per week      Comment: Not in the last month or so

## 2021-06-19 NOTE — LETTER
Letter by Neil Lomeli MD at      Author: Neil Lomeli MD Service: -- Author Type: --    Filed:  Encounter Date: 6/28/2019 Status: (Other)         Dima Gilliland  [address] [facility] [address]             June 28, 2019         Dear Mr. Gilliland,    Below are the results from your recent visit:    Resulted Orders   Comprehensive Metabolic Panel   Result Value Ref Range    Sodium 139 136 - 145 mmol/L    Potassium 4.7 3.5 - 5.0 mmol/L    Chloride 105 98 - 107 mmol/L    CO2 25 22 - 31 mmol/L    Anion Gap, Calculation 9 5 - 18 mmol/L    Glucose 92 70 - 125 mg/dL    BUN 19 8 - 28 mg/dL    Creatinine 1.42 (H) 0.70 - 1.30 mg/dL    GFR MDRD Af Amer 60 (L) >60 mL/min/1.73m2    GFR MDRD Non Af Amer 49 (L) >60 mL/min/1.73m2    Bilirubin, Total 0.6 0.0 - 1.0 mg/dL    Calcium 9.8 8.5 - 10.5 mg/dL    Protein, Total 6.6 6.0 - 8.0 g/dL    Albumin 4.1 3.5 - 5.0 g/dL    Alkaline Phosphatase 56 45 - 120 U/L    AST 19 0 - 40 U/L    ALT 23 0 - 45 U/L    Narrative    Fasting Glucose reference range is 70-99 mg/dL per  American Diabetes Association (ADA) guidelines.   Glycosylated Hemoglobin A1c   Result Value Ref Range    Hemoglobin A1c 6.4 (H) 3.5 - 6.0 %       Creatinine is slightly improved.  Potassium level is normal.  Liver tests are normal.    Hemoglobin A1c shows diabetes is well controlled.    No change in treatment plan.    Please call with questions or contact us using [employer].    Sincerely,        Electronically signed by Neil Lomeli MD

## 2021-06-19 NOTE — LETTER
Letter by Shruthi oLja RDCS at      Author: Shruthi Loja RDCS Service: -- Author Type: --    Filed:  Encounter Date: 8/21/2019 Status: (Other)         Dima Gilliland  [address] [facility] [address]      August 21, 2019      Dear Mr. Gilliland,    RE: Remote Results    We are writing to you regarding your recent Remote Pacemaker check from home. Your transmission was received successfully. Battery status is satisfactory at this time.     Your results are being reviewed.  You will be contacted if further information is necessary.     Your next device appointment will be a remote check on November 21, 2019; this will occur automatically.    To schedule or reschedule, please call [phone] and press 1.    NOTE: If you would like to do an extra transmission, please call [phone] and press 3 to speak to a nurse BEFORE transmitting. This ensures that the Device Clinic staff is aware of the reason you are sending a transmission, and can follow-up with you after it has been reviewed.    We will be checking your implanted device from home (remotely) every three months unless otherwise instructed. We will need to see you in the clinic at least once a year. You may need to be seen in the clinic sooner depending on the results of your check.    Please be aware:    The follow-up schedule is like a Physician prescription.    Your remote monitor is paired to your specific implanted device.      Sincerely,    [facility]

## 2021-06-19 NOTE — LETTER
Letter by Ciera Cagle EPS at      Author: Ciera Cagle EPS Service: -- Author Type: --    Filed:  Encounter Date: 5/20/2019 Status: (Other)         Dima Gilliland  [address] [facility] [address]      May 20, 2019      Dear Mr. Gilliland,    RE: Remote Results    We are writing to you regarding your recent Remote Pacemaker check from home. Your transmission was received successfully. Battery status is satisfactory at this time.     Your results are within normal limits.    Your next device appointment will be a remote check on August 21st, 2019; this will occur automatically.    To schedule or reschedule, please call [phone] and press 1.    NOTE: If you would like to do an extra transmission, please call [phone] and press 3 to speak to a nurse BEFORE transmitting. This ensures that the Device Clinic staff is aware of the reason you are sending a transmission, and can follow-up with you after it has been reviewed.    We will be checking your implanted device from home (remotely) every three months unless otherwise instructed. We will need to see you in the clinic at least once a year. You may need to be seen in the clinic sooner depending on the results of your check.    Please be aware:    The follow-up schedule is like a Physician prescription.    Your remote monitor is paired to your specific implanted device.      Sincerely,    [facility]

## 2021-06-20 NOTE — LETTER
Letter by Leanne Escobar RN at      Author: Leanne Escobar RN Service: -- Author Type: --    Filed:  Encounter Date: 8/17/2020 Status: (Other)         Dima Gilliland  [address] [facility] [address]      August 17, 2020      Dear Mr. Gilliland,    RE: Remote Results    We are writing to you regarding your recent Remote Pacemaker check from home. Your transmission was received successfully. Battery status is satisfactory at this time.     Your results show no significant changes.     Your next device appointment will be a remote check on 11/16/2020; this will occur automatically.    To schedule or reschedule, please call [phone] and press 1.    NOTE: If you would like to do an extra transmission, please call [phone] and press 3 to speak to a nurse BEFORE transmitting. This ensures that the Device Clinic staff is aware of the reason you are sending a transmission, and can follow-up with you after it has been reviewed.    We will be checking your implanted device from home (remotely) every three months unless otherwise instructed. We will need to see you in the clinic at least once a year. You may need to be seen in the clinic sooner depending on the results of your check.    Please be aware:    The follow-up schedule is like a Physician prescription.    Your remote monitor is paired to your specific implanted device.      Sincerely,    [facility]

## 2021-06-20 NOTE — LETTER
Letter by Shruthi Loja RDCS at      Author: Shruthi Loja RDCS Service: -- Author Type: --    Filed:  Encounter Date: 5/13/2020 Status: (Other)         Dima  DONNY Gilliland  [address] [facility] [address]      May 13, 2020      Dear Mr. Gilliland,    RE: Remote Results    We are writing to you regarding your recent Remote Pacemaker check from home. Your transmission was received successfully. Battery status is satisfactory at this time.     Your results are showing no significant changes.    Your next device appointment will be a remote check on August 17, 2020; this will occur automatically.    To schedule or reschedule, please call [phone] and press 1.    NOTE: If you would like to do an extra transmission, please call [phone] and press 3 to speak to a nurse BEFORE transmitting. This ensures that the Device Clinic staff is aware of the reason you are sending a transmission, and can follow-up with you after it has been reviewed.    We will be checking your implanted device from home (remotely) every three months unless otherwise instructed. We will need to see you in the clinic at least once a year. You may need to be seen in the clinic sooner depending on the results of your check.    Please be aware:    The follow-up schedule is like a Physician prescription.    Your remote monitor is paired to your specific implanted device.      Sincerely,    [facility]

## 2021-06-21 NOTE — LETTER
Letter by Tita Del Cid at      Author: Tita Del Cid Service: -- Author Type: --    Filed:  Encounter Date: 5/17/2021 Status: (Other)         Dima  DONNY Gilliland  [address] [facility] [address]      May 17, 2021      Dear Mr. Gilliland,    RE: Remote Results    We are writing to you regarding your recent Remote Pacemaker check from home. Your transmission was received successfully. Battery status is satisfactory at this time.     Your results are within normal limits.    Your next device appointment will be a remote check on August 24, 2021; this will occur automatically.    To schedule or reschedule, please call [phone] and press 1.    NOTE: If you would like to do an extra transmission, please call [phone] and press 3 to speak to a nurse BEFORE transmitting. This ensures that the Device Clinic staff is aware of the reason you are sending a transmission, and can follow-up with you after it has been reviewed.    We will be checking your implanted device from home (remotely) every three months unless otherwise instructed. We will need to see you in the clinic at least once a year. You may need to be seen in the clinic sooner depending on the results of your check.    Please be aware:    The follow-up schedule is like a Physician prescription.    Your remote monitor is paired to your specific implanted device.      Sincerely,    [facility]

## 2021-06-21 NOTE — LETTER
Letter by Shruthi Loja RDCS at      Author: Shruthi Loja RDCS Service: -- Author Type: --    Filed:  Encounter Date: 2/15/2021 Status: (Other)         Dima Gilliland  [address] [facility] [address]      February 15, 2021      Dear Mr. Gilliland,    RE: Remote Results    We are writing to you regarding your recent Remote Pacemaker check from home. Your transmission was received successfully. Battery status is satisfactory at this time.     Your results are showing no significant changes.    Your next device appointment will be a remote check on May 17, 2021; this will occur automatically.    To schedule or reschedule, please call [phone] and press 1.    NOTE: If you would like to do an extra transmission, please call [phone] and press 3 to speak to a nurse BEFORE transmitting. This ensures that the Device Clinic staff is aware of the reason you are sending a transmission, and can follow-up with you after it has been reviewed.    We will be checking your implanted device from home (remotely) every three months unless otherwise instructed. We will need to see you in the clinic at least once a year. You may need to be seen in the clinic sooner depending on the results of your check.    Please be aware:    The follow-up schedule is like a Physician prescription.    Your remote monitor is paired to your specific implanted device.      Sincerely,    [facility]

## 2021-06-21 NOTE — PROGRESS NOTES
Assessment and Plan:       1. Health care maintenance  Moderate cardiovascular risk factors but well controlled.    Patient needs to focus on improved regular walking and losing some weight.  We discussed walking at the local [employer] or big box store near where he lives through the winter.  He does walk his dog every evening as well.      - Influenza High Dose, Seasonal    Sick sinus syndrome with syncope in 2015 now has a pacemaker.  Device report in August showed no arrhythmia.  Plan cardiac follow-up in the spring.    Prostate cancer screening and monitoring done to the urology office, follow-up with urology in August.  He did have PIN previously    2. DM (diabetes mellitus), type 2 (H)  Well-controlled on metformin.  I encouraged him to increase regularity of walking.    Yearly follow-up with ophthalmology in August.    Check urine microalbumin at next visit  - Glycosylated Hemoglobin A1c    3. Pure hypercholesterolemia  Well-controlled on Crestor 10 mg.  Aspirin 81 mg.  - Lipid Cascade    4. Essential hypertension with goal blood pressure less than 130/80  Well-controlled on losartan and tolerating the lower dose of metoprolol.    5. History of colonic polyps  Colonoscopy January 2015 with one polyp, 5-year follow-up     Hemorrhoids from recent stressful and busy work.  Already improved with regular Metamucil use which she will continue.  6. History of basal cell cancer  One-year follow-up with dermatology this winter, patient will make an appointment.  No new lesions.  Some itchiness with seborrheic keratoses.    7. Medication monitoring encounter    - Comprehensive Metabolic Panel  - HM2(CBC w/o Differential)    8. HTN (hypertension)  Well-controlled.  Continue current losartan, and he continues to cut in half his metoprolol to 25 mg a day.  - metoprolol succinate (TOPROL-XL) 50 MG 24 hr tablet; Take 0.5 tablets (25 mg total) by mouth daily.    9. Routine general medical examination at a [facility]  facility  Lower back strain, resolved.  Encourage patient to walk on a more regular basis.    The patient's current medical problems were reviewed.    I have had an Advance Directives discussion with the patient.patient is full code     the following health maintenance schedule was reviewed with the patient and provided in printed form in the after visit summary:   Health Maintenance   Topic Date Due     DIABETES OPHTHALMOLOGY EXAM  12/08/2017     DIABETES FOLLOW-UP  05/30/2018     INFLUENZA VACCINE RULE BASED (1) 08/01/2018     DIABETES URINE MICROALBUMIN  11/30/2018     FALL RISK ASSESSMENT  11/30/2018     DIABETES HEMOGLOBIN A1C  01/18/2019     DIABETES FOOT EXAM  03/30/2019     COLONOSCOPY  01/15/2020     ADVANCE DIRECTIVES DISCUSSED WITH PATIENT  07/15/2020     TD 18+ HE  10/11/2026     PNEUMOCOCCAL POLYSACCHARIDE VACCINE AGE 65 AND OVER  Completed     PNEUMOCOCCAL CONJUGATE VACCINE FOR ADULTS (PCV13 OR PREVNAR)  Completed     ZOSTER VACCINE  Completed        Subjective:   Chief Complaint: Dima Gilliland is an 68 y.o. male here for an Annual Wellness visit.   HPI: His main issues is cardiovascular risk factors with diabetes, hypertension and hypercholesterolemia.  They are all well controlled.    He has some borderline renal insufficiency with a creatinine of 1.3 and a microalbumin level of 90 7 November 2017.    Blood pressure is well controlled on losartan 50 mg a day and metoprolol 25 mg a day.  Blood pressure in August was 112/68.  He denies significant orthostasis.  No edema.    He walks the dog regularly every evening, but does not have a regular exercise plan, especially for the winter.  No exertional symptoms.  No chest pain or increasing shortness of breath.  No leg claudication.    He quit smoking in 2006.    Diabetes type 2 is well controlled blood sugars right around 100-120.  Blood sugar was 115 this morning.  Hemoglobin A1c in July was 6.3%.  On metformin thousand milligrams twice daily.  No  GI side effects.  No foot neuropathy or sores.  Ophthalmology August 2000 16- for retinopathy.    Hypercholesterolemia well-controlled on Crestor 10 mg a day.  No generalized myalgias or right upper quadrant pain.  Liver tests were normal in July.  LDL level was 49 in July.    Stress echo February 2018 was negative.  Iliac echo May 2018 showed a mildly dilated aortic root at 4.1 cm.  Ejection fraction 52%.  No heart valve problems.    No palpitations or history of atrial fibrillation.  Her graph sleeps adequately at night and sleep apnea has not been diagnosed.    He is urinating normally after TURP in 2014.  There was a small area of PIN noted on biopsy at that time.  He was seen by urology in August.  No nodule.  Urinalysis was negative.  Postvoid residual was 0.  He has a was 3.5 in August and given a one-year follow-up.    No flare of his lower back pain that he had in May.  He had some intermittent left L5 radicular pain at that time.  It was after some heavy lifting.    Basal cell cancer in the past.  No new skin lesions, just some mild itchiness with seborrheic keratosis.  He will plan to see dermatology for his yearly follow-up this winter.    Status post cholecystectomy.  No significant dyspepsia or swallowing problems.  No mouth sores.    No headache complaints.  Some mild postnasal drip type cough which is chronic but not worse.  No hemoptysis.  Last chest x-ray was clear in 2015.    Diet is fairly good, he stopped snacking in the afternoon.    No epigastric pain or bleeding on low-dose aspirin.    He said some mild hemorrhoidal issues but that is better after taking Metamucil daily.    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    Review of Systems:    Please see above.  The rest of the review of systems are negative for all systems.    Patient Care Team:  Boby Zamudio MD as PCP - General  Brady Marrero MD as Physician  (Cardiology)  Rocky Goyal MD as Physician (Urology)     Patient Active Problem List   Diagnosis     Nontoxic Autonomous Thyroid Nodule     DM (diabetes mellitus), type 2 (H)     Pure hypercholesterolemia     Benign prostatic hyperplasia with urinary obstruction     Nephrolithiasis     Cardiac pacemaker in situ     Essential hypertension with goal blood pressure less than 130/80     Adenomatous colon polyp (01/2015)     History of colonic polyps     History of basal cell cancer     Past Medical History:   Diagnosis Date     Diabetes mellitus, type II (H)      High cholesterol      History of chest pain 1/2013    per pt thought to be over-exertion while shoveling snow     Hypertension      Sinus bradycardia seen on cardiac monitor 3/26/15    asystole 15 sce intervals     SSS (sick sinus syndrome) (H) 10/15/2015     Syncope     Echo nl, GXT neg, MRI head neg, US carotid neg      Past Surgical History:   Procedure Laterality Date     CARDIAC PACEMAKER PLACEMENT  3/26/15    "[employer]"     Cystoscopy Laser Cystolitholapaxy  08/26/2014     CYSTOSCOPY PROSTATE W/ LASER N/A 9/9/2014    Procedure: CYSTOSCOPY TRANSURETHRAL RESECTION PROSTATE;  Surgeon: Rocky Goyal MD;  Location: [facility] [place];  Service:      CYSTOSCOPY W/ LITHOLAPAXY / EHL N/A 8/26/2014    Procedure: HOLMIUN LASER CYSTOLITHOLAPAXY;  Surgeon: Rocky Goyal MD;  Location: [facility] [place];  Service:      GANGLION CYST EXCISION       INSERT / REPLACE / REMOVE PACEMAKER  2015     LAPAROSCOPIC CHOLECYSTECTOMY N/A 7/23/2014    Procedure: CHOLECYSTECTOMY LAPAROSCOPIC;  Surgeon: Brady Estevez MD;  Location: [facility] OR;  Service:       Family History   Problem Relation Age of Onset     Cancer Father      COPD Father      COPD Mother      Heart failure Mother       Social History     Social History     Marital status:      Spouse name: N/A     Number of children: N/A     Years of education: N/A     Occupational History      "Not on file.     Social History Main Topics     Smoking status: Former Smoker     Packs/day: 25.00     Years: 1.00     Quit date: 1/1/2006     Smokeless tobacco: Never Used     Alcohol use 1.2 oz/week     2 Cans of beer per week      Comment: Not in the last month or so     Drug use: No     Sexual activity: Yes     Partners: Female     Other Topics Concern     Not on file     Social History Narrative      Current Outpatient Prescriptions   Medication Sig Dispense Refill     aspirin 81 MG EC tablet Take 81 mg by mouth daily.       blood glucose test (ACCU-CHEK BILLY PLUS TEST STRP) strips Use 1 each As Directed 2 (two) times a day. Dispense brand per patient's insurance at pharmacy discretion.(E11.9) 200 strip 3     generic lancets Check sugars once daily 2 each 6     losartan (COZAAR) 50 MG tablet Take 1 tablet (50 mg total) by mouth daily. 90 tablet 3     metFORMIN (GLUCOPHAGE) 1000 MG tablet TAKE ONE TABLET BY MOUTH TWICE DAILY 180 tablet 0     metoprolol succinate (TOPROL-XL) 50 MG 24 hr tablet Take 0.5 tablets (25 mg total) by mouth daily.       nitroglycerin (NITROSTAT) 0.4 MG SL tablet Place 0.4 mg under the tongue every 5 (five) minutes as needed for chest pain.       rosuvastatin (CRESTOR) 10 MG tablet TAKE ONE TABLET BY MOUTH AT BEDTIME 90 tablet 0     No current facility-administered medications for this visit.       Objective:   Vital Signs:   Visit Vitals     /74 (Patient Site: Right Arm, Patient Position: Sitting, Cuff Size: Adult Large)     Pulse 64     Ht 5' 9.25\" (1.759 m)     Wt 213 lb 9.6 oz (96.9 kg)     BMI 31.32 kg/m2        VisionScreening:  No exam data present     PHYSICAL EXAM  Mildly overweight male.  Alert and oriented x3.  Normal mood and affect.  Pupils and irises equal and reactive.  Extraocular muscles intact.  No jaundice or conjunctivitis.  Word recall is normal.  Clock face drawing is normal.  Able to climb up on the exam table.  Gait is normal.  Muscle skeletal exam normal.  " External ears and nose exam is normal.  Minimal cerumen.  Tympanic membranes appear normal.  Pharynx shows just some mild postnasal drip pharyngitis but otherwise normal and no exudate.  No thrush.  Just mildly crowded pharynx and minimal neck adiposity.  Teeth in good condition.  No leukoplakia.  No cervical or supraclavicular or axillary or inguinal adenopathy.  No JVD and no carotid bruits.  No thyromegaly or nodularity.  Lungs clear to auscultation with normal respiratory excursion.  Spine is straight.  Seborrheic keratoses on back, not inflamed.  Heart is regular with no murmur rub or gallop.  +2 pedal pulses bilaterally.  No ankle edema.  Abdomen is mildly overweight but nontender no hepatosplenomegaly no pulsatile mass.  Testicles normal bilaterally and no inguinal hernia.  Prostate exam is that was done by his urologist in August and was negative    Assessment Results 10/26/2018   Activities of Daily Living No help needed   Instrumental Activities of Daily Living No help needed   Mini Cog Total Score 5   Some recent data might be hidden     A Mini-Cog score of 0-2 suggests the possibility of dementia, score of 3-5 suggests no dementia    Identified Health Risks:     The patient was counseled and encouraged to consider modifying their diet and eating habits. He was provided with information on recommended healthy diet options.  Information regarding advance directives (living [place]), including where he can download the appropriate form, was provided to the patient via the AVS.

## 2021-06-21 NOTE — LETTER
Letter by Tita Del Cid at      Author: Tita Del Cid Service: -- Author Type: --    Filed:  Encounter Date: 11/16/2020 Status: (Other)         Dima Gilliland  [address] [facility] [address]      November 16, 2020      Dear Mr. Gilliland,    RE: Remote Results    We are writing to you regarding your recent Remote Pacemaker check from home. Your transmission was received successfully. Battery status is satisfactory at this time.     Your results are showing no significant changes.    Your next device appointment will be a remote check on February 15, 2021; this will occur automatically. However, you are still due to see Dr. Marrero. Please call in December to schedule.     To schedule or reschedule, please call [phone] and press 1.    NOTE: If you would like to do an extra transmission, please call [phone] and press 3 to speak to a nurse BEFORE transmitting. This ensures that the Device Clinic staff is aware of the reason you are sending a transmission, and can follow-up with you after it has been reviewed.    We will be checking your implanted device from home (remotely) every three months unless otherwise instructed. We will need to see you in the clinic at least once a year. You may need to be seen in the clinic sooner depending on the results of your check.    Please be aware:    The follow-up schedule is like a Physician prescription.    Your remote monitor is paired to your specific implanted device.      Sincerely,    [facility]

## 2021-06-22 NOTE — PROGRESS NOTES
ASSESSMENT:  1. Acute right-sided low back pain with right-sided sciatica  Patient with recent onset of right low back pain with sciatica I presume there is underlying arthritis changes probably lumbar spinal stenosis.        PLAN:  1.  For now the patient will continue to be as active as possible and continue to take Motrin though also discussed trying Tylenol.  2.  Physical therapy has a potential future option.  3.  Discussed with the patient that at least at this time I would not pursue imaging studies.  4.  Follow up as needed.    No orders of the defined types were placed in this encounter.    There are no discontinued medications.    No Follow-up on file.    CHIEF COMPLAINT:  Chief Complaint   Patient presents with     Back Pain     on R side that he has noticed for about 1 week        SUBJECTIVE:  Dima Minor is a 68 y.o. male who presents with right lower back pain which began over a week ago. He explains that the pain starts in his lower back and radiates down his leg and into his lower leg. He notes that the pain wakes him up at night. Advil provides pain relief. Today is the best his back has felt in the last week so he hopes it is improving. He experienced a similar pain over 20 years ago and received three cortisone injections which took care of the problem.       REVIEW OF SYSTEMS:   All other systems are negative.    PFS:  Immunization History   Administered Date(s) Administered     Influenza L3y4-52, 01/20/2010     Influenza high dose, seasonal 10/15/2015, 10/11/2016, 10/28/2017, 10/26/2018     Influenza, Seasonal, Inj PF IIV3 10/07/2010, 10/11/2011, 01/10/2013     Influenza, inj, historic,unspecified 11/02/2007, 10/08/2008     Influenza,seasonal quad, PF 11/16/2013, 09/22/2014     Pneumo Conj 13-V (2010&after) 01/22/2015     Pneumo Polysac 23-V 01/20/2010     Td,adult,historic,unspecified 10/16/2006, 09/26/2007     Tdap 10/11/2016     ZOSTER, LIVE 10/07/2010     Social History     Socioeconomic  History     Marital status:      Spouse name: Not on file     Number of children: Not on file     Years of education: Not on file     Highest education level: Not on file   Social Needs     Financial resource strain: Not on file     Food insecurity - worry: Not on file     Food insecurity - inability: Not on file     Transportation needs - medical: Not on file     Transportation needs - non-medical: Not on file   Occupational History     Not on file   Tobacco Use     Smoking status: Former Smoker     Packs/day: 25.00     Years: 1.00     Pack years: 25.00     Last attempt to quit: 2006     Years since quittin.9     Smokeless tobacco: Never Used   Substance and Sexual Activity     Alcohol use: Yes     Alcohol/week: 1.2 oz     Types: 2 Cans of beer per week     Comment: Not in the last month or so     Drug use: No     Sexual activity: Yes     Partners: Female   Other Topics Concern     Not on file   Social History Narrative     Not on file     Past Medical History:   Diagnosis Date     Diabetes mellitus, type II (H)      High cholesterol      History of chest pain 2013    per pt thought to be over-exertion while shoveling snow     Hypertension      Sinus bradycardia seen on cardiac monitor 3/26/15    asystole 15 sce intervals     SSS (sick sinus syndrome) (H) 10/15/2015     Syncope     Echo nl, GXT neg, MRI head neg, US carotid neg     Family History   Problem Relation Age of Onset     Cancer Father      COPD Father      COPD Mother      Heart failure Mother        MEDICATIONS:  Current Outpatient Medications   Medication Sig Dispense Refill     aspirin 81 MG EC tablet Take 81 mg by mouth daily.       blood glucose test (ACCU-CHEK BILLY PLUS TEST STRP) strips Use 1 each As Directed 2 (two) times a day. Dispense brand per patient's insurance at pharmacy discretion.(E11.9) 200 strip 3     generic lancets Check sugars once daily 2 each 6     losartan (COZAAR) 50 MG tablet Take 1 tablet (50 mg total) by  mouth daily. 90 tablet 3     metFORMIN (GLUCOPHAGE) 1000 MG tablet TAKE ONE TABLET BY MOUTH TWICE DAILY 180 tablet 0     metoprolol succinate (TOPROL-XL) 50 MG 24 hr tablet Take 0.5 tablets (25 mg total) by mouth daily.       nitroglycerin (NITROSTAT) 0.4 MG SL tablet Place 0.4 mg under the tongue every 5 (five) minutes as needed for chest pain.       rosuvastatin (CRESTOR) 10 MG tablet TAKE ONE TABLET BY MOUTH AT BEDTIME  90 tablet 3     No current facility-administered medications for this visit.        TOBACCO USE:  Social History     Tobacco Use   Smoking Status Former Smoker     Packs/day: 25.00     Years: 1.00     Pack years: 25.00     Last attempt to quit: 2006     Years since quittin.9   Smokeless Tobacco Never Used       VITALS:  Vitals:    18 1453   BP: 132/78   Temp: 97.9  F (36.6  C)   TempSrc: Oral   Weight: 218 lb 4.8 oz (99 kg)     Wt Readings from Last 3 Encounters:   18 218 lb 4.8 oz (99 kg)   10/26/18 213 lb 9.6 oz (96.9 kg)   18 214 lb (97.1 kg)       PHYSICAL EXAM:  Constitutional:   Reveals a healthy appearing male.  Vitals: per nursing notes.  Musculoskeletal: No peripheral swelling.  Neuro:  Alert and oriented. Cranial nerves, motor, sensory exams are intact.  No gross focal deficits.  Psychiatric:  Memory intact, mood appropriate.    QUALITY MEASURES:      DATA REVIEWED:  Additional History from Old Records Summarized (2):   Decision to Obtain Records (1):   Radiology Tests Summarized or Ordered (1):   Labs Reviewed or Ordered (1):   Medicine Test Summarized or Ordered (1):   Independent Review of EKG, X-RAY, or RAPID STREP (2 each):     The visit lasted a total of 11 minutes face to face with the patient. Over 50% of the time was spent counseling and educating the patient about his above concerns.    By signing my name below, Elver GREEN, attest that this documentation has been prepared under the direction and in the presence of Dr. Augustin Villarreal.  Electronic  Signature: Fredi Graves. 12/7/2018 3:00 PM.    I, Dr. Villarreal, personally performed the services described in this documentation. All medical record entries made by the scribe were at my direction and in my presence. I have reviewed the chart and discharge instructions (if applicable) and agree that the record reflects my personal performance and is accurate and complete.      Total data points: 0

## 2021-06-23 NOTE — PROGRESS NOTES
Chief Complaint   Patient presents with     Ear Pain     Right Canal. Peroxide on a qtip. Been sore for a few days.         HPI    Patient is here for a few days of mild right ear pain. No fever, cough, nasal congestion, sore throat.     ROS: Pertinent ROS noted in HPI.     No Known Allergies    Patient Active Problem List   Diagnosis     Nontoxic Autonomous Thyroid Nodule     DM (diabetes mellitus), type 2 (H)     Pure hypercholesterolemia     Benign prostatic hyperplasia with urinary obstruction     Nephrolithiasis     Cardiac pacemaker in situ     Essential hypertension with goal blood pressure less than 130/80     Adenomatous colon polyp (2015)     History of colonic polyps     History of basal cell cancer       Family History   Problem Relation Age of Onset     Cancer Father      COPD Father      COPD Mother      Heart failure Mother        Social History     Socioeconomic History     Marital status:      Spouse name: Not on file     Number of children: Not on file     Years of education: Not on file     Highest education level: Not on file   Social Needs     Financial resource strain: Not on file     Food insecurity - worry: Not on file     Food insecurity - inability: Not on file     Transportation needs - medical: Not on file     Transportation needs - non-medical: Not on file   Occupational History     Not on file   Tobacco Use     Smoking status: Former Smoker     Packs/day: 25.00     Years: 1.00     Pack years: 25.00     Last attempt to quit: 2006     Years since quittin.0     Smokeless tobacco: Never Used   Substance and Sexual Activity     Alcohol use: Yes     Alcohol/week: 1.2 oz     Types: 2 Cans of beer per week     Comment: Not in the last month or so     Drug use: No     Sexual activity: Yes     Partners: Female   Other Topics Concern     Not on file   Social History Narrative     Not on file       Objective:    Vitals:    19 1410   BP: 128/72   Pulse: 64   Resp: 14   Temp:  97.9  F (36.6  C)   SpO2: 96%       Gen:NAD  Ears: L TM clear. L ear canal with small to moderate cerumen. Initial exam showed complete right cerumen impaction. After ear lavage exam showed mild erythema and inflammation of right ear canal with tiny bit of bleeding, R TM clear without effusion.    Infective otitis externa, right  -     ofloxacin (FLOXIN) 0.3 % otic solution; Administer ten drops to right ear once daily for seven days.    Impacted cerumen of right ear - ear wax removed by staff (informed patient that bleeding should stop without intervention, if not, advised putting a cotton ball in right ear and allow bleeding to stop otherwise f/u in ER).   -     Ear cerumen removal; Future

## 2021-06-24 NOTE — TELEPHONE ENCOUNTER
Tell patient his creatinine is slightly better at 1.47, but still elevated above baseline.    Have patient reduce his losartan down to 25 mg a day by cutting pills in half.    Make sure he is not taking any ibuprofen or Aleve or other NSAIDs other than his low-dose aspirin 81 mg.    Have him see me in 2-3 weeks, later this month to reevaluate his blood pressure and creatinine.  Okay to use a hold spot to work him in.

## 2021-06-24 NOTE — PATIENT INSTRUCTIONS - HE
Continue current medications.    Increase regular walking.  Goal to walk 20 minutes at least 3 times a week or more.    Routine follow-up in 4 months for checkup.

## 2021-06-24 NOTE — TELEPHONE ENCOUNTER
Patient notified. Non-fasting lab appointment set for 03/06.       Loretta Ojeda CMA  8:29 AM  2/27/2019

## 2021-06-24 NOTE — PROGRESS NOTES
[facility] clinic Follow Up Note    Dima Gilliland   68 y.o. male    Date of Visit: 2/26/2019    Chief Complaint   Patient presents with     4 month follow-up     Subjective  Erwin is here for routine follow-up of hypertension and diabetes.    Overall he feels well.  He is not been doing regular walking this winter, he states it is too cold to walk his dog.    He is out snowblowing, good exertional ability.  No chest pain or chest pressure or increasing shortness of breath.    Hypertension is been well controlled and he denies significant orthostasis, except for recently when working on the floor with his son and standing up, just brief mild orthostasis.  No syncope.    No palpitations, history of sick sinus syndrome with pacemaker placed in 2015.    No chest pain or pressure.  Stress echo negative February 2018.    He did have mildly dilated aortic root of 4.1 cm on echo in May of last year.    No history of sleep apnea diagnosed.    Tolerating Crestor 10 mg and aspirin 81 mg.  No epigastric pain.  No jaundice.    Patient had some gross hematuria in December of last year.  He had a negative cystoscopy.  CT scan of the abdomen December 2018 showed some small nonobstructing kidney stones.  Small renal cyst.  Peripheral vascular disease of the aorta.  No AAA.    Previous TURP in 2014 with some regrowth, urology felt that was the likely source of rebleeding.  PSA last August was 2.49.  No further evaluation of the hematuria planned at this time.    No current gross hematuria.    Bowels are normal.  January 2015 colonoscopy with 1 polyp and 5-year follow-up plan.  On Metamucil.  History of hemorrhoids.    He did see dermatology last week, history of basal cell cancer in the past.  Negative exam last week in 1 year follow-up planned.    Quit smoking in 2006.  No new cough.    He had some right otitis externa recently, earwax was cleaned out and that feels better now.    No flare of his back pain.    His blood  sugars are running around 130.  He is on metformin thousand milligrams twice a day.  Hemoglobin A1c was 6.2% last October.  Ophthalmology August 2018 was negative for retinopathy.  No vision changes.  No neuropathy or foot sores currently.    Micro albumin level was normal November 2017.    PMHx:    Past Medical History:   Diagnosis Date     Diabetes mellitus, type II (H)      High cholesterol      History of chest pain 1/2013    per pt thought to be over-exertion while shoveling snow     Hypertension      Sinus bradycardia seen on cardiac monitor 3/26/15    asystole 15 sce intervals     SSS (sick sinus syndrome) (H) 10/15/2015     Syncope     Echo nl, GXT neg, MRI head neg, US carotid neg     PSHx:    Past Surgical History:   Procedure Laterality Date     CARDIAC PACEMAKER PLACEMENT  3/26/15    [facility]     Cystoscopy Laser Cystolitholapaxy  08/26/2014     CYSTOSCOPY PROSTATE W/ LASER N/A 9/9/2014    Procedure: CYSTOSCOPY TRANSURETHRAL RESECTION PROSTATE;  Surgeon: Rocky Goyal MD;  Location: [facility];  Service:      CYSTOSCOPY W/ LITHOLAPAXY / EHL N/A 8/26/2014    Procedure: HOLMIUN LASER CYSTOLITHOLAPAXY;  Surgeon: Rocky Goyal MD;  Location: [facility];  Service:      GANGLION CYST EXCISION       INSERT / REPLACE / REMOVE PACEMAKER  2015     LAPAROSCOPIC CHOLECYSTECTOMY N/A 7/23/2014    Procedure: CHOLECYSTECTOMY LAPAROSCOPIC;  Surgeon: Brady Estevez MD;  [facility]: [facility];  Service:      Immunizations:   Immunization History   Administered Date(s) Administered     Influenza [id number], 01/20/2010     Influenza high dose, seasonal 10/15/2015, 10/11/2016, 10/28/2017, 10/26/2018     Influenza, Seasonal, Inj PF IIV3 10/07/2010, 10/11/2011, 01/10/2013     Influenza, inj, historic,unspecified 11/02/2007, 10/08/2008     Influenza,seasonal quad, PF 11/16/2013, 09/22/2014     Pneumo Conj 13-V (2010&after) 01/22/2015     Pneumo Polysac 23-V 01/20/2010      Td,adult,historic,unspecified 10/16/2006, 09/26/2007     Tdap 10/11/2016     ZOSTER, LIVE 10/07/2010       ROS A comprehensive review of systems was performed and was otherwise negative    Medications, allergies, and problem list were reviewed and updated    Exam  /80 (Patient Site: Right Arm, Patient Position: Sitting, Cuff Size: Adult Large)   Pulse 60   Resp 12   Wt 219 lb (99.3 kg)   BMI 32.11 kg/m    Appears well.  External ears canal is clear of debris, no otitis externa.  Tympanic membrane appears normal.  No pharyngitis or thrush.  Teeth in good condition.  No cervical adenopathy or JVD.  Heart is regular with no murmur rub or gallop.  Lungs are clear to auscultation.  Abdomen is mildly overweight, nontender no hepatospleno megaly.  No ankle edema.    Assessment/Plan  1. DM (diabetes mellitus), type 2 (H)  Well-controlled.  Continue current metformin.    Yearly eye exam in August.    I stressed the importance of regular exercise and encouraged him to increase regularity especially in the winter of his exercise.      - metFORMIN (GLUCOPHAGE) 1000 MG tablet; Take 1 tablet (1,000 mg total) by mouth 2 (two) times a day.  Dispense: 180 tablet; Refill: 3  - Glycosylated Hemoglobin A1c  - Microalbumin, Random Urine    Otitis externa, resolved.    2. Pure hypercholesterolemia  Well controlled on Crestor 10 mg.  Recheck cholesterol after October 2018.  He will be due for his physical exam in November.    3. Essential hypertension  Well-controlled.  Continue current losartan and metoprolol.  Mild orthostasis tolerable.    Borderline aortic root dilatation.  Consider repeat echo in the spring, at next appointment.    Will be due for a cardiology follow-up for his pacemaker this spring.    4. History of colonic polyps  5 your colonoscopy will be due January 2020.    5. History of basal cell cancer  1 year follow-up with dermatology, just checked.    6. Medication monitoring encounter    - Comprehensive  Metabolic Panel    Recent gross hematuria with negative workup.  Gross hematuria resolved.  Small nonobstructing stones noted.    He gets his PSA checked urology, which will be due this fall.    He is due for his physical exam in November.    Return in about 4 months (around 2019) for Recheck.   Patient Instructions   Continue current medications.    Increase regular walking.  Goal to walk 20 minutes at least 3 times a week or more.    Routine follow-up in 4 months for checkup.    Neil Lomeli MD  I spent a total time with patient of over 25 minutes and over 50% coord care.  Time all face to face.      Current Outpatient Medications   Medication Sig Dispense Refill     aspirin 81 MG EC tablet Take 81 mg by mouth daily.       blood glucose test (ACCU-CHEK BILLY PLUS TEST STRP) strips Use 1 each As Directed 2 (two) times a day. Dispense brand per patient's insurance at pharmacy discretion.(E11.9) 200 strip 3     generic lancets Check sugars once daily 2 each 6     losartan (COZAAR) 50 MG tablet TAKE ONE TABLET BY MOUTH ONE TIME DAILY  90 tablet 0     metoprolol succinate (TOPROL-XL) 50 MG 24 hr tablet Take 0.5 tablets (25 mg total) by mouth daily.       nitroglycerin (NITROSTAT) 0.4 MG SL tablet Place 0.4 mg under the tongue every 5 (five) minutes as needed for chest pain.       rosuvastatin (CRESTOR) 10 MG tablet TAKE ONE TABLET BY MOUTH AT BEDTIME  90 tablet 3     metFORMIN (GLUCOPHAGE) 1000 MG tablet Take 1 tablet (1,000 mg total) by mouth 2 (two) times a day. 180 tablet 3     No current facility-administered medications for this visit.      No Known Allergies  Social History     Tobacco Use     Smoking status: Former Smoker     Packs/day: 25.00     Years: 1.00     Pack years: 25.00     Last attempt to quit: 2006     Years since quittin.1     Smokeless tobacco: Never Used   Substance Use Topics     Alcohol use: Yes     Alcohol/week: 1.2 oz     Types: 2 Cans of beer per week     Comment: Not in the  last month or so     Drug use: No

## 2021-06-24 NOTE — PATIENT INSTRUCTIONS - HE
Please call my nurse Estee and her number is [phone].Please call with any heart related questions.

## 2021-06-24 NOTE — TELEPHONE ENCOUNTER
Tell patient on his recent lab work his creatinine, or kidney function test, creatinine was 1.5.  It is usually 1.3.    It is unclear if this is a significant change.  But, I would like patient to recheck his kidney labs.  If his creatinine remains elevated, he may need to adjust medication dosing.    Have him check a nonfasting lab next week.  Have him schedule lab appointment.

## 2021-06-24 NOTE — TELEPHONE ENCOUNTER
Patient notified, expresses understanding. Affirms that he is not taking any ibuprofen, Aleve, or other NSAIDs other than his low-dose aspirin 81 mg. Appointment made for 03/20/19.      Loretta Ojeda [facility]  4:48 PM  3/5/2019

## 2021-06-25 NOTE — PROGRESS NOTES
[facility] clinic Follow Up Note    Dima Gilliland   68 y.o. male    Date of Visit: 3/20/2019    Chief Complaint   Patient presents with     2 week follow-up     Recheck blood pressure and creatinine lab     Subjective  Erwin is here earlier than planned to reevaluate his blood pressure and creatinine.    He has had some mild renal insufficiency in the past with a baseline creatinine 1.2-1.4.    In February his creatinine was up to 1.5.  He was fasting at that time.  I had him recheck the creatinine on March 5 but it was about the same at 1.47.  At that time I had him reduce the losartan from 50 mg down to 25 mg a day.    In the past his blood pressure has been on the low side in the 115-120/70 range but he is denied orthostasis.    He is also on Toprol-XL 25 mg a day.    He does have a history of brief SVT which gave him symptomatic palpitations in the past.  He had sick sinus syndrome in 2015 had a pacemaker placed.  His pacemaker evaluation last month showed 4 mode switches of less than 1 minute there were likely SVT.  Less than 0 .1% burden.  Not on anticoagulation.    May 2018 cardiac echo showed aortic root dilatation of mild severity of 4.1 cm.  Ejection fraction 52%.  No aortic insufficiency and normal mitral valve.    No lower extremity edema.  No increasing shortness of breath with exertion.    He has started walking more.  He was not very active earlier this winter.  He walks his dog around the block for about 20 minutes.    No chest pain or chest pressure.    No history of sleep apnea.    He quit smoking in 2006.    Still tolerating Crestor 10 mg a day with low-dose aspirin.  Excellent cholesterol level with an LDL 45 and HDL 40 6 October 2018.    He is not taking any NSAIDs other than his low-dose aspirin.    He otherwise has been eating and drinking normally and feeling well.    He had gross hematuria December 2018.  He did have a cystoscopy and a CT scan at that time.  There was some small  nonobstructing stones noted.    Peripheral vascular disease of the aorta noted but no AAA.    He had a TURP in 2014.  They felt the bleeding was likely from regrowth of granulation tissue and bleeding.  August 2018 PSA was 2.49.    His bowels are normal.  Regular with Metamucil.  Colonoscopy January 2015 with one polyp and 5-year follow-up plan.    Past history of basal cell cancer but saw dermatology last month with negative exam in 1 year follow-up.    February 2018 he had a negative stress echo.    He saw cardiology in February 27.  No changes at that time.  Blood pressure was 112/70 at that time.    PMHx:    Past Medical History:   Diagnosis Date     Diabetes mellitus, type II (H)      High cholesterol      History of chest pain 1/2013    per pt thought to be over-exertion while shoveling snow     Hypertension      Sinus bradycardia seen on cardiac monitor 3/26/15    asystole 15 sce intervals     SSS (sick sinus syndrome) (H) 10/15/2015     Syncope     Echo nl, GXT neg, MRI head neg, US carotid neg     PSHx:    Past Surgical History:   Procedure Laterality Date     CARDIAC PACEMAKER PLACEMENT  3/26/15    [employer]     Cystoscopy Laser Cystolitholapaxy  08/26/2014     CYSTOSCOPY PROSTATE W/ LASER N/A 9/9/2014    Procedure: CYSTOSCOPY TRANSURETHRAL RESECTION PROSTATE;  Surgeon: Rocky Goyal MD;  Location: [facility] [place];  Service:      CYSTOSCOPY W/ LITHOLAPAXY / EHL N/A 8/26/2014    Procedure: HOLMIUN LASER CYSTOLITHOLAPAXY;  Surgeon: Rocky Goyal MD;  Location: [facility] [place];  Service:      GANGLION CYST EXCISION       INSERT / REPLACE / REMOVE PACEMAKER  2015     LAPAROSCOPIC CHOLECYSTECTOMY N/A 7/23/2014    Procedure: CHOLECYSTECTOMY LAPAROSCOPIC;  Surgeon: Brady Estevez MD;  Location: [facility];  Service:      Immunizations:   Immunization History   Administered Date(s) Administered     Influenza [id number], 01/20/2010     Influenza high dose, seasonal 10/15/2015, 10/11/2016,  10/28/2017, 10/26/2018     Influenza, Seasonal, Inj PF IIV3 10/07/2010, 10/11/2011, 01/10/2013     Influenza, inj, historic,unspecified 11/02/2007, 10/08/2008     Influenza,seasonal quad, PF 11/16/2013, 09/22/2014     Pneumo Conj 13-V (2010&after) 01/22/2015     Pneumo Polysac 23-V 01/20/2010     Td,adult,historic,unspecified 10/16/2006, 09/26/2007     Tdap 10/11/2016     ZOSTER, LIVE 10/07/2010       ROS A comprehensive review of systems was performed and was otherwise negative    Medications, allergies, and problem list were reviewed and updated    Exam  /60 (Patient Site: Right Arm, Patient Position: Sitting, Cuff Size: Adult Large)   Pulse 68   Resp 10   Wt 219 lb (99.3 kg)   BMI 32.11 kg/m    Patient appears well.  Lungs are clear.  Heart is regular without murmur.  No ankle edema.    Assessment/Plan  1. Essential hypertension  Still well controlled on lower dose losartan.  History of mild renal insufficiency with baseline creatinine 1.2-1.4.  Slightly above baseline with borderline lower blood pressures.    We will continue to follow his blood pressure on the current lower dose losartan.  See patient instructions below.    Follow up in June.  Continue Toprol-XL 25 mill grams a day, history of SVT, now well controlled.  Has pacemaker.  - losartan (COZAAR) 25 MG tablet; Take 1 tablet (25 mg total) by mouth daily.  Dispense: 90 tablet; Refill: 3    2. Medication monitoring encounter  Recheck creatinine.  If creatinine above 1.5, I will have him return for further evaluation of his kidney issue.  He does have a history of nephrolithiasis.  CT scan December 2018 did have IV contrast.    The elevated creatinine may have been associated with the IV contrast.  This may be his new baseline.  - Basic Metabolic Panel    3. Type 2 diabetes mellitus with complication, without long-term current use of insulin (H)  Controlled with metformin twice a day.  If creatinine gets above 1.5 May need to be reevaluated with  his metformin    4. Aortic root dilatation (H)  I did discuss this with patient today.  He will consider repeat cardiac echo in June at his follow-up appointment.  He does not have sleep apnea diagnosed.    No recurrence of the gross hematuria.  Negative workup December 2018.    1 year follow-up with dermatology in February for history of basal cell cancer.    5 your colonoscopy due January 2020.    Return in 3 months (on 6/27/2019) for Recheck.   Patient Instructions   Continue on current medications.    Goal blood pressure less than 135/85    Rechecking kidney labs today.  I will mail you the results.    Increase regular walking.  Goal for 20 minutes of walking every day.    Follow-up on June 27 for your next appointment.    We will discuss repeating your heart echo to evaluate your mild aortic root dilatation at that time.    Neil Lomeli MD        Current Outpatient Medications   Medication Sig Dispense Refill     aspirin 81 MG EC tablet Take 81 mg by mouth daily.       blood glucose test (ACCU-CHEK BILLY PLUS TEST STRP) strips Use 1 each As Directed 2 (two) times a day. Dispense brand per patient's insurance at pharmacy discretion.(E11.9) 200 strip 3     generic lancets Check sugars once daily 2 each 6     losartan (COZAAR) 25 MG tablet Take 1 tablet (25 mg total) by mouth daily. 90 tablet 3     metFORMIN (GLUCOPHAGE) 1000 MG tablet Take 1 tablet (1,000 mg total) by mouth 2 (two) times a day. 180 tablet 3     metoprolol succinate (TOPROL-XL) 50 MG 24 hr tablet Take 0.5 tablets (25 mg total) by mouth daily.       nitroglycerin (NITROSTAT) 0.4 MG SL tablet Place 0.4 mg under the tongue every 5 (five) minutes as needed for chest pain.       rosuvastatin (CRESTOR) 10 MG tablet TAKE ONE TABLET BY MOUTH AT BEDTIME  90 tablet 3     No current facility-administered medications for this visit.      No Known Allergies  Social History     Tobacco Use     Smoking status: Former Smoker     Packs/day: 25.00     Years: 1.00      Pack years: 25.00     Last attempt to quit: 2006     Years since quittin.2     Smokeless tobacco: Never Used   Substance Use Topics     Alcohol use: Yes     Alcohol/week: 1.2 oz     Types: 2 Cans of beer per week     Comment: Not in the last month or so     Drug use: No

## 2021-06-25 NOTE — PATIENT INSTRUCTIONS - HE
Continue on current medications.    Goal blood pressure less than 135/85    Rechecking kidney labs today.  I will mail you the results.    Increase regular walking.  Goal for 20 minutes of walking every day.    Follow-up on June 27 for your next appointment.    We will discuss repeating your heart echo to evaluate your mild aortic root dilatation at that time.

## 2021-06-26 ENCOUNTER — HEALTH MAINTENANCE LETTER (OUTPATIENT)
Age: 71
End: 2021-06-26

## 2021-06-26 NOTE — PROGRESS NOTES
Progress Notes by Brady Marrero MD at 4/24/2018  9:50 AM     Author: Brady Marrero MD Service: -- Author Type: Physician    Filed: 4/25/2018  1:16 PM Encounter Date: 4/24/2018 Status: Signed    : Brady Marrero MD (Physician)       [facility] Progress Note    Assessment:  1.  Sick sinus syndrome with pacemaker implantation.  Pacemaker check a few months ago did demonstrate some nonsustained ventricular tachycardia.  This prompted stress echocardiography that demonstrated no echocardiographic evidence for ischemia.  Recommendations from Dr. Cameron from EP was to initiate metoprolol.  We initiated 25 mg and try to uptitrate to 50 mg but he was noting some nonspecific symptoms we went back to 25 mg daily which he is tolerating.  He will have his pacemaker interrogated this upcoming Tuesday.  There was a comment made on the stress echocardiogram of all possible mild right ventricular enlargement and therefore a resting echocardiogram to measure right ventricular size and function will be pursued.  He reports that he snores but does not have significant daytime somnolence.  He continues to be active without symptoms.    2.  Hypertension.  Blood pressure appears to be reasonably well-controlled on current medication.  His blood pressure today is 128/68.  He brings in blood pressure records to demonstrate some variability in his blood pressure and is asked to monitor his blood pressure.    Repeat hyperlipidemia.  He is on 10 mg of rosuvastatin.  LDL cholesterol August 2016 was 48.  Will defer follow-up to Dr. Wilkes.        Plan: As outlined above with follow-up in 6 months.    1. Right ventricular enlargement  Echo Complete   2. Cardiac pacemaker in situ     3. Essential hypertension with goal blood pressure less than 130/80           An After Visit Summary was printed and given to the patient.    Subjective:    Dima Gilliland is a 67 y.o. male who returned for a planned  follow up  visit.  He reports feeling well.  He has had no specific complaints of significant dizziness.  He has had some brief episodes of lightheaded symptoms.  Pacemaker check a few months ago did demonstrate 7 episodes of nonsustained ventricular tachycardia.  He had one 11 beat [place] with 180 bpm.  I reviewed this with Dr. Cameron who recommended initiation of metoprolol.  We started 25 mg and try to uptitrate to 50 mg but he did not tolerate to 50 mg but is tolerating the 25 mg without difficulty.  Pursued an exercise stress echocardiogram that demonstrated good exercise tolerance.  Normal left ventricular function and no echocardiographic evidence for ischemia.  Right ventricle was said to be possibly mildly enlarged.    Review of Systems:   General: Weight Loss  Eyes: WNL  Ears/Nose/Throat: WNL  Lungs: WNL  Heart: WNL  Stomach: WNL  Bladder: WNL  Muscle/Joints: Muscle Pain  Skin: WNL  Nervous System: WNL  Mental Health: WNL     Blood: WNL      Problem List:    Patient Active Problem List   Diagnosis   ? Hematuria   ? Nontoxic Autonomous Thyroid Nodule   ? DM (diabetes mellitus), type 2   ? Pure hypercholesterolemia   ? Benign prostatic hyperplasia with urinary obstruction   ? Nephrolithiasis   ? Cardiac pacemaker in situ   ? Essential hypertension with goal blood pressure less than 130/80   ? Adenomatous colon polyp (01/2015)       Social History     Social History   ? Marital status:      Spouse name: N/A   ? Number of children: N/A   ? Years of education: N/A     Occupational History   ? Not on file.     Social History Main Topics   ? Smoking status: Former Smoker     Packs/day: 25.00     Years: 1.00     Quit date: 1/1/2006   ? Smokeless tobacco: Never Used   ? Alcohol use 1.2 oz/week     2 Cans of beer per week      Comment: Not in the last month or so   ? Drug use: No   ? Sexual activity: Yes     Partners: Female     Other Topics Concern   ? Not on file     Social History Narrative       Family History  "  Problem Relation Age of Onset   ? Cancer Father    ? COPD Father    ? COPD Mother    ? Heart failure Mother        Current Outpatient Prescriptions   Medication Sig Dispense Refill   ? aspirin 81 MG EC tablet Take 81 mg by mouth daily.     ? blood glucose test (ACCU-CHEK BILLY PLUS TEST STRP) strips Use 1 each As Directed 2 (two) times a day. Dispense brand per patient's insurance at pharmacy discretion.(E11.9) 200 strip 3   ? generic lancets Check sugars once daily 2 each 6   ? losartan (COZAAR) 50 MG tablet Take 1 tablet (50 mg total) by mouth daily. 90 tablet 3   ? metFORMIN (GLUCOPHAGE) 1000 MG tablet Take 1 tablet (1,000 mg total) by mouth 2 (two) times a day. 180 tablet 3   ? metoprolol succinate (TOPROL-XL) 50 MG 24 hr tablet Take 1 tablet (50 mg total) by mouth daily. Monitor blood pressure and pulse, watch for dizziness and fatigue. (Patient taking differently: Take 25 mg by mouth daily. Monitor blood pressure and pulse, watch for dizziness and fatigue.) 90 tablet 3   ? nitroglycerin (NITROSTAT) 0.4 MG SL tablet Place 0.4 mg under the tongue every 5 (five) minutes as needed for chest pain.     ? rosuvastatin (CRESTOR) 10 MG tablet TAKE ONE TABLET BY MOUTH AT BEDTIME  90 tablet 2     No current facility-administered medications for this visit.        Objective:     /68 (Patient Site: Right Arm, Patient Position: Sitting, Cuff Size: Adult Large)  Pulse 64  Resp 18  Ht 5' 9.25\" (1.759 m)  Wt 217 lb (98.4 kg)  BMI 31.81 kg/m2  217 lb (98.4 kg)       Physical Exam:    GENERAL APPEARANCE: alert, no apparent distress  HEENT: no scleral icterus or xanthelasma  NECK: jugular venous pressure within normal limits.  CHEST: symmetric, the lungs are clear to auscultation, pacemaker site well-healed  CARDIOVASCULAR: regular rhythm without murmur, click, or gallop; no carotid bruits  Abdomen: No Organomegaly, masses, bruits, or tenderness. Bowels sounds are present      EXTREMITIES: no cyanosis, clubbing or " edema    Cardiac Testing:    Patient Information      Patient Name MRN Sex              Age     Dima Gilliland Sr E [id number] Male 1950 (67 y.o.)       EKG Scan       Scan on: 18 2:57 PM by:       Indications      Abnormal electrocardiogram     Pacemaker       Interpretation Summary        The patient's exercise tolerance was normal. No exercise-induced chest pain    The stress electrocardiogram is negative for inducible ischemic EKG changes or arrhythmias    Stress echocardiogram is negative for inducible ischemia.    Left ventricle ejection fraction is normal. The estimated left ventricular ejection fraction is 55%. Right ventricle is possibly mildly enlarged. No significant valvular abnormalities       Lab Results:    Lab Results   Component Value Date     2017    K 4.7 2017     2017    CO2 21 (L) 2017    BUN 20 2017    CREATININE 1.25 2017    CALCIUM 9.9 2017     Lab Results   Component Value Date    CHOL 126 2016    TRIG 182 (H) 2016    HDL 42 2016    LDLDIRECT 52 2014     No results found for: BNP  Creatinine (mg/dL)   Date Value   2017 1.25   2017 1.37 (H)   10/11/2016 1.30   2016 1.35 (H)     LDL Calculated (mg/dL)   Date Value   2016 48   2015 58   2013 47     Lab Results   Component Value Date    WBC 7.2 2017    WBC 5.9 2015    HGB 13.9 (L) 2017    HCT 41.8 2017    MCV 85 2017     2017               This note has been dictated using voice recognition software. Any grammatical or context distortions are unintentional and inherent to the software.      Brady Marrero M.D., F.A.C.C.  [facility]  [phone]

## 2021-06-27 NOTE — PROGRESS NOTES
Progress Notes by Brady Marrero MD at 2/27/2019  8:50 AM     Author: Brady Marrero MD Service: -- Author Type: Physician    Filed: 2/27/2019  9:17 AM Encounter Date: 2/27/2019 Status: Signed    : Brady Marrero MD (Physician)       [facility] Progress Note    Assessment:  1.  Sick sinus syndrome with pacemaker implantation number of years ago.  Clinically doing well.  He has had no specific cardiovascular complaints and continues to be active.  He did have some suggestion of nonsustained ventricular tachycardia on prior pacemaker check although most recent pacemaker check suggest an episode of supraventricular tachycardia.  He had an exercise stress echocardiogram in February 2018 that was negative for ischemia.  Left ventricular systolic function on most recent echocardiogram May 2018 was reported within normal limits.  Right ventricular function was also reported within normal limits.    2.  Hypertension.  Blood pressure appears to be under good control.  Recent chemistries demonstrate an elevated creatinine.  Dr. Lomeli is planning follow-up laboratory studies next week and will comment once this is available for review.    3.  Dyslipidemia.  He is on 10 mg of rosuvastatin.  Lipids from October 2018 are excellent with an LDL cholesterol of 45 with normal liver function tests in February 2019.      Plan: As outlined above with follow-up in 6 months.    1. Pure hypercholesterolemia     2. Essential hypertension     3. Cardiac pacemaker in situ           An After Visit Summary was printed and given to the patient.    Subjective:    Dima Gilliland is a 68 y.o. male who returned for a planned  follow up visit.  I have reviewed notes from Dr. Lomeli and recent laboratory studies.  He has had a mild increase in his creatinine with plan to follow-up.  He has had no specific cardiovascular complaints.  He specifically denies significant dizziness, lightheadedness, syncope, near syncope,  chest discomfort, shortness of breath, orthopnea or PND.    He has a history of sick sinus syndrome with pacemaker implantation a number of years ago.  Last year we evaluated his pacemaker and 7 episodes of nonsustained ventricular tachycardia with an 11 beat [place].  He subsequently had a stress echocardiogram that was negative for ischemia and was started on metoprolol.  Most recent pacemaker check a few days ago demonstrated no reported significant ventricular dysrhythmia with an episode of exertional supraventricular tachycardia.  This was reported 21 beats in duration.    Review of Systems:   General: WNL  Eyes: WNL  Ears/Nose/Throat: WNL  Lungs: Snoring  Heart: WNL  Stomach: WNL  Bladder: Frequent Urination at Night  Muscle/Joints: WNL  Skin: WNL  Nervous System: Falls(Missed step on truck and landed on left knee and left side. )  Mental Health: WNL     Blood: WNL      Problem List:    Patient Active Problem List   Diagnosis   ? Nontoxic Autonomous Thyroid Nodule   ? DM (diabetes mellitus), type 2 (H)   ? Pure hypercholesterolemia   ? Benign prostatic hyperplasia with urinary obstruction   ? Nephrolithiasis   ? Cardiac pacemaker in situ   ? Essential hypertension   ? Adenomatous colon polyp (2015)   ? History of colonic polyps   ? History of basal cell cancer       Social History     Socioeconomic History   ? Marital status:      Spouse name: Not on file   ? Number of children: Not on file   ? Years of education: Not on file   ? Highest education level: Not on file   Occupational History   ? Not on file   Social Needs   ? Financial resource strain: Not on file   ? Food insecurity:     Worry: Not on file     Inability: Not on file   ? Transportation needs:     Medical: Not on file     Non-medical: Not on file   Tobacco Use   ? Smoking status: Former Smoker     Packs/day: 25.00     Years: 1.00     Pack years: 25.00     Last attempt to quit: 2006     Years since quittin.1   ? Smokeless tobacco:  Never Used   Substance and Sexual Activity   ? Alcohol use: Yes     Alcohol/week: 1.2 oz     Types: 2 Cans of beer per week     Comment: Not in the last month or so   ? Drug use: No   ? Sexual activity: Yes     Partners: Female   Lifestyle   ? Physical activity:     Days per week: Not on file     Minutes per session: Not on file   ? Stress: Not on file   Relationships   ? Social connections:     Talks on phone: Not on file     Gets together: Not on file     Attends [employer] service: Not on file     Active member of club or organization: Not on file     Attends meetings of clubs or organizations: Not on file     Relationship status: Not on file   ? Intimate partner violence:     Fear of current or ex partner: Not on file     Emotionally abused: Not on file     Physically abused: Not on file     Forced sexual activity: Not on file   Other Topics Concern   ? Not on file   Social History Narrative   ? Not on file       Family History   Problem Relation Age of Onset   ? Cancer Father    ? COPD Father    ? COPD Mother    ? Heart failure Mother        Current Outpatient Medications   Medication Sig Dispense Refill   ? aspirin 81 MG EC tablet Take 81 mg by mouth daily.     ? blood glucose test (ACCU-CHEK BILLY PLUS TEST STRP) strips Use 1 each As Directed 2 (two) times a day. Dispense brand per patient's insurance at pharmacy discretion.(E11.9) 200 strip 3   ? generic lancets Check sugars once daily 2 each 6   ? losartan (COZAAR) 50 MG tablet TAKE ONE TABLET BY MOUTH ONE TIME DAILY  90 tablet 0   ? metFORMIN (GLUCOPHAGE) 1000 MG tablet Take 1 tablet (1,000 mg total) by mouth 2 (two) times a day. 180 tablet 3   ? metoprolol succinate (TOPROL-XL) 50 MG 24 hr tablet Take 0.5 tablets (25 mg total) by mouth daily.     ? nitroglycerin (NITROSTAT) 0.4 MG SL tablet Place 0.4 mg under the tongue every 5 (five) minutes as needed for chest pain.     ? rosuvastatin (CRESTOR) 10 MG tablet TAKE ONE TABLET BY MOUTH AT BEDTIME  90 tablet  "3     No current facility-administered medications for this visit.        Objective:     /70 (Patient Site: Left Arm, Patient Position: Sitting, Cuff Size: Adult Large)   Pulse 78   Resp 18   Ht 5' 9.25\" (1.759 m)   Wt 221 lb (100.2 kg) Comment: With boots  BMI 32.40 kg/m    221 lb (100.2 kg)       Physical Exam:    GENERAL APPEARANCE: alert, no apparent distress  HEENT: no scleral icterus or xanthelasma  NECK: jugular venous pressure within normal limits.  CHEST: symmetric, the lungs are clear to auscultation, pacemaker site well-healed  CARDIOVASCULAR: regular rhythm without murmur, click, or gallop; no carotid bruits  Abdomen: No Organomegaly, masses, bruits, or tenderness. Bowels sounds are present      EXTREMITIES: no cyanosis, clubbing or edema    Cardiac Testing:  Echo Complete   Order# [id number]   Reading physician: Nichole Abbasi MD Ordering physician: Brady Marrero MD Study date: 18   Performing Date Performing Department   May 7, 2018  CARDIAC TESTING [[id number]]   Patient Information     Patient Name  Dima Gilliland Sr MRN  [id number] Sex  Male              Age  1950 (68 y.o.)   Indications     Dx: Right ventricular enlargement [I51.7 (ICD-10-CM)]   Summary       No previous study for comparison.    Left ventricle ejection fraction is mildly decreased. The calculated left ventricular ejection fraction is 52%.    Normal left ventricular size.    Normal right ventricular size and systolic function.    The aortic root is mildly dilated.        Patient Information     Patient Name  Dima Gilliland Sr MRN  [id number] Sex  Male              Age  1950 (68 y.o.)   EKG Scan       Scan on: 18 2:57 PM by:    Indications     Abnormal electrocardiogram   Pacemaker   Interpretation Summary       The patient's exercise tolerance was normal. No exercise-induced chest pain    The stress electrocardiogram is negative for inducible ischemic EKG changes or " arrhythmias    Stress echocardiogram is negative for inducible ischemia.    Left ventricle ejection fraction is normal. The estimated left ventricular ejection fraction is 55%. Right ventricle is possibly mildly enlarged. No significant valvular abnormalities              Lab Results:    Lab Results   Component Value Date     02/26/2019    K 4.7 02/26/2019     02/26/2019    CO2 22 02/26/2019    BUN 25 02/26/2019    CREATININE 1.52 (H) 02/26/2019    CALCIUM 9.5 02/26/2019     Lab Results   Component Value Date    CHOL 106 10/26/2018    TRIG 73 10/26/2018    HDL 46 10/26/2018    LDLDIRECT 49 07/18/2018     No results found for: BNP  Creatinine (mg/dL)   Date Value   02/26/2019 1.52 (H)   10/26/2018 1.36 (H)   07/18/2018 1.34 (H)   11/30/2017 1.25     LDL Calculated (mg/dL)   Date Value   10/26/2018 45   08/31/2016 48   05/28/2015 58     Lab Results   Component Value Date    WBC 6.7 10/26/2018    WBC 5.9 03/26/2015    HGB 13.0 (L) 10/26/2018    HCT 37.7 (L) 10/26/2018    MCV 85 10/26/2018     10/26/2018               This note has been dictated using voice recognition software. Any grammatical or context distortions are unintentional and inherent to the software.      Brady Marrero M.D., F.A.C.C.  [facility] [place]  [phone]

## 2021-06-28 NOTE — PROGRESS NOTES
Progress Notes by Brady Marrero MD at 2/19/2020  3:30 PM     Author: Brady Marrero MD Service: -- Author Type: Physician    Filed: 2/19/2020  4:00 PM Encounter Date: 2/19/2020 Status: Signed    : Brady Marrero MD (Physician)             [facility] Progress Note    Assessment:  1.  Sick sinus syndrome with pacemaker implantation a number of years ago.  Pacemaker function is normal based on pacemaker interrogation today without significant underlying dysrhythmia.  He will continue to monitor periodically and update me with any specific questions or concerns.  As noted he had a negative stress echocardiogram in February 2018.      2.  Mild enlargement of the proximal aorta.  We are going to plan follow-up echocardiography and compared to the echocardiogram from May 2018.  Ventricular function on that study was said to be mildly reduced to 52% with mild enlargement of the aortic root.  Repeat echocardiogram is planned.    3.  Risk factor modification.  Laboratory studies from visit with his [facility] provider February 4, 2020 finds a sodium of 139 potassium of 5.1 BUN of 20 creatinine improved at 1.35 lustral 121, HDL 47, LDL 52.    4.  Renal insufficiency.  Creatinine was higher in July 2019 and he was evaluated by urology per his report and did have kidney stones and the most recent creatinine is improved and similar to September 2019.    Plan: Echocardiogram, continued monitoring of blood pressure, follow-up 1 year    1. Aortic root enlargement (H)  Echo Complete   2. Pure hypercholesterolemia     3. Cardiac pacemaker in situ           An After Visit Summary was printed and given to the patient.    Subjective:    Dima Gilliland is a 69 y.o. male who returned for a planned  follow up visit.  He is here for follow-up of his pacemaker check and mild enlargement of the aorta.  I have reviewed recent notes from his primary care physician at [facility] including  laboratory studies.  He reports that he has been feeling well.  He has not had any specific complaints of ongoing chest discomfort, shortness of breath, dizziness or lightheadedness.  He continues to be active.  He was seen in July for some chest pressure by his primary care provider and an EKG and troponin were drawn and it was felt likely to be musculoskeletal.  He has had no significant recurrence of the symptom and has no predictable or exertional chest discomfort or change in exercise tolerance.  He did have an exercise stress echocardiogram a few years ago which was negative.    Pacemaker interrogation today demonstrates stable device function with no significant dysrhythmia.  He is atrial paced 19% of the time.    Review of Systems:   General: WNL  Eyes: WNL  Ears/Nose/Throat: WNL  Lungs: WNL  Heart: WNL  Stomach: WNL  Bladder: WNL  Muscle/Joints: WNL  Skin: WNL  Nervous System: WNL  Mental Health: WNL     Blood: WNL      Problem List:    Patient Active Problem List   Diagnosis   ? Nontoxic Autonomous Thyroid Nodule   ? DM (diabetes mellitus), type 2 (H)   ? Pure hypercholesterolemia   ? Benign prostatic hyperplasia with urinary obstruction   ? Nephrolithiasis   ? Syncope   ? Cardiac pacemaker in situ   ? Essential hypertension   ? Adenomatous colon polyp (01/2015)   ? History of colonic polyps   ? History of basal cell cancer   ? Sinus arrest       Social History     Socioeconomic History   ? Marital status:      Spouse name: Not on file   ? Number of children: Not on file   ? Years of education: Not on file   ? Highest education level: Not on file   Occupational History   ? Not on file   Social Needs   ? Financial resource strain: Not on file   ? Food insecurity:     Worry: Not on file     Inability: Not on file   ? Transportation needs:     Medical: Not on file     Non-medical: Not on file   Tobacco Use   ? Smoking status: Former Smoker     Packs/day: 25.00     Years: 1.00     Pack years: 25.00      Last attempt to quit: 2006     Years since quittin.1   ? Smokeless tobacco: Never Used   Substance and Sexual Activity   ? Alcohol use: Yes     Alcohol/week: 2.0 standard drinks     Types: 2 Cans of beer per week     Comment: Not in the last month or so   ? Drug use: No   ? Sexual activity: Yes     Partners: Female   Lifestyle   ? Physical activity:     Days per week: Not on file     Minutes per session: Not on file   ? Stress: Not on file   Relationships   ? Social connections:     Talks on phone: Not on file     Gets together: Not on file     Attends Buddhist service: Not on file     Active member of club or organization: Not on file     Attends meetings of clubs or organizations: Not on file     Relationship status: Not on file   ? Intimate partner violence:     Fear of current or ex partner: Not on file     Emotionally abused: Not on file     Physically abused: Not on file     Forced sexual activity: Not on file   Other Topics Concern   ? Not on file   Social History Narrative   ? Not on file       Family History   Problem Relation Age of Onset   ? Cancer Father    ? COPD Father    ? COPD Mother    ? Heart failure Mother        Current Outpatient Medications   Medication Sig Dispense Refill   ? ACCU-CHEK BILLY PLUS TEST STRP strips TEST TWICE DAILY 200 strip 2   ? aspirin 81 MG EC tablet Take 81 mg by mouth daily.     ? generic lancets Check sugars once daily 2 each 6   ? losartan (COZAAR) 25 MG tablet Take 1 tablet (25 mg total) by mouth daily. 90 tablet 3   ? metFORMIN (GLUCOPHAGE) 1000 MG tablet Take 1 tablet (1,000 mg total) by mouth 2 (two) times a day. 180 tablet 3   ? metoprolol succinate (TOPROL-XL) 50 MG 24 hr tablet Take 0.5 tablets (25 mg total) by mouth daily.     ? nitroglycerin (NITROSTAT) 0.4 MG SL tablet Place 0.4 mg under the tongue every 5 (five) minutes as needed for chest pain.     ? rosuvastatin (CRESTOR) 10 MG tablet Take 1 tablet (10 mg total) by mouth at bedtime. 90 tablet 3  "    No current facility-administered medications for this visit.        Objective:     /64 (Patient Site: Right Arm, Patient Position: Sitting, Cuff Size: Adult Large)   Pulse 68   Resp 16   Ht 5' 9\" (1.753 m)   Wt 218 lb (98.9 kg)   BMI 32.19 kg/m    218 lb (98.9 kg)   [unfilled]  Wt Readings from Last 3 Encounters:   20 218 lb (98.9 kg)   19 217 lb (98.4 kg)   19 215 lb (97.5 kg)       Physical Exam:    GENERAL APPEARANCE: alert, no apparent distress  HEENT: no scleral icterus or xanthelasma  NECK: jugular venous pressure within normal limits  CHEST: symmetric, the lungs are clear to auscultation pacemaker site well-healed  CARDIOVASCULAR: regular rhythm without murmur, click, or gallop; no carotid bruits  Abdomen: No Organomegaly, masses, bruits, or tenderness. Bowels sounds are present      EXTREMITIES: no cyanosis, clubbing or edema    Cardiac Testing:  Echo Complete   Order# [id number]   Reading physician: Nichole Abbasi MD Ordering physician: Brady Marrero MD Study date: 18   Performing Date Performing Department   May 7, 2018  CARDIAC TESTING [[id number]]   Patient Information     Patient Name  Dima Gilliland  DONNY MRN  [id number] Sex  Male  1  1950 (67 y.o.)   Indications     Dx: Right ventricular enlargement [I51.7 (ICD-10-CM)]   Summary       No previous study for comparison.    Left ventricle ejection fraction is mildly decreased. The calculated left ventricular ejection fraction is 52%.    Normal left ventricular size.    Normal right ventricular size and systolic function.    The aortic root is mildly dilated.        Echo Stress Exercise   Order# [id number]   Reading physician: Chu Smith MD (Ted) Ordering physician: Brady Marrero MD Study date: 18   Performing Date Performing Department   2018  CARDIAC TESTING [[id number]]   Patient Information     Patient Name  Dima Gilliland  DONNY MRN  [id number] Sex  Male  " 1  1950 (67 y.o.)   EKG Scan       Stress Test Data - Scan on 2/12/18 2:57 PM by    Indications     Abnormal electrocardiogram   Pacemaker   Interpretation Summary       The patient's exercise tolerance was normal. No exercise-induced chest pain    The stress electrocardiogram is negative for inducible ischemic EKG changes or arrhythmias    Stress echocardiogram is negative for inducible ischemia.    Left ventricle ejection fraction is normal. The estimated left ventricular ejection fraction is 55%. Right ventricle is possibly mildly enlarged. No significant valvular abnormalities         Lab Results:    Lab Results   Component Value Date     09/27/2019    K 4.7 09/27/2019     09/27/2019    CO2 26 09/27/2019    BUN 24 09/27/2019    CREATININE 1.32 (H) 09/27/2019    CALCIUM 9.4 09/27/2019     Lab Results   Component Value Date    CHOL 106 10/26/2018    TRIG 73 10/26/2018    HDL 46 10/26/2018    LDLDIRECT 49 07/18/2018     No results found for: BNP  Creatinine (mg/dL)   Date Value   09/27/2019 1.32 (H)   07/24/2019 1.75 (H)   06/27/2019 1.42 (H)   03/20/2019 1.56 (H)     LDL Calculated (mg/dL)   Date Value   10/26/2018 45   08/31/2016 48   05/28/2015 58     Lab Results   Component Value Date    WBC 9.2 07/24/2019    WBC 5.9 03/26/2015    HGB 13.3 (L) 07/24/2019    HCT 40.5 07/24/2019    MCV 84 07/24/2019     07/24/2019         This note has been dictated using voice recognition software. Any grammatical or context distortions are unintentional and inherent to the software.

## 2021-06-29 NOTE — PROGRESS NOTES
"Progress Notes by Brady Marrero MD at 4/27/2020  9:50 AM     Author: Brady Marrero MD Service: -- Author Type: Physician    Filed: 4/27/2020 10:15 AM Encounter Date: 4/27/2020 Status: Signed    : Brady Marrero MD (Physician)           The patient has been notified of following:     \"This video visit will be conducted via a call between you and your physician/provider. We have found that certain health care needs can be provided without the need for an in-person physical exam.  This service lets us provide the care you need with a video conversation.  If a prescription is necessary we can send it directly to your pharmacy.  If lab work is needed we can place an order for that and you can then stop by our lab to have the test done at a later time.      Patient has given verbal consent to a Video visit? Yes    HEART CARE VIDEO ENCOUNTER        The patient has chosen to have the visit conducted as a video visit, to reduce risk of exposure given the current status of Coronavirus in our community. This video visit is being conducted via a call between the patient and physician/provider. Health care needs are being provided without a physical exam.     Assessment/Recommendations   Assessment/Plan:  1.  Chest discomfort.  Patient recently evaluated in the emergency room for mid substernal chest discomfort which has resolved with the current GI treatment specifically Carafate with plans to switch to omeprazole per discussion with him and review of the primary care physician's notes.  He is asked to monitor for specific change in symptoms and to update us with any symptoms.  We are going to plan an exercise stress echocardiogram.  He does have risk factors for coronary artery disease and would like to further define given this recent history.    2.  Hypertension.  Blood pressure at times appears mildly elevated although during my examination in February 2020 blood pressure was reasonable.  He is going to " provide me with some blood pressure readings over the next few weeks.  I have asked him to check his blood pressure while seated for 10 minutes and vary the time of day.  We reviewed his current combination of medications.    3.  History of sick sinus syndrome.  Pacemaker implantation a number of years ago.  Pacemaker function normal.  Atrial sensed ventricular sensed with periods of atrial paced rhythm.  Brief episode of atrial tachycardia reported on the pacer monitoring.  There was one described nonsustained VT in December 2019.  He denies syncope or near syncope.    4.  Risk factor modification.  Laboratory studies from his primary care provider February 2020 are reviewed.  Creatinine mildly elevated at 1.35 with potassium of 5.1.  Will need to monitor this if his losartan is going to be increased and make additional decisions pending return blood pressure readings.  Lipids were at goal with an LDL of 52.  Follow Up Plan:  6 months  As outlined above with plans for an exercise stress echocardiogram and follow-up blood pressure report.  I have reviewed the note as documented.  This accurately captures the substance of my conversation with the patient.    Total time of video between patient and provider was 12 minutes   Start Time:940  Stop Time:952    Originating Location (pt. Location): Home    Distant Location (provider location):  [facility]    Mode of Communication:  [facility]       History of Present Illness/Subjective    Dima Gilliland is a 69 y.o. male who is being evaluated via a billable video visit and has consented to a video visit. Dima Gilliland has a history of sick sinus syndrome with a pacemaker implanted a number of years ago for symptomatic bradycardia.  We visited last February 2020.  This visit was specifically requested secondary to recent visit in the emergency room for chest discomfort.  I have reviewed the emergency room notes, laboratory  tests, EKG and have also reviewed the recent notes from his primary care provider.    He reports today that symptoms are much improved.  He indicates that with the Carafate this symptom has essentially resolved and is planning to initiate omeprazole as outlined by his primary care physician today.  He reports no exercise intolerance, no significant dizziness, palpitation, orthopnea, or PND.  He states that he has had no exertional component to the chest discomfort.  The distribution of the discomfort was mid substernal.  Symptom has been present before going to the emergency room for a few weeks.  He did not trial nitroglycerin.    He does report that his blood pressure has been running in the 140s over 70s.  He reports today his blood pressure was 144/82.  He tells me however that he is does not check his blood pressure after being seated for 10 minutes.  We talked about ideal blood pressure goal being 130/80.  Blood pressure in the emergency room was also noted to be elevated.  We talked about up titration of medications.  His preference was to get some additional blood pressure readings for me over the next few weeks and report these back to me via my chart.  During our most recent visit 2020 his blood pressure was 126/64.    Reading physician: Chu Smith MD (Ted)  Ordering physician: Brady Marrero MD  Study date: 18    Performing Date  Performing Department    2018   CARDIAC TESTING [[id number]]    Patient Information     Patient Name   Dima Gilliland Sr E  MRN   [id number]  Sex   Male   1   1950 (67 y.o.)    EKG Scan      Stress Test Data - Scan on 18 2:57 PM by     Indications     Abnormal electrocardiogram    Pacemaker    Interpretation Summary       The patient's exercise tolerance was normal. No exercise-induced chest pain    The stress electrocardiogram is negative for inducible ischemic EKG changes or arrhythmias    Stress echocardiogram is negative  for inducible ischemia.    Left ventricle ejection fraction is normal. The estimated left ventricular ejection fraction is 55%. Right ventricle is possibly mildly enlarged. No significant valvular abnormalities        Patient Information     Patient Name   Dima Gilliland Sr  MRN   [id number]  Sex   Male   1   1950 (69 y.o.)    Indications     Dx: Syncope [R55 (ICD-10-CM)]; Essential hypertension [I10 (ICD-10-CM)]; Sinus arrest [I45.5 (ICD-10-CM)]; Pure hypercholesterolemia [E78.00 (ICD-10-CM)]; Cardiac pacemaker in situ [Z95.0 (ICD-10-CM)]; Aortic root enlargement (H) [I77.89 (ICD-10-CM)]; Right ventricular enlargement [I51.7 (ICD-10-CM)]    Summary       Left ventricle ejection fraction is normal. The calculated left ventricular ejection fraction is 69%.    Normal left ventricular size and systolic function.    Normal right ventricular size and systolic function.    No hemodynamically significant valvular heart abnormalities.    When compared to the previous study dated 10/4/2018, no significant change.      Measurements     Height:  69 in        Weight:  218 lbs        BSA:  2.19 m2          HR:  64 bpm        BP:  132/68 mmHg           Technical Details     Technical Quality  Sonographer:  MAGGIE  Technical Quality: adequate visualization    Findings     Left Ventricle  Normal left ventricular size and systolic function.The calculated left ventricular ejection fraction is 69%.   This represents a normal ejection fraction. Borderline hypertrophy noted. Normal left ventricle diastolic function. Normal left atrial pressure.    Wall Scoring  Resting  Score Index: 1.00                     The left ventricular wall motion is normal.                 Right Ventricle  Normal right ventricular size and systolic function. Pacer lead is present in the ventricle.    Left Atrium  Left atrium of normal size.  There is no atrial septal defect.    Right Atrium  Right atrium of normal size.    IVC  Normal central venous  pressure.  Estimated central venous pressure equal to 3 mmHg.    Aortic Valve  Normal aortic valve structure and function.  No aortic stenosis. No aortic regurgitation is present.    Mitral Valve  Normal mitral valve structure and function.  No mitral stenosis present. No mitral regurgitation.    Tricuspid Valve  Normal tricuspid valve structure and function.  There is no evidence of tricuspid stenosis.  No tricuspid valve regurgitation.    Pulmonic Valve  The pulmonic valve was not well visualized. Normal pulmonic valve structure. There is no significant pulmonic stenosis.  Mild pulmonic regurgitation.    Thoracic Aorta  No aneurysm present.    Pericardium  No pericardial effusion.      ECG personally reviewed April 20, 2020 atrial paced rhythm intermittently, no acute ST-T segment changes, slow R wave progression.  ECG #2 unchanged from previous.  Compared to July 2019 atrial paced complexes are now observed.      I have reviewed and updated the patient's Past Medical History, Social History, Family History and Medication List.     Physical Examination performed via live video encounter Review of Systems   General Appearance:   no distress, normal body habitus, upright.   ENT/Mouth: membranes moist, no nasal discharge or bleeding gums.  Normal head shape, no evidence of injury or laceration.     EYES:  no scleral icterus, normal conjunctivae   Neck: .  Supple, normal range of motion   Chest/Lungs:   No audible wheezing equal chest wall expansion. Non labored breathing.  No cough.   Cardiovascular:      Abdomen:     Extremities:    Skin:    Neurologic: Normal arm motion bilateral, no tremors.  No evidence of focal defect.       Psychiatric: alert and oriented x3, calm                                               Medical History  Surgical History Family History Social History   Past Medical History:   Diagnosis Date   ? Diabetes mellitus, type II (H)    ? High cholesterol    ? History of chest pain 1/2013    per pt  thought to be over-exertion while shoveling snow   ? Hypertension    ? Sinus bradycardia seen on cardiac monitor 3/26/15    asystole 15 sce intervals   ? SSS (sick sinus syndrome) (H) 10/15/2015   ? Syncope     Echo nl, GXT neg, MRI head neg, US carotid neg    Past Surgical History:   Procedure Laterality Date   ? CARDIAC PACEMAKER PLACEMENT  3/26/15    [facility]   ? Cystoscopy Laser Cystolitholapaxy  2014   ? CYSTOSCOPY PROSTATE W/ LASER N/A 2014    Procedure: CYSTOSCOPY TRANSURETHRAL RESECTION PROSTATE;  Surgeon: Rocky Goyal MD;  Location: [facility];  Service:    ? CYSTOSCOPY W/ LITHOLAPAXY / EHL N/A 2014    Procedure: HOLMIUN LASER CYSTOLITHOLAPAXY;  Surgeon: Rocky Goyal MD;  Location: [facility];  Service:    ? GANGLION CYST EXCISION     ? INSERT / REPLACE / REMOVE PACEMAKER     ? LAPAROSCOPIC CHOLECYSTECTOMY N/A 2014    Procedure: CHOLECYSTECTOMY LAPAROSCOPIC;  Surgeon: Brady Estevez MD;  Location: [facility] OR;  Service:     Family History   Problem Relation Age of Onset   ? Cancer Father    ? COPD Father    ? COPD Mother    ? Heart failure Mother       Social History     Socioeconomic History   ? Marital status:      Spouse name: Not on file   ? Number of children: Not on file   ? Years of education: Not on file   ? Highest education level: Not on file   Occupational History   ? Not on file   Social Needs   ? Financial resource strain: Not on file   ? Food insecurity     Worry: Not on file     Inability: Not on file   ? Transportation needs     Medical: Not on file     Non-medical: Not on file   Tobacco Use   ? Smoking status: Former Smoker     Packs/day: 25.00     Years: 1.00     Pack years: 25.00     Last attempt to quit: 2006     Years since quittin.3   ? Smokeless tobacco: Never Used   Substance and Sexual Activity   ? Alcohol use: Yes     Alcohol/week: 2.0 standard drinks     Types: 2 Cans of beer per week     Comment:  Not in the last month or so   ? Drug use: No   ? Sexual activity: Yes     Partners: Female   Lifestyle   ? Physical activity     Days per week: Not on file     Minutes per session: Not on file   ? Stress: Not on file   Relationships   ? Social connections     Talks on phone: Not on file     Gets together: Not on file     Attends Taoism service: Not on file     Active member of club or organization: Not on file     Attends meetings of clubs or organizations: Not on file     Relationship status: Not on file   ? Intimate partner violence     Fear of current or ex partner: Not on file     Emotionally abused: Not on file     Physically abused: Not on file     Forced sexual activity: Not on file   Other Topics Concern   ? Not on file   Social History Narrative   ? Not on file          Medications  Allergies   Current Outpatient Medications   Medication Sig Dispense Refill   ? ACCU-CHEK BILLY PLUS TEST STRP strips TEST TWICE DAILY 200 strip 2   ? aspirin 81 MG EC tablet Take 81 mg by mouth daily.     ? generic lancets Check sugars once daily 2 each 6   ? losartan (COZAAR) 25 MG tablet Take 1 tablet (25 mg total) by mouth daily. 90 tablet 3   ? metFORMIN (GLUCOPHAGE) 1000 MG tablet Take 1 tablet (1,000 mg total) by mouth 2 (two) times a day. 180 tablet 3   ? metoprolol succinate (TOPROL-XL) 50 MG 24 hr tablet Take 0.5 tablets (25 mg total) by mouth daily.     ? omeprazole (PRILOSEC) 20 MG capsule Take 20 mg by mouth daily before breakfast.     ? rosuvastatin (CRESTOR) 10 MG tablet Take 1 tablet (10 mg total) by mouth at bedtime. 90 tablet 3   ? sucralfate (CARAFATE) 1 gram tablet Take 1 tablet (1 g total) by mouth 4 (four) times a day. 28 tablet 0   ? nitroglycerin (NITROSTAT) 0.4 MG SL tablet Place 0.4 mg under the tongue every 5 (five) minutes as needed for chest pain.       No current facility-administered medications for this visit.     No Known Allergies      Lab Results    Chemistry/lipid CBC Cardiac  Enzymes/BNP/TSH/INR   Lab Results   Component Value Date    CHOL 106 10/26/2018    HDL 46 10/26/2018    LDLCALC 45 10/26/2018    TRIG 73 10/26/2018    CREATININE 1.38 (H) 04/20/2020    BUN 20 04/20/2020    K 4.9 04/20/2020     04/20/2020     04/20/2020    CO2 22 04/20/2020    Lab Results   Component Value Date    WBC 7.1 04/20/2020    HGB 12.9 (L) 04/20/2020    HCT 39.4 (L) 04/20/2020    MCV 86 04/20/2020     04/20/2020    Lab Results   Component Value Date    TROPONINI <0.01 04/20/2020    BNP 19 04/20/2020    TSH 0.77 03/26/2015    INR 1.07 09/09/2014        Brady Marrero                                        20-May-2019 11:27

## 2021-06-30 NOTE — PROGRESS NOTES
Progress Notes by Brady Marrero MD at 12/21/2020  8:10 AM     Author: Brady Marrero MD Service: -- Author Type: Physician    Filed: 12/21/2020  8:44 AM Encounter Date: 12/21/2020 Status: Signed    : Brady Marrero MD (Physician)             [facility] Progress Note    Assessment:  1.  Epigastric discomfort over the weekend after eating egg roll which has since improved.  I suspect GI in etiology.  EKG from this morning is pending and will be reviewed as well as a troponin.  He is asked to monitor for any increasing or recurrent symptoms.  Negative stress echocardiogram July 2020.  He does have risk factors for coronary artery disease.    2.  Hypertension.  Blood pressure appears to be under good control and he will continue to monitor and take his current combination of medications.  This includes losartan and metoprolol.    3.  Dyslipidemia.  Lipids from his primary care provider February 2020 finds a total cholesterol of 121, triglycerides of 109 with an LDL of 52.  He is followed by his primary care provider for diabetes.  With the most recent hemoglobin A1c of 6.5.      Plan: Outlined above with plan follow-up in 6 to 8 months.  ECG reviewed demonstrates sinus rhythm, mildly slow R wave progression otherwise no acute changes.  EKG compared to April 2020 to prior EKG with atrial paced rhythm.    1. Atypical chest pain  ECG Clinic - Today    Troponin I    nitroglycerin (NITROSTAT) 0.4 MG SL tablet   2. Cardiac pacemaker in situ     3. Essential hypertension     4. Pure hypercholesterolemia           An After Visit Summary was printed and given to the patient.    Subjective:    Dima Gilliland is a 70 y.o. male who returned for a planned  follow up visit.  He tells me over the weekend he ate an egg roll and had some persistent discomfort in his upper epigastrium into his chest with a belching sensation and sensation of heartburn but this persisted for a few days and has  since resolved.  He does not report any exertional chest discomfort or shortness of breath and states that he is now feeling better.  He states that the symptom began soon after eating the egg roll.  He does have a history of reflux.  There is no radiation of the discomfort.  He perhaps felt a little bit short of breath.    He did undergo stress testing for atypical chest pain in July.  The stress test was said to be normal with a normal LV function at rest and appropriate augmentation of systolic function post exercise without EKG changes for ischemia.  We talked about avoiding foods that may cause worsening indigestion.  I talked about monitoring for progressive chest discomfort symptoms.  We are planning an EKG as well as a troponin for evaluation today.  Brady Marrero MD on 2020 13:59   Echo Stress Exercise  Order# [id number]  Reading physician: Bassem Hoffmann MD Ordering physician: Brady Marrero MD Study date: 20   Performing Date Performing Department   2020  CARDIAC TESTING [[id number]]   Patient Information    Patient Name   Dima Gilliland Sr E MRN   [id number] Sex   Male  1   1950 (70 y.o.)   EKG Scan     Stress Test Data - Scan on 20 2:26 PM by    Indications    Chest discomfort   Interpretation Summary    1. Normal stress echocardiogram without evidence of stress induced ischemia.   2. Normal resting LV systolic performance with an ejection fraction of 55-60%. There is normal improvement in left ventricular systolic performance with a peak ejection fraction of 70-75%.  3. No ECG evidence of ischemia.  4. No anginal chest pain reported with exercise.  5. Good functional capacity for age.     Type: routine pacemaker remote transmission.  Presenting rhythm: sinus and AP-VS 65 bpm.  Battery/lead status: stable.  Arrhythmias: since 20; 4 mode switch episodes all <2min, appears to be atrial tachy arrhythmia. No ventricular arrhythmias detected.  Comments:  Normal magnet and pacemaker function. ARTHUR    Review of Systems:   General: WNL  Eyes: WNL  Ears/Nose/Throat: WNL  Lungs: WNL  Heart: Arm Pain  Stomach: WNL  Bladder: WNL  Muscle/Joints: Joint Pain  Skin: WNL  Nervous System: WNL  Mental Health: WNL     Blood: Easy Bruising      Problem List:    Patient Active Problem List   Diagnosis   ? Nontoxic Autonomous Thyroid Nodule   ? DM (diabetes mellitus), type 2 (H)   ? Pure hypercholesterolemia   ? Benign prostatic hyperplasia with urinary obstruction   ? Nephrolithiasis   ? Syncope   ? Cardiac pacemaker in situ   ? Essential hypertension   ? Adenomatous colon polyp (2015)   ? History of colonic polyps   ? History of basal cell cancer   ? Sinus arrest       Social History     Socioeconomic History   ? Marital status:      Spouse name: Not on file   ? Number of children: Not on file   ? Years of education: Not on file   ? Highest education level: Not on file   Occupational History   ? Not on file   Social Needs   ? Financial resource strain: Not on file   ? Food insecurity     Worry: Not on file     Inability: Not on file   ? Transportation needs     Medical: Not on file     Non-medical: Not on file   Tobacco Use   ? Smoking status: Former Smoker     Packs/day: 25.00     Years: 1.00     Pack years: 25.00     Quit date: 2006     Years since quittin.9   ? Smokeless tobacco: Never Used   Substance and Sexual Activity   ? Alcohol use: Yes     Alcohol/week: 2.0 standard drinks     Types: 2 Cans of beer per week     Comment: Not in the last month or so   ? Drug use: No   ? Sexual activity: Yes     Partners: Female   Lifestyle   ? Physical activity     Days per week: Not on file     Minutes per session: Not on file   ? Stress: Not on file   Relationships   ? Social connections     Talks on phone: Not on file     Gets together: Not on file     Attends Taoist service: Not on file     Active member of club or organization: Not on file     Attends meetings of  "clubs or organizations: Not on file     Relationship status: Not on file   ? Intimate partner violence     Fear of current or ex partner: Not on file     Emotionally abused: Not on file     Physically abused: Not on file     Forced sexual activity: Not on file   Other Topics Concern   ? Not on file   Social History Narrative   ? Not on file       Family History   Problem Relation Age of Onset   ? Cancer Father    ? COPD Father    ? COPD Mother    ? Heart failure Mother        Current Outpatient Medications   Medication Sig Dispense Refill   ? ACCU-CHEK BILLY PLUS TEST STRP strips TEST TWICE DAILY 200 strip 2   ? aspirin 81 MG EC tablet Take 81 mg by mouth daily.     ? generic lancets Check sugars once daily 2 each 6   ? losartan (COZAAR) 25 MG tablet Take 1 tablet (25 mg total) by mouth daily. 90 tablet 3   ? metFORMIN (GLUCOPHAGE) 1000 MG tablet Take 1 tablet (1,000 mg total) by mouth 2 (two) times a day. 180 tablet 3   ? metoprolol succinate (TOPROL-XL) 50 MG 24 hr tablet Take 0.5 tablets (25 mg total) by mouth daily. 45 tablet 1   ? nitroglycerin (NITROSTAT) 0.4 MG SL tablet Place 1 tablet (0.4 mg total) under the tongue every 5 (five) minutes as needed for chest pain. 25 tablet 1   ? omeprazole (PRILOSEC) 20 MG capsule Take 20 mg by mouth daily before breakfast.     ? rosuvastatin (CRESTOR) 10 MG tablet Take 1 tablet (10 mg total) by mouth at bedtime. 90 tablet 3     No current facility-administered medications for this visit.        Objective:     /80 (Patient Site: Left Arm, Patient Position: Sitting, Cuff Size: Adult Large)   Pulse 68   Resp 14   Ht 5' 9\" (1.753 m)   Wt 212 lb (96.2 kg)   BMI 31.31 kg/m    212 lb (96.2 kg)   [unfilled]  Wt Readings from Last 3 Encounters:   12/21/20 212 lb (96.2 kg)   04/27/20 217 lb (98.4 kg)   04/20/20 217 lb (98.4 kg)       Physical Exam:    GENERAL APPEARANCE: alert, no apparent distress  HEENT: no scleral icterus or xanthelasma  NECK: jugular venous " pressure within normal limits  CHEST: symmetric, the lungs are clear to auscultation, pacemaker site well-healed  CARDIOVASCULAR: regular rhythm without murmur, click, or gallop; no carotid bruits  Abdomen: No Organomegaly, masses, bruits, or tenderness. Bowels sounds are present      EXTREMITIES: no cyanosis, clubbing or edema, palpable pulses in the dorsalis pedis and posterior tibial distribution.    Cardiac Testing:  Brady Marrero MD on 2020 13:59   Echo Stress Exercise  Order# [id number]  Reading physician: Bassem Hoffmann MD Ordering physician: Brady Marrero MD Study date: 20   Performing Date Performing Department   2020  CARDIAC TESTING [[id number]]   Patient Information    Patient Name   Dima Gilliland Sr MRN   [id number] Sex   Male  1   1950 (70 y.o.)   EKG Scan     Stress Test Data - Scan on 20 2:26 PM by    Indications    Chest discomfort   Interpretation Summary    6. Normal stress echocardiogram without evidence of stress induced ischemia.   7. Normal resting LV systolic performance with an ejection fraction of 55-60%. There is normal improvement in left ventricular systolic performance with a peak ejection fraction of 70-75%.  8. No ECG evidence of ischemia.  9. No anginal chest pain reported with exercise.  10. Good functional capacity for age.         Lab Results:    Lab Results   Component Value Date     2020    K 4.9 2020     2020    CO2 22 2020    BUN 20 2020    CREATININE 1.38 (H) 2020    CALCIUM 8.9 2020     Lab Results   Component Value Date    CHOL 106 10/26/2018    TRIG 73 10/26/2018    HDL 46 10/26/2018    LDLDIRECT 49 2018     BNP (pg/mL)   Date Value   2020 19     Creatinine (mg/dL)   Date Value   2020 1.38 (H)   2019 1.32 (H)   2019 1.75 (H)   2019 1.42 (H)     LDL Calculated (mg/dL)   Date Value   10/26/2018 45   2016 48   2015 58     Lab  Results   Component Value Date    WBC 7.1 04/20/2020    WBC 5.9 03/26/2015    HGB 12.9 (L) 04/20/2020    HCT 39.4 (L) 04/20/2020    MCV 86 04/20/2020     04/20/2020         This note has been dictated using voice recognition software. Any grammatical or context distortions are unintentional and inherent to the software.

## 2021-08-24 ENCOUNTER — ANCILLARY PROCEDURE (OUTPATIENT)
Dept: CARDIOLOGY | Facility: CLINIC | Age: 71
End: 2021-08-24
Attending: INTERNAL MEDICINE
Payer: COMMERCIAL

## 2021-08-24 DIAGNOSIS — Z95.0 CARDIAC PACEMAKER IN SITU: Primary | ICD-10-CM

## 2021-08-24 LAB
MDC_IDC_EPISODE_DTM: NORMAL
MDC_IDC_EPISODE_DURATION: 2 S
MDC_IDC_EPISODE_ID: NORMAL
MDC_IDC_EPISODE_TYPE: NORMAL
MDC_IDC_LEAD_IMPLANT_DT: NORMAL
MDC_IDC_LEAD_IMPLANT_DT: NORMAL
MDC_IDC_LEAD_LOCATION: NORMAL
MDC_IDC_LEAD_LOCATION: NORMAL
MDC_IDC_LEAD_LOCATION_DETAIL_1: NORMAL
MDC_IDC_LEAD_LOCATION_DETAIL_1: NORMAL
MDC_IDC_LEAD_MFG: NORMAL
MDC_IDC_LEAD_MFG: NORMAL
MDC_IDC_LEAD_MODEL: NORMAL
MDC_IDC_LEAD_MODEL: NORMAL
MDC_IDC_LEAD_POLARITY_TYPE: NORMAL
MDC_IDC_LEAD_POLARITY_TYPE: NORMAL
MDC_IDC_LEAD_SERIAL: NORMAL
MDC_IDC_LEAD_SERIAL: NORMAL
MDC_IDC_MSMT_BATTERY_DTM: NORMAL
MDC_IDC_MSMT_BATTERY_REMAINING_LONGEVITY: 48 MO
MDC_IDC_MSMT_BATTERY_REMAINING_PERCENTAGE: 73 %
MDC_IDC_MSMT_BATTERY_STATUS: NORMAL
MDC_IDC_MSMT_LEADCHNL_RA_IMPEDANCE_VALUE: 495 OHM
MDC_IDC_MSMT_LEADCHNL_RA_PACING_THRESHOLD_AMPLITUDE: 0.6 V
MDC_IDC_MSMT_LEADCHNL_RA_PACING_THRESHOLD_PULSEWIDTH: 0.5 MS
MDC_IDC_MSMT_LEADCHNL_RV_IMPEDANCE_VALUE: 630 OHM
MDC_IDC_MSMT_LEADCHNL_RV_PACING_THRESHOLD_AMPLITUDE: 0.7 V
MDC_IDC_MSMT_LEADCHNL_RV_PACING_THRESHOLD_PULSEWIDTH: 0.4 MS
MDC_IDC_PG_IMPLANT_DTM: NORMAL
MDC_IDC_PG_MFG: NORMAL
MDC_IDC_PG_MODEL: NORMAL
MDC_IDC_PG_SERIAL: NORMAL
MDC_IDC_PG_TYPE: NORMAL
MDC_IDC_SESS_CLINIC_NAME: NORMAL
MDC_IDC_SESS_DTM: NORMAL
MDC_IDC_SESS_TYPE: NORMAL
MDC_IDC_SET_BRADY_AT_MODE_SWITCH_MODE: NORMAL
MDC_IDC_SET_BRADY_AT_MODE_SWITCH_RATE: 150 {BEATS}/MIN
MDC_IDC_SET_BRADY_LOWRATE: 60 {BEATS}/MIN
MDC_IDC_SET_BRADY_MAX_SENSOR_RATE: 130 {BEATS}/MIN
MDC_IDC_SET_BRADY_MAX_TRACKING_RATE: 130 {BEATS}/MIN
MDC_IDC_SET_BRADY_MODE: NORMAL
MDC_IDC_SET_BRADY_PAV_DELAY_HIGH: 200 MS
MDC_IDC_SET_BRADY_PAV_DELAY_LOW: 250 MS
MDC_IDC_SET_BRADY_SAV_DELAY_HIGH: 200 MS
MDC_IDC_SET_BRADY_SAV_DELAY_LOW: 250 MS
MDC_IDC_SET_LEADCHNL_RA_PACING_AMPLITUDE: 1.5 V
MDC_IDC_SET_LEADCHNL_RA_PACING_POLARITY: NORMAL
MDC_IDC_SET_LEADCHNL_RA_PACING_PULSEWIDTH: 0.5 MS
MDC_IDC_SET_LEADCHNL_RA_SENSING_ADAPTATION_MODE: NORMAL
MDC_IDC_SET_LEADCHNL_RA_SENSING_POLARITY: NORMAL
MDC_IDC_SET_LEADCHNL_RA_SENSING_SENSITIVITY: 0.25 MV
MDC_IDC_SET_LEADCHNL_RV_PACING_AMPLITUDE: 1.2 V
MDC_IDC_SET_LEADCHNL_RV_PACING_CAPTURE_MODE: NORMAL
MDC_IDC_SET_LEADCHNL_RV_PACING_POLARITY: NORMAL
MDC_IDC_SET_LEADCHNL_RV_PACING_PULSEWIDTH: 0.4 MS
MDC_IDC_SET_LEADCHNL_RV_SENSING_ADAPTATION_MODE: NORMAL
MDC_IDC_SET_LEADCHNL_RV_SENSING_POLARITY: NORMAL
MDC_IDC_SET_LEADCHNL_RV_SENSING_SENSITIVITY: 0.6 MV
MDC_IDC_SET_ZONE_DETECTION_INTERVAL: 333 MS
MDC_IDC_SET_ZONE_TYPE: NORMAL
MDC_IDC_SET_ZONE_VENDOR_TYPE: NORMAL
MDC_IDC_STAT_AT_BURDEN_PERCENT: 1 %
MDC_IDC_STAT_AT_DTM_END: NORMAL
MDC_IDC_STAT_AT_DTM_START: NORMAL
MDC_IDC_STAT_BRADY_DTM_END: NORMAL
MDC_IDC_STAT_BRADY_DTM_START: NORMAL
MDC_IDC_STAT_BRADY_RA_PERCENT_PACED: 22 %
MDC_IDC_STAT_BRADY_RV_PERCENT_PACED: 0 %
MDC_IDC_STAT_EPISODE_RECENT_COUNT: 0
MDC_IDC_STAT_EPISODE_RECENT_COUNT: 1
MDC_IDC_STAT_EPISODE_RECENT_COUNT_DTM_END: NORMAL
MDC_IDC_STAT_EPISODE_RECENT_COUNT_DTM_START: NORMAL
MDC_IDC_STAT_EPISODE_TYPE: NORMAL
MDC_IDC_STAT_EPISODE_VENDOR_TYPE: NORMAL

## 2021-08-24 PROCEDURE — 93294 REM INTERROG EVL PM/LDLS PM: CPT | Performed by: INTERNAL MEDICINE

## 2021-08-24 PROCEDURE — 93296 REM INTERROG EVL PM/IDS: CPT | Performed by: INTERNAL MEDICINE

## 2021-10-11 ENCOUNTER — HEALTH MAINTENANCE LETTER (OUTPATIENT)
Age: 71
End: 2021-10-11

## 2021-12-02 ENCOUNTER — TELEPHONE (OUTPATIENT)
Dept: CARDIOLOGY | Facility: CLINIC | Age: 71
End: 2021-12-02
Payer: COMMERCIAL

## 2021-12-02 ENCOUNTER — ANCILLARY PROCEDURE (OUTPATIENT)
Dept: CARDIOLOGY | Facility: CLINIC | Age: 71
End: 2021-12-02
Attending: INTERNAL MEDICINE
Payer: COMMERCIAL

## 2021-12-02 DIAGNOSIS — I10 ESSENTIAL HYPERTENSION: Primary | ICD-10-CM

## 2021-12-02 DIAGNOSIS — I49.5 SICK SINUS SYNDROME (H): ICD-10-CM

## 2021-12-02 DIAGNOSIS — E78.00 PURE HYPERCHOLESTEROLEMIA: ICD-10-CM

## 2021-12-02 DIAGNOSIS — Z95.0 PACEMAKER: ICD-10-CM

## 2021-12-02 PROCEDURE — 93294 REM INTERROG EVL PM/LDLS PM: CPT | Performed by: INTERNAL MEDICINE

## 2021-12-02 PROCEDURE — 93296 REM INTERROG EVL PM/IDS: CPT | Performed by: INTERNAL MEDICINE

## 2021-12-02 NOTE — TELEPHONE ENCOUNTER
Type: routine remote pacemaker transmission.   Presenting rhythm: sinus 60's.  Battery/lead status: stable.  Arrhythmias: since 8/24/21; one mode switch episode <1min, appears to be atrial tachy arrhythmia. One ventricular high rate episode, appears to be NSVT 10bts 185 bpm. 3/5/2020 EF 69% by Echo.  Comments: Normal magnet and pacemaker function. Routed to device RN for review. SHAWANDA Del Cid, Device Specialist.        Transmission reviewed, agree with above. 1 10bt- NSVT on 11/6/21 at ~5 AM. Reviewed with kamar, states probably asleep at that time. Denies any troublesome symptoms recently. Confirms he is on Toprol XL 25 mg daily. States he was out of it for a while because he couldn;t get the prescription refilled for some unknown reason with our clinic so he had his PMD refill for now and is back on it. Apologized for the delay. Advised to call with any near-syncope/lightheadedness or rapid palpitations. Patient verbalized understanding.     Pebbles Combs RN

## 2021-12-02 NOTE — TELEPHONE ENCOUNTER
Do we know when the nonsustained VT occurred. Do we know if it correlates as to when he was not taking the metoprolol? Rate is anybody taking over for this investigation of why medicine refills or not taking place. The champion was Prabha before she left and I think this is still a very important issue. I did copy Annetta as well  Brady Marrero MD

## 2021-12-02 NOTE — PROGRESS NOTES
Fill was sent 3/1/21 - 45 tabs with one refill, so should have had enough for six months, or until 9/1/21.  I don't see med requested otherwise.  -shereen

## 2021-12-03 NOTE — TELEPHONE ENCOUNTER
If feeling okay lets keep him on the metoprolol and monitor for reoccurrence.  He had a normal stress echocardiogram July 2020.  If having any symptoms of chest pain or shortness of breath or dizziness please let me know.  Thank you    Brady Marrero MD

## 2021-12-06 NOTE — PROGRESS NOTES
Recommendations discussed with pt.  Pt verbalized understanding and had no questions.  PCP sent enough metoprolol for 1 year.  Follow-up order entered, message sent to scheduling to reach out as pt due for visit with Dr Clarice keating

## 2021-12-08 LAB
MDC_IDC_EPISODE_DTM: NORMAL
MDC_IDC_EPISODE_DURATION: 10 S
MDC_IDC_EPISODE_DURATION: 2 S
MDC_IDC_EPISODE_ID: NORMAL
MDC_IDC_EPISODE_TYPE: NORMAL
MDC_IDC_LEAD_IMPLANT_DT: NORMAL
MDC_IDC_LEAD_IMPLANT_DT: NORMAL
MDC_IDC_LEAD_LOCATION: NORMAL
MDC_IDC_LEAD_LOCATION: NORMAL
MDC_IDC_LEAD_LOCATION_DETAIL_1: NORMAL
MDC_IDC_LEAD_LOCATION_DETAIL_1: NORMAL
MDC_IDC_LEAD_MFG: NORMAL
MDC_IDC_LEAD_MFG: NORMAL
MDC_IDC_LEAD_MODEL: NORMAL
MDC_IDC_LEAD_MODEL: NORMAL
MDC_IDC_LEAD_POLARITY_TYPE: NORMAL
MDC_IDC_LEAD_POLARITY_TYPE: NORMAL
MDC_IDC_LEAD_SERIAL: NORMAL
MDC_IDC_LEAD_SERIAL: NORMAL
MDC_IDC_MSMT_BATTERY_DTM: NORMAL
MDC_IDC_MSMT_BATTERY_REMAINING_LONGEVITY: 42 MO
MDC_IDC_MSMT_BATTERY_REMAINING_PERCENTAGE: 70 %
MDC_IDC_MSMT_BATTERY_STATUS: NORMAL
MDC_IDC_MSMT_LEADCHNL_RA_IMPEDANCE_VALUE: 493 OHM
MDC_IDC_MSMT_LEADCHNL_RA_PACING_THRESHOLD_AMPLITUDE: 0.6 V
MDC_IDC_MSMT_LEADCHNL_RA_PACING_THRESHOLD_PULSEWIDTH: 0.5 MS
MDC_IDC_MSMT_LEADCHNL_RV_IMPEDANCE_VALUE: 659 OHM
MDC_IDC_MSMT_LEADCHNL_RV_PACING_THRESHOLD_AMPLITUDE: 0.5 V
MDC_IDC_MSMT_LEADCHNL_RV_PACING_THRESHOLD_PULSEWIDTH: 0.4 MS
MDC_IDC_PG_IMPLANT_DTM: NORMAL
MDC_IDC_PG_MFG: NORMAL
MDC_IDC_PG_MODEL: NORMAL
MDC_IDC_PG_SERIAL: NORMAL
MDC_IDC_PG_TYPE: NORMAL
MDC_IDC_SESS_CLINIC_NAME: NORMAL
MDC_IDC_SESS_DTM: NORMAL
MDC_IDC_SESS_TYPE: NORMAL
MDC_IDC_SET_BRADY_AT_MODE_SWITCH_MODE: NORMAL
MDC_IDC_SET_BRADY_AT_MODE_SWITCH_RATE: 150 {BEATS}/MIN
MDC_IDC_SET_BRADY_LOWRATE: 60 {BEATS}/MIN
MDC_IDC_SET_BRADY_MAX_SENSOR_RATE: 130 {BEATS}/MIN
MDC_IDC_SET_BRADY_MAX_TRACKING_RATE: 130 {BEATS}/MIN
MDC_IDC_SET_BRADY_MODE: NORMAL
MDC_IDC_SET_BRADY_PAV_DELAY_HIGH: 200 MS
MDC_IDC_SET_BRADY_PAV_DELAY_LOW: 250 MS
MDC_IDC_SET_BRADY_SAV_DELAY_HIGH: 200 MS
MDC_IDC_SET_BRADY_SAV_DELAY_LOW: 250 MS
MDC_IDC_SET_LEADCHNL_RA_PACING_AMPLITUDE: 1.5 V
MDC_IDC_SET_LEADCHNL_RA_PACING_POLARITY: NORMAL
MDC_IDC_SET_LEADCHNL_RA_PACING_PULSEWIDTH: 0.5 MS
MDC_IDC_SET_LEADCHNL_RA_SENSING_ADAPTATION_MODE: NORMAL
MDC_IDC_SET_LEADCHNL_RA_SENSING_POLARITY: NORMAL
MDC_IDC_SET_LEADCHNL_RA_SENSING_SENSITIVITY: 0.25 MV
MDC_IDC_SET_LEADCHNL_RV_PACING_AMPLITUDE: 1 V
MDC_IDC_SET_LEADCHNL_RV_PACING_CAPTURE_MODE: NORMAL
MDC_IDC_SET_LEADCHNL_RV_PACING_POLARITY: NORMAL
MDC_IDC_SET_LEADCHNL_RV_PACING_PULSEWIDTH: 0.4 MS
MDC_IDC_SET_LEADCHNL_RV_SENSING_ADAPTATION_MODE: NORMAL
MDC_IDC_SET_LEADCHNL_RV_SENSING_POLARITY: NORMAL
MDC_IDC_SET_LEADCHNL_RV_SENSING_SENSITIVITY: 0.6 MV
MDC_IDC_SET_ZONE_DETECTION_INTERVAL: 333 MS
MDC_IDC_SET_ZONE_TYPE: NORMAL
MDC_IDC_SET_ZONE_VENDOR_TYPE: NORMAL
MDC_IDC_STAT_AT_BURDEN_PERCENT: 1 %
MDC_IDC_STAT_AT_DTM_END: NORMAL
MDC_IDC_STAT_AT_DTM_START: NORMAL
MDC_IDC_STAT_BRADY_DTM_END: NORMAL
MDC_IDC_STAT_BRADY_DTM_START: NORMAL
MDC_IDC_STAT_BRADY_RA_PERCENT_PACED: 21 %
MDC_IDC_STAT_BRADY_RV_PERCENT_PACED: 0 %
MDC_IDC_STAT_EPISODE_RECENT_COUNT: 0
MDC_IDC_STAT_EPISODE_RECENT_COUNT: 0
MDC_IDC_STAT_EPISODE_RECENT_COUNT: 1
MDC_IDC_STAT_EPISODE_RECENT_COUNT: 1
MDC_IDC_STAT_EPISODE_RECENT_COUNT_DTM_END: NORMAL
MDC_IDC_STAT_EPISODE_RECENT_COUNT_DTM_START: NORMAL
MDC_IDC_STAT_EPISODE_TYPE: NORMAL
MDC_IDC_STAT_EPISODE_VENDOR_TYPE: NORMAL

## 2021-12-17 ENCOUNTER — TRANSCRIBE ORDERS (OUTPATIENT)
Dept: CARDIOLOGY | Facility: CLINIC | Age: 71
End: 2021-12-17
Payer: COMMERCIAL

## 2021-12-17 DIAGNOSIS — Z95.0 CARDIAC PACEMAKER IN SITU: Primary | ICD-10-CM

## 2022-01-30 ENCOUNTER — HEALTH MAINTENANCE LETTER (OUTPATIENT)
Age: 72
End: 2022-01-30

## 2022-02-04 ENCOUNTER — ANCILLARY PROCEDURE (OUTPATIENT)
Dept: CARDIOLOGY | Facility: CLINIC | Age: 72
End: 2022-02-04
Attending: INTERNAL MEDICINE
Payer: COMMERCIAL

## 2022-02-04 VITALS
HEIGHT: 70 IN | RESPIRATION RATE: 16 BRPM | BODY MASS INDEX: 30.78 KG/M2 | HEART RATE: 60 BPM | WEIGHT: 215 LBS | SYSTOLIC BLOOD PRESSURE: 138 MMHG | DIASTOLIC BLOOD PRESSURE: 84 MMHG

## 2022-02-04 DIAGNOSIS — Z95.0 CARDIAC PACEMAKER IN SITU: ICD-10-CM

## 2022-02-04 DIAGNOSIS — I45.5 SINUS ARREST: Primary | ICD-10-CM

## 2022-02-04 DIAGNOSIS — I25.83 CORONARY ARTERY DISEASE DUE TO LIPID RICH PLAQUE: Primary | ICD-10-CM

## 2022-02-04 DIAGNOSIS — I25.10 CORONARY ARTERY DISEASE DUE TO LIPID RICH PLAQUE: Primary | ICD-10-CM

## 2022-02-04 LAB
MDC_IDC_LEAD_IMPLANT_DT: NORMAL
MDC_IDC_LEAD_IMPLANT_DT: NORMAL
MDC_IDC_LEAD_LOCATION: NORMAL
MDC_IDC_LEAD_LOCATION: NORMAL
MDC_IDC_LEAD_LOCATION_DETAIL_1: NORMAL
MDC_IDC_LEAD_LOCATION_DETAIL_1: NORMAL
MDC_IDC_LEAD_MFG: NORMAL
MDC_IDC_LEAD_MFG: NORMAL
MDC_IDC_LEAD_MODEL: NORMAL
MDC_IDC_LEAD_MODEL: NORMAL
MDC_IDC_LEAD_POLARITY_TYPE: NORMAL
MDC_IDC_LEAD_POLARITY_TYPE: NORMAL
MDC_IDC_LEAD_SERIAL: NORMAL
MDC_IDC_LEAD_SERIAL: NORMAL
MDC_IDC_MSMT_BATTERY_DTM: NORMAL
MDC_IDC_MSMT_BATTERY_REMAINING_LONGEVITY: 42 MO
MDC_IDC_MSMT_BATTERY_REMAINING_PERCENTAGE: 69 %
MDC_IDC_MSMT_BATTERY_STATUS: NORMAL
MDC_IDC_MSMT_LEADCHNL_RA_IMPEDANCE_VALUE: 515 OHM
MDC_IDC_MSMT_LEADCHNL_RA_PACING_THRESHOLD_AMPLITUDE: 0.7 V
MDC_IDC_MSMT_LEADCHNL_RA_PACING_THRESHOLD_PULSEWIDTH: 0.5 MS
MDC_IDC_MSMT_LEADCHNL_RV_IMPEDANCE_VALUE: 659 OHM
MDC_IDC_MSMT_LEADCHNL_RV_PACING_THRESHOLD_AMPLITUDE: 0.6 V
MDC_IDC_MSMT_LEADCHNL_RV_PACING_THRESHOLD_PULSEWIDTH: 0.4 MS
MDC_IDC_PG_IMPLANT_DTM: NORMAL
MDC_IDC_PG_MFG: NORMAL
MDC_IDC_PG_MODEL: NORMAL
MDC_IDC_PG_SERIAL: NORMAL
MDC_IDC_PG_TYPE: NORMAL
MDC_IDC_SESS_CLINIC_NAME: NORMAL
MDC_IDC_SESS_DTM: NORMAL
MDC_IDC_SESS_TYPE: NORMAL
MDC_IDC_SET_BRADY_AT_MODE_SWITCH_MODE: NORMAL
MDC_IDC_SET_BRADY_AT_MODE_SWITCH_RATE: 150 {BEATS}/MIN
MDC_IDC_SET_BRADY_LOWRATE: 60 {BEATS}/MIN
MDC_IDC_SET_BRADY_MAX_SENSOR_RATE: 130 {BEATS}/MIN
MDC_IDC_SET_BRADY_MAX_TRACKING_RATE: 130 {BEATS}/MIN
MDC_IDC_SET_BRADY_MODE: NORMAL
MDC_IDC_SET_BRADY_PAV_DELAY_HIGH: 200 MS
MDC_IDC_SET_BRADY_PAV_DELAY_LOW: 250 MS
MDC_IDC_SET_BRADY_SAV_DELAY_HIGH: 200 MS
MDC_IDC_SET_BRADY_SAV_DELAY_LOW: 250 MS
MDC_IDC_SET_LEADCHNL_RA_PACING_AMPLITUDE: 1.5 V
MDC_IDC_SET_LEADCHNL_RA_PACING_POLARITY: NORMAL
MDC_IDC_SET_LEADCHNL_RA_PACING_PULSEWIDTH: 0.5 MS
MDC_IDC_SET_LEADCHNL_RA_SENSING_ADAPTATION_MODE: NORMAL
MDC_IDC_SET_LEADCHNL_RA_SENSING_POLARITY: NORMAL
MDC_IDC_SET_LEADCHNL_RA_SENSING_SENSITIVITY: 0.25 MV
MDC_IDC_SET_LEADCHNL_RV_PACING_AMPLITUDE: 1.2 V
MDC_IDC_SET_LEADCHNL_RV_PACING_CAPTURE_MODE: NORMAL
MDC_IDC_SET_LEADCHNL_RV_PACING_POLARITY: NORMAL
MDC_IDC_SET_LEADCHNL_RV_PACING_PULSEWIDTH: 0.4 MS
MDC_IDC_SET_LEADCHNL_RV_SENSING_ADAPTATION_MODE: NORMAL
MDC_IDC_SET_LEADCHNL_RV_SENSING_POLARITY: NORMAL
MDC_IDC_SET_LEADCHNL_RV_SENSING_SENSITIVITY: 0.6 MV
MDC_IDC_SET_ZONE_DETECTION_INTERVAL: 333 MS
MDC_IDC_SET_ZONE_TYPE: NORMAL
MDC_IDC_SET_ZONE_VENDOR_TYPE: NORMAL
MDC_IDC_STAT_AT_BURDEN_PERCENT: 1 %
MDC_IDC_STAT_AT_DTM_END: NORMAL
MDC_IDC_STAT_AT_DTM_START: NORMAL
MDC_IDC_STAT_BRADY_DTM_END: NORMAL
MDC_IDC_STAT_BRADY_DTM_START: NORMAL
MDC_IDC_STAT_BRADY_RA_PERCENT_PACED: 21 %
MDC_IDC_STAT_BRADY_RV_PERCENT_PACED: 0 %
MDC_IDC_STAT_EPISODE_RECENT_COUNT: 0
MDC_IDC_STAT_EPISODE_RECENT_COUNT: 0
MDC_IDC_STAT_EPISODE_RECENT_COUNT: 3
MDC_IDC_STAT_EPISODE_RECENT_COUNT: 7
MDC_IDC_STAT_EPISODE_RECENT_COUNT_DTM_END: NORMAL
MDC_IDC_STAT_EPISODE_RECENT_COUNT_DTM_START: NORMAL
MDC_IDC_STAT_EPISODE_TYPE: NORMAL
MDC_IDC_STAT_EPISODE_VENDOR_TYPE: NORMAL

## 2022-02-04 PROCEDURE — 99214 OFFICE O/P EST MOD 30 MIN: CPT | Performed by: INTERNAL MEDICINE

## 2022-02-04 PROCEDURE — 93280 PM DEVICE PROGR EVAL DUAL: CPT | Performed by: INTERNAL MEDICINE

## 2022-02-04 ASSESSMENT — MIFFLIN-ST. JEOR: SCORE: 1726.48

## 2022-02-04 NOTE — PROGRESS NOTES
HEART CARE ENCOUNTER CONSULTATON NOTE      [facility]  [phone]      Assessment/Recommendations   Assessment/Plan:  1.  Sick sinus syndrome status post pacemaker implantation with normal pacemaker function based on device interrogation today.  As noted 1 episode of nonsustained ventricular tachycardia in November.  At the time that he was contacted he has been out of his metoprolol for a few days.  He does have a full refill of his metoprolol and is asked to contact us if he has any issues with respect to refilling his medications.  We talked about follow-up stress testing given this finding with a negative stress echocardiogram July 2020.  He wanted to hold on additional testing at this time but we will plan to rediscuss if we identify any other significant dysrhythmia.    2.  Hypertension.  Blood pressure appears to be under good control.  Recent laboratory studies from his primary care provider are reviewed.  He does have some mild renal insufficiency with a creatinine of 1.55 in July 2021 following with his primary care provider with normal potassium at that time.     3.  Dyslipidemia with good control of the lipids with the current dose of rosuvastatin.  Lipids in January 2021 with a total cholesterol 143, triglycerides 121, LDL of 62.    Follow-up 1 year with close monitoring of device interrogation as outlined above.       History of Present Illness/Subjective    HPI: Dima Sr HINES Darshana is a 71 year old male who is seen in follow-up.  He is here today for pacemaker interrogation.  Reports that he is feeling well.  He denies chest pain or shortness of breath palpitations or dizziness.  His pacemaker interrogation today demonstrates normal pacemaker function.  He did have documented nonsustained ventricular tachycardia event in November.  He recalls that he has run out of his metoprolol.  We talked about contacting my office visit about running out of medications.  He has had  occasional episodes of nonsustained ventricular tachycardia with a normal stress echocardiogram completed July 2020.  We talked about whether we would repeat echocardiography or stress echocardiography given this finding in November but he wanted to keep close eye and avoid additional testing at this time.  I did indicate that if he saw more frequent dysrhythmia that we would reconsider.    Recent Echocardiogram Results:  Reading Physician Reading Date Result Priority   Justin Hoffmann MD  [phone] 7/24/2020 Routine   Provider, Historical 7/24/2020      Narrative & Impression  1. Normal stress echocardiogram without evidence of stress induced ischemia.   2. Normal resting LV systolic performance with an ejection fraction of 55-60%. There is normal improvement in left ventricular systolic performance with a peak ejection fraction of 70-75%.  3. No ECG evidence of ischemia.  4. No anginal chest pain reported with exercise.  5. Good functional capacity for age.     Stress Summary: Patient exercised on the [place] protocol for a total of 9:00 minutes. Peak heart rate achieved was 136 b.p.m (91 % max.) with a double-product of 24,024. The patient stopped exercise due to generalized fatigue. No chest pain symptoms were   reported.     Electrocardiogram: Baseline ECG reveals normal sinus rhythm without evidence of prior myocardial injury. With exercise, there are no significant ECG changes to suggest ischemia (subtle nonspecific ST changes only).     Baseline echocardiogram: Technically adequate images were obtained in the standard quad-screen format. LV systolic performance is normal with a visually estimated ejection fraction of 55-60%. There is normal regional wall motion.  Mild enlargement of   the proximal ascending aorta.     Post exercise echocardiogram: Technically adequate images were obtained immediately post exercise in the standard quad-screen format. LV systolic performance normally improves with a  peak ejection fraction of 70-75%. There are no stress induced regional   wall motion abnormalities.      Recent Coronary Angiogram Results:     Information    Exam Date Exam Time Exam Date Exam Time Accession # Performing Department Results    12/2/21  4:31 AM 12/2/21  8:44 AM [id number] [facility]        Narrative & Impression    Type: routine remote pacemaker transmission.   Presenting rhythm: sinus 60's.  Battery/lead status: stable.  Arrhythmias: since 8/24/21; one mode switch episode <1min, appears to be atrial tachy arrhythmia. One ventricular high rate episode, appears to be NSVT 10bts 185 bpm. 3/5/2020 EF 69% by Echo.  Comments: Normal magnet and pacemaker function. Routed to device RN for review. SHAWANDA Del Cid, Device Specialist. ADD: NSVT noted during sleep.  See note in Epic. Routed to Dr. Clarice Tang RN      I have reviewed and interpreted the device interrogation, settings, programming, and encounter summary. The device is functioning within normal device parameters. I agree with the current findings, assessment and plan.     Contains abnormal data Echocardiogram Complete  Order: [id number]   Status: Final result     Visible to patient: Yes (not seen)     Next appt: 02/04/2022 at 09:20 AM in Cardiology ([facility] Device Nurse 1)     Dx: Pure hypercholesterolemia; Cardiac pa...     1 Result Note    Details    Reading Physician Reading Date Result Priority   Jimmie Smith MD  [phone] 3/5/2020 Routine   Provider, Historical 3/5/2020      Narrative & Impression    Left ventricle ejection fraction is normal. The calculated left ventricular ejection fraction is 69%.    Normal left ventricular size and systolic function.    Normal right ventricular size and systolic function.    No hemodynamically significant valvular heart abnormalities.    When compared to the previous study dated 10/4/2018, no significant change.          Physical Examination  Review of Systems    Vitals: 138/84, weight 215 pounds heart rate of 60 and regular  Wt Readings from Last 3 Encounters:   12/21/20 96.2 kg (212 lb)   04/27/20 98.4 kg (217 lb)   02/19/20 98.9 kg (218 lb)       General Appearance:   no distress, normal body habitus   ENT/Mouth: membranes moist, no oral lesions or bleeding gums.      EYES:  no scleral icterus, normal conjunctivae   Neck: no carotid bruits or thyromegaly   Chest/Lungs:   lungs are clear to auscultation, no rales or wheezing, pacemaker site well-healed, equal chest wall expansion    Cardiovascular:   Regular. Normal first and second heart sounds with no murmurs, rubs, or gallops; the carotid, radial and posterior tibial pulses are intact, Jugular venous pressure within normal limits, no edema bilaterally    Abdomen:  no organomegaly, masses, bruits, or tenderness; bowel sounds are present   Extremities: no cyanosis or clubbing   Skin: no xanthelasma, warm.    Neurologic: normal  bilateral, no tremors     Psychiatric: alert and oriented x3, calm        Please refer above for cardiac ROS details.        Medical History  Surgical History Family History Social History   Past Medical History:   Diagnosis Date     Diabetes mellitus, type II (H)      High cholesterol      History of chest pain 1/2013    per pt thought to be over-exertion while shoveling snow     Hypertension      Sinus bradycardia seen on cardiac monitor 3/26/15    asystole 15 sce intervals     SSS (sick sinus syndrome) (H) 10/15/2015     Syncope     Echo nl, GXT neg, MRI head neg, US carotid neg     Past Surgical History:   Procedure Laterality Date     CYSTOSCOPY PROSTATE W/ LASER N/A 9/9/2014    Procedure: CYSTOSCOPY TRANSURETHRAL RESECTION PROSTATE;  Surgeon: Rocky Goyal MD;  Location: [facility];  Service:      CYSTOSCOPY W/ LITHOLAPAXY / EHL N/A 8/26/2014    Procedure: HOLMIUN LASER CYSTOLITHOLAPAXY;  Surgeon: Rocky Goyal MD;  Location: [facility];  Service:      EXCISE  GANGLION WRIST       IMPLANT PACEMAKER  3/26/15    [facility]     INSERT / REPLACE / REMOVE PACEMAKER       LAPAROSCOPIC CHOLECYSTECTOMY N/A 2014    Procedure: CHOLECYSTECTOMY LAPAROSCOPIC;  Surgeon: Brady Estevez MD;  Location: [facility] Main OR;  Service:      OTHER SURGICAL HISTORY  2014    Cystoscopy Laser Cystolitholapaxy     Family History   Problem Relation Age of Onset     Cancer Father      Chronic Obstructive Pulmonary Disease Father      Chronic Obstructive Pulmonary Disease Mother      Heart Failure Mother         Social History     Socioeconomic History     Marital status:      Spouse name: Not on file     Number of children: Not on file     Years of education: Not on file     Highest education level: Not on file   Occupational History     Not on file   Tobacco Use     Smoking status: Former Smoker     Packs/day: 25.00     Years: 1.00     Pack years: 25.00     Quit date: 2006     Years since quittin.1     Smokeless tobacco: Never Used   Substance and Sexual Activity     Alcohol use: Yes     Alcohol/week: 2.0 standard drinks     Comment: Alcoholic Drinks/day: Not in the last month or so     Drug use: No     Sexual activity: Yes     Partners: Female   Other Topics Concern     Not on file   Social History Narrative     Not on file     Social Determinants of Health     Financial Resource Strain: Not on file   Food Insecurity: Not on file   Transportation Needs: Not on file   Physical Activity: Not on file   Stress: Not on file   Social Connections: Not on file   Intimate Partner Violence: Not on file   Housing Stability: Not on file           Medications  Allergies   Current Outpatient Medications   Medication Sig Dispense Refill     ACCU-CHEK BILLY PLUS TEST STRP strips [ACCU-CHEK BILLY PLUS TEST STRP STRIPS] TEST TWICE DAILY 200 strip 2     aspirin 81 MG EC tablet [ASPIRIN 81 MG EC TABLET] Take 81 mg by mouth daily.       generic lancets [GENERIC LANCETS] Check sugars  once daily 2 each 6     losartan (COZAAR) 25 MG tablet [LOSARTAN (COZAAR) 25 MG TABLET] Take 1 tablet (25 mg total) by mouth daily. 90 tablet 3     metFORMIN (GLUCOPHAGE) 1000 MG tablet [METFORMIN (GLUCOPHAGE) 1000 MG TABLET] Take 1 tablet (1,000 mg total) by mouth 2 (two) times a day. 180 tablet 3     metoprolol succinate (TOPROL-XL) 50 MG 24 hr tablet [METOPROLOL SUCCINATE (TOPROL-XL) 50 MG 24 HR TABLET] TAKE 1/2 TABLET BY MOUTH ONCE DAILY 45 tablet 1     nitroglycerin (NITROSTAT) 0.4 MG SL tablet [NITROGLYCERIN (NITROSTAT) 0.4 MG SL TABLET] Place 1 tablet (0.4 mg total) under the tongue every 5 (five) minutes as needed for chest pain. 25 tablet 1     omeprazole (PRILOSEC) 20 MG capsule [OMEPRAZOLE (PRILOSEC) 20 MG CAPSULE] Take 20 mg by mouth daily before breakfast.       rosuvastatin (CRESTOR) 10 MG tablet [ROSUVASTATIN (CRESTOR) 10 MG TABLET] Take 1 tablet (10 mg total) by mouth at bedtime. 90 tablet 3     No Known Allergies       Lab Results    Chemistry/lipid CBC Cardiac Enzymes/BNP/TSH/INR   Recent Labs   Lab Test 10/26/18  1037   CHOL 106   HDL 46   LDL 45   TRIG 73     Recent Labs   Lab Test 10/26/18  1037 07/18/18  1441 08/31/16  0743   LDL 45 49 48     Recent Labs   Lab Test 04/20/20  1522      POTASSIUM 4.9   CHLORIDE 107   CO2 22      BUN 20   CR 1.38*   GFRESTIMATED 51*   LEMUEL 8.9     Recent Labs   Lab Test 04/20/20  1522 09/27/19  0914 07/24/19  1620   CR 1.38* 1.32* 1.75*     Recent Labs   Lab Test 06/27/19  1433 02/26/19  0859 10/26/18  1037   A1C 6.4* 6.0 6.2*          Recent Labs   Lab Test 04/20/20  1522   WBC 7.1   HGB 12.9*   HCT 39.4*   MCV 86        Recent Labs   Lab Test 04/20/20  1522 07/24/19  1620 10/26/18  1037   HGB 12.9* 13.3* 13.0*    Recent Labs   Lab Test 12/21/20  0840 04/20/20  1522 07/24/19  1620   TROPONINI <0.01 <0.01 <0.01     Recent Labs   Lab Test 04/20/20  1522   BNP 19     No results for input(s): TSH in the last 92142 hours.  No results for input(s):  INR in the last 28788 hours.     Brady Marrero MD

## 2022-02-04 NOTE — PATIENT INSTRUCTIONS
Please let me know if you have any heart symptoms and call my nurse with any medication concerns.My nurse is Estee and her number is [phone]

## 2022-02-04 NOTE — LETTER
2/4/2022    Benny Li MD  [facility] [address] [place] [address]    RE: Dima Gilliland       Dear Colleague,     I had the pleasure of seeing Dima Gilliland in the [facility].    HEART CARE ENCOUNTER CONSULTATON NOTE      [facility]  [phone]      Assessment/Recommendations   Assessment/Plan:  1.  Sick sinus syndrome status post pacemaker implantation with normal pacemaker function based on device interrogation today.  As noted 1 episode of nonsustained ventricular tachycardia in November.  At the time that he was contacted he has been out of his metoprolol for a few days.  He does have a full refill of his metoprolol and is asked to contact us if he has any issues with respect to refilling his medications.  We talked about follow-up stress testing given this finding with a negative stress echocardiogram July 2020.  He wanted to hold on additional testing at this time but we will plan to rediscuss if we identify any other significant dysrhythmia.    2.  Hypertension.  Blood pressure appears to be under good control.  Recent laboratory studies from his primary care provider are reviewed.  He does have some mild renal insufficiency with a creatinine of 1.55 in July 2021 following with his primary care provider with normal potassium at that time.     3.  Dyslipidemia with good control of the lipids with the current dose of rosuvastatin.  Lipids in January 2021 with a total cholesterol 143, triglycerides 121, LDL of 62.    Follow-up 1 year with close monitoring of device interrogation as outlined above.       History of Present Illness/Subjective    HPI: Dima Gilliland is a 71 year old male who is seen in follow-up.  He is here today for pacemaker interrogation.  Reports that he is not feeling well.  He denies chest pain or shortness of breath palpitations or dizziness.  His pacemaker interrogation today demonstrates normal pacemaker  function.  He did have documented nonsustained ventricular tachycardia event in November.  He recalls that he has run out of his metoprolol.  We talked about contacting my office visit about running out of medications.  He has had occasional episodes of nonsustained ventricular tachycardia with a normal stress echocardiogram completed July 2020.  We talked about whether we would repeat echocardiography or stress echocardiography given this finding in November but he wanted to keep close eye and avoid additional testing at this time.  I did indicate that if he saw more frequent dysrhythmia that we would reconsider.    Recent Echocardiogram Results:  Reading Physician Reading Date Result Priority   Justin Hoffmann MD  [phone] 7/24/2020 Routine   Provider, Historical 7/24/2020      Narrative & Impression  1. Normal stress echocardiogram without evidence of stress induced ischemia.   2. Normal resting LV systolic performance with an ejection fraction of 55-60%. There is normal improvement in left ventricular systolic performance with a peak ejection fraction of 70-75%.  3. No ECG evidence of ischemia.  4. No anginal chest pain reported with exercise.  5. Good functional capacity for age.     Stress Summary: Patient exercised on the Almas protocol for a total of 9:00 minutes. Peak heart rate achieved was 136 b.p.m (91 % max.) with a double-product of 24,024. The patient stopped exercise due to generalized fatigue. No chest pain symptoms were   reported.     Electrocardiogram: Baseline ECG reveals normal sinus rhythm without evidence of prior myocardial injury. With exercise, there are no significant ECG changes to suggest ischemia (subtle nonspecific ST changes only).     Baseline echocardiogram: Technically adequate images were obtained in the standard quad-screen format. LV systolic performance is normal with a visually estimated ejection fraction of 55-60%. There is normal regional wall motion.  Mild  enlargement of   the proximal ascending aorta.     Post exercise echocardiogram: Technically adequate images were obtained immediately post exercise in the standard quad-screen format. LV systolic performance normally improves with a peak ejection fraction of 70-75%. There are no stress induced regional   wall motion abnormalities.      Recent Coronary Angiogram Results:     Information    Exam Date Exam Time Exam Date Exam Time Accession # Performing Department Results    12/2/21  4:31 AM 12/2/21  8:44 AM [id number] [facility]        Narrative & Impression    Type: routine remote pacemaker transmission.   Presenting rhythm: sinus 60's.  Battery/lead status: stable.  Arrhythmias: since 8/24/21; one mode switch episode <1min, appears to be atrial tachy arrhythmia. One ventricular high rate episode, appears to be NSVT 10bts 185 bpm. 3/5/2020 EF 69% by Echo.  Comments: Normal magnet and pacemaker function. Routed to device RN for review. SHAWANDA Del Cid, Device Specialist. ADD: NSVT noted during sleep.  See note in Epic. Routed to Dr. Clarice Tang RN      I have reviewed and interpreted the device interrogation, settings, programming, and encounter summary. The device is functioning within normal device parameters. I agree with the current findings, assessment and plan.     Contains abnormal data Echocardiogram Complete  Order: [id number]   Status: Final result     Visible to patient: Yes (not seen)     Next appt: 02/04/2022 at 09:20 AM in Cardiology ([facility] Device Nurse 1)     Dx: Pure hypercholesterolemia; Cardiac pa...     1 Result Note    Details    Reading Physician Reading Date Result Priority   Jimmie Smith MD  [phone] 3/5/2020 Routine   Provider, Historical 3/5/2020      Narrative & Impression    Left ventricle ejection fraction is normal. The calculated left ventricular ejection fraction is 69%.    Normal left ventricular size and systolic function.    Normal right  ventricular size and systolic function.    No hemodynamically significant valvular heart abnormalities.    When compared to the previous study dated 10/4/2018, no significant change.          Physical Examination  Review of Systems   Vitals: 138/84, weight 215 pounds heart rate of 60 and regular  Wt Readings from Last 3 Encounters:   12/21/20 96.2 kg (212 lb)   04/27/20 98.4 kg (217 lb)   02/19/20 98.9 kg (218 lb)       General Appearance:   no distress, normal body habitus   ENT/Mouth: membranes moist, no oral lesions or bleeding gums.      EYES:  no scleral icterus, normal conjunctivae   Neck: no carotid bruits or thyromegaly   Chest/Lungs:   lungs are clear to auscultation, no rales or wheezing, pacemaker site well-healed, equal chest wall expansion    Cardiovascular:   Regular. Normal first and second heart sounds with no murmurs, rubs, or gallops; the carotid, radial and posterior tibial pulses are intact, Jugular venous pressure within normal limits, no edema bilaterally    Abdomen:  no organomegaly, masses, bruits, or tenderness; bowel sounds are present   Extremities: no cyanosis or clubbing   Skin: no xanthelasma, warm.    Neurologic: normal  bilateral, no tremors     Psychiatric: alert and oriented x3, calm        Please refer above for cardiac ROS details.        Medical History  Surgical History Family History Social History   Past Medical History:   Diagnosis Date     Diabetes mellitus, type II (H)      High cholesterol      History of chest pain 1/2013    per pt thought to be over-exertion while shoveling snow     Hypertension      Sinus bradycardia seen on cardiac monitor 3/26/15    asystole 15 sce intervals     SSS (sick sinus syndrome) (H) 10/15/2015     Syncope     Echo nl, GXT neg, MRI head neg, US carotid neg     Past Surgical History:   Procedure Laterality Date     CYSTOSCOPY PROSTATE W/ LASER N/A 9/9/2014    Procedure: CYSTOSCOPY TRANSURETHRAL RESECTION PROSTATE;  Surgeon: Rocky Abbott  MD Jyotsna;  Location: [facility];  Service:      CYSTOSCOPY W/ LITHOLAPAXY / EHL N/A 2014    Procedure: HOLMIUN LASER CYSTOLITHOLAPAXY;  Surgeon: Rocky Goyal MD;  Location: [facility];  Service:      EXCISE GANGLION WRIST       IMPLANT PACEMAKER  3/26/15    [facility]     INSERT / REPLACE / REMOVE PACEMAKER       LAPAROSCOPIC CHOLECYSTECTOMY N/A 2014    Procedure: CHOLECYSTECTOMY LAPAROSCOPIC;  Surgeon: Brady Estevez MD;  Location: [facility];  Service:      OTHER SURGICAL HISTORY  2014    Cystoscopy Laser Cystolitholapaxy     Family History   Problem Relation Age of Onset     Cancer Father      Chronic Obstructive Pulmonary Disease Father      Chronic Obstructive Pulmonary Disease Mother      Heart Failure Mother         Social History     Socioeconomic History     Marital status:      Spouse name: Not on file     Number of children: Not on file     Years of education: Not on file     Highest education level: Not on file   Occupational History     Not on file   Tobacco Use     Smoking status: Former Smoker     Packs/day: 25.00     Years: 1.00     Pack years: 25.00     Quit date: 2006     Years since quittin.1     Smokeless tobacco: Never Used   Substance and Sexual Activity     Alcohol use: Yes     Alcohol/week: 2.0 standard drinks     Comment: Alcoholic Drinks/day: Not in the last month or so     Drug use: No     Sexual activity: Yes     Partners: Female   Other Topics Concern     Not on file   Social History Narrative     Not on file     Social Determinants of Health     Financial Resource Strain: Not on file   Food Insecurity: Not on file   Transportation Needs: Not on file   Physical Activity: Not on file   Stress: Not on file   Social Connections: Not on file   Intimate Partner Violence: Not on file   Housing Stability: Not on file           Medications  Allergies   Current Outpatient Medications   Medication Sig Dispense Refill      ACCU-CHEK BILLY PLUS TEST STRP strips [ACCU-CHEK BILLY PLUS TEST STRP STRIPS] TEST TWICE DAILY 200 strip 2     aspirin 81 MG EC tablet [ASPIRIN 81 MG EC TABLET] Take 81 mg by mouth daily.       generic lancets [GENERIC LANCETS] Check sugars once daily 2 each 6     losartan (COZAAR) 25 MG tablet [LOSARTAN (COZAAR) 25 MG TABLET] Take 1 tablet (25 mg total) by mouth daily. 90 tablet 3     metFORMIN (GLUCOPHAGE) 1000 MG tablet [METFORMIN (GLUCOPHAGE) 1000 MG TABLET] Take 1 tablet (1,000 mg total) by mouth 2 (two) times a day. 180 tablet 3     metoprolol succinate (TOPROL-XL) 50 MG 24 hr tablet [METOPROLOL SUCCINATE (TOPROL-XL) 50 MG 24 HR TABLET] TAKE 1/2 TABLET BY MOUTH ONCE DAILY 45 tablet 1     nitroglycerin (NITROSTAT) 0.4 MG SL tablet [NITROGLYCERIN (NITROSTAT) 0.4 MG SL TABLET] Place 1 tablet (0.4 mg total) under the tongue every 5 (five) minutes as needed for chest pain. 25 tablet 1     omeprazole (PRILOSEC) 20 MG capsule [OMEPRAZOLE (PRILOSEC) 20 MG CAPSULE] Take 20 mg by mouth daily before breakfast.       rosuvastatin (CRESTOR) 10 MG tablet [ROSUVASTATIN (CRESTOR) 10 MG TABLET] Take 1 tablet (10 mg total) by mouth at bedtime. 90 tablet 3     No Known Allergies       Lab Results    Chemistry/lipid CBC Cardiac Enzymes/BNP/TSH/INR   Recent Labs   Lab Test 10/26/18  1037   CHOL 106   HDL 46   LDL 45   TRIG 73     Recent Labs   Lab Test 10/26/18  1037 07/18/18  1441 08/31/16  0743   LDL 45 49 48     Recent Labs   Lab Test 04/20/20  1522      POTASSIUM 4.9   CHLORIDE 107   CO2 22      BUN 20   CR 1.38*   GFRESTIMATED 51*   LEMUEL 8.9     Recent Labs   Lab Test 04/20/20  1522 09/27/19  0914 07/24/19  1620   CR 1.38* 1.32* 1.75*     Recent Labs   Lab Test 06/27/19  1433 02/26/19  0859 10/26/18  1037   A1C 6.4* 6.0 6.2*    Recent Labs   Lab Test 04/20/20  1522   WBC 7.1   HGB 12.9*   HCT 39.4*   MCV 86        Recent Labs   Lab Test 04/20/20  1522 07/24/19  1620 10/26/18  1037   HGB 12.9* 13.3* 13.0*     Recent Labs   Lab Test 12/21/20  0840 04/20/20  1522 07/24/19  1620   TROPONINI <0.01 <0.01 <0.01     Recent Labs   Lab Test 04/20/20  1522   BNP 19     No results for input(s): TSH in the last 93616 hours.  No results for input(s): INR in the last 76198 hours.       Brady Marrero MD  [facility]      cc:   Rocky Gaffney MD  [address] HE[facility] [place] [address]

## 2022-05-10 ENCOUNTER — ANCILLARY PROCEDURE (OUTPATIENT)
Dept: CARDIOLOGY | Facility: CLINIC | Age: 72
End: 2022-05-10
Attending: INTERNAL MEDICINE
Payer: COMMERCIAL

## 2022-05-10 DIAGNOSIS — I49.5 SICK SINUS SYNDROME (H): ICD-10-CM

## 2022-05-10 DIAGNOSIS — Z95.0 PACEMAKER: ICD-10-CM

## 2022-05-10 LAB
MDC_IDC_EPISODE_DTM: NORMAL
MDC_IDC_EPISODE_DTM: NORMAL
MDC_IDC_EPISODE_ID: NORMAL
MDC_IDC_EPISODE_ID: NORMAL
MDC_IDC_EPISODE_TYPE: NORMAL
MDC_IDC_EPISODE_TYPE: NORMAL
MDC_IDC_LEAD_IMPLANT_DT: NORMAL
MDC_IDC_LEAD_IMPLANT_DT: NORMAL
MDC_IDC_LEAD_LOCATION: NORMAL
MDC_IDC_LEAD_LOCATION: NORMAL
MDC_IDC_LEAD_LOCATION_DETAIL_1: NORMAL
MDC_IDC_LEAD_LOCATION_DETAIL_1: NORMAL
MDC_IDC_LEAD_MFG: NORMAL
MDC_IDC_LEAD_MFG: NORMAL
MDC_IDC_LEAD_MODEL: NORMAL
MDC_IDC_LEAD_MODEL: NORMAL
MDC_IDC_LEAD_POLARITY_TYPE: NORMAL
MDC_IDC_LEAD_POLARITY_TYPE: NORMAL
MDC_IDC_LEAD_SERIAL: NORMAL
MDC_IDC_LEAD_SERIAL: NORMAL
MDC_IDC_MSMT_BATTERY_DTM: NORMAL
MDC_IDC_MSMT_BATTERY_REMAINING_LONGEVITY: 42 MO
MDC_IDC_MSMT_BATTERY_REMAINING_PERCENTAGE: 66 %
MDC_IDC_MSMT_BATTERY_STATUS: NORMAL
MDC_IDC_MSMT_LEADCHNL_RA_IMPEDANCE_VALUE: 507 OHM
MDC_IDC_MSMT_LEADCHNL_RA_PACING_THRESHOLD_AMPLITUDE: 0.7 V
MDC_IDC_MSMT_LEADCHNL_RA_PACING_THRESHOLD_PULSEWIDTH: 0.5 MS
MDC_IDC_MSMT_LEADCHNL_RV_IMPEDANCE_VALUE: 640 OHM
MDC_IDC_MSMT_LEADCHNL_RV_PACING_THRESHOLD_AMPLITUDE: 0.7 V
MDC_IDC_MSMT_LEADCHNL_RV_PACING_THRESHOLD_PULSEWIDTH: 0.4 MS
MDC_IDC_PG_IMPLANT_DTM: NORMAL
MDC_IDC_PG_MFG: NORMAL
MDC_IDC_PG_MODEL: NORMAL
MDC_IDC_PG_SERIAL: NORMAL
MDC_IDC_PG_TYPE: NORMAL
MDC_IDC_SESS_CLINIC_NAME: NORMAL
MDC_IDC_SESS_DTM: NORMAL
MDC_IDC_SESS_TYPE: NORMAL
MDC_IDC_SET_BRADY_AT_MODE_SWITCH_MODE: NORMAL
MDC_IDC_SET_BRADY_AT_MODE_SWITCH_RATE: 150 {BEATS}/MIN
MDC_IDC_SET_BRADY_LOWRATE: 60 {BEATS}/MIN
MDC_IDC_SET_BRADY_MAX_SENSOR_RATE: 130 {BEATS}/MIN
MDC_IDC_SET_BRADY_MAX_TRACKING_RATE: 130 {BEATS}/MIN
MDC_IDC_SET_BRADY_MODE: NORMAL
MDC_IDC_SET_BRADY_PAV_DELAY_HIGH: 200 MS
MDC_IDC_SET_BRADY_PAV_DELAY_LOW: 250 MS
MDC_IDC_SET_BRADY_SAV_DELAY_HIGH: 200 MS
MDC_IDC_SET_BRADY_SAV_DELAY_LOW: 250 MS
MDC_IDC_SET_LEADCHNL_RA_PACING_AMPLITUDE: 1.5 V
MDC_IDC_SET_LEADCHNL_RA_PACING_POLARITY: NORMAL
MDC_IDC_SET_LEADCHNL_RA_PACING_PULSEWIDTH: 0.5 MS
MDC_IDC_SET_LEADCHNL_RA_SENSING_ADAPTATION_MODE: NORMAL
MDC_IDC_SET_LEADCHNL_RA_SENSING_POLARITY: NORMAL
MDC_IDC_SET_LEADCHNL_RA_SENSING_SENSITIVITY: 0.25 MV
MDC_IDC_SET_LEADCHNL_RV_PACING_AMPLITUDE: 1.2 V
MDC_IDC_SET_LEADCHNL_RV_PACING_CAPTURE_MODE: NORMAL
MDC_IDC_SET_LEADCHNL_RV_PACING_POLARITY: NORMAL
MDC_IDC_SET_LEADCHNL_RV_PACING_PULSEWIDTH: 0.4 MS
MDC_IDC_SET_LEADCHNL_RV_SENSING_ADAPTATION_MODE: NORMAL
MDC_IDC_SET_LEADCHNL_RV_SENSING_POLARITY: NORMAL
MDC_IDC_SET_LEADCHNL_RV_SENSING_SENSITIVITY: 0.6 MV
MDC_IDC_SET_ZONE_DETECTION_INTERVAL: 333 MS
MDC_IDC_SET_ZONE_TYPE: NORMAL
MDC_IDC_SET_ZONE_VENDOR_TYPE: NORMAL
MDC_IDC_STAT_AT_BURDEN_PERCENT: 0 %
MDC_IDC_STAT_AT_DTM_END: NORMAL
MDC_IDC_STAT_AT_DTM_START: NORMAL
MDC_IDC_STAT_BRADY_DTM_END: NORMAL
MDC_IDC_STAT_BRADY_DTM_START: NORMAL
MDC_IDC_STAT_BRADY_RA_PERCENT_PACED: 21 %
MDC_IDC_STAT_BRADY_RV_PERCENT_PACED: 0 %
MDC_IDC_STAT_EPISODE_RECENT_COUNT: 0
MDC_IDC_STAT_EPISODE_RECENT_COUNT_DTM_END: NORMAL
MDC_IDC_STAT_EPISODE_RECENT_COUNT_DTM_START: NORMAL
MDC_IDC_STAT_EPISODE_TYPE: NORMAL
MDC_IDC_STAT_EPISODE_VENDOR_TYPE: NORMAL

## 2022-05-10 PROCEDURE — 93294 REM INTERROG EVL PM/LDLS PM: CPT | Performed by: INTERNAL MEDICINE

## 2022-05-10 PROCEDURE — 93296 REM INTERROG EVL PM/IDS: CPT | Performed by: INTERNAL MEDICINE

## 2022-07-17 ENCOUNTER — HEALTH MAINTENANCE LETTER (OUTPATIENT)
Age: 72
End: 2022-07-17

## 2022-08-09 ENCOUNTER — OFFICE VISIT (OUTPATIENT)
Dept: CARDIOLOGY | Facility: CLINIC | Age: 72
End: 2022-08-09
Attending: INTERNAL MEDICINE
Payer: COMMERCIAL

## 2022-08-09 VITALS
WEIGHT: 215.5 LBS | DIASTOLIC BLOOD PRESSURE: 60 MMHG | BODY MASS INDEX: 30.92 KG/M2 | SYSTOLIC BLOOD PRESSURE: 124 MMHG | OXYGEN SATURATION: 96 % | HEART RATE: 61 BPM | RESPIRATION RATE: 17 BRPM

## 2022-08-09 DIAGNOSIS — I25.10 CORONARY ARTERY DISEASE DUE TO LIPID RICH PLAQUE: ICD-10-CM

## 2022-08-09 DIAGNOSIS — I25.83 CORONARY ARTERY DISEASE DUE TO LIPID RICH PLAQUE: ICD-10-CM

## 2022-08-09 DIAGNOSIS — I49.5 SSS (SICK SINUS SYNDROME) (H): Primary | ICD-10-CM

## 2022-08-09 PROCEDURE — 99214 OFFICE O/P EST MOD 30 MIN: CPT | Performed by: INTERNAL MEDICINE

## 2022-08-09 RX ORDER — TAMSULOSIN HYDROCHLORIDE 0.4 MG/1
CAPSULE ORAL PRN
COMMUNITY
Start: 2022-06-21

## 2022-08-09 RX ORDER — FERROUS GLUCONATE 324(38)MG
TABLET ORAL
COMMUNITY
Start: 2022-02-08

## 2022-08-09 NOTE — PATIENT INSTRUCTIONS
Please ask your kidney doctor if there are plans to follow up on your potassium and kidney function.If you would like me to draw blood please let me know.My nurse is Estee and her number is [phone]

## 2022-08-09 NOTE — PROGRESS NOTES
HEART CARE ENCOUNTER CONSULTATON NOTE      [facility]  [phone]      Assessment/Recommendations   Assessment/Plan:  1.  Sick sinus syndrome status post pacemaker implantation a few years ago with normal pacemaker function.  It is noted he had 1 episode of short nonsustained ventricular tachycardia in November.  At that time he was out of his metoprolol for a few days.  We had discussed follow-up stress testing which he indicated he was feeling well and wanted to not pursue at that time.  He continues to report no specific cardiovascular symptoms.  He will have a repeat pacemaker interrogation at the end of August and we will review the results at that time.    2.  Hypertension.  Blood pressure appears to be under good control.  Blood pressure today 124/60.  He does have some underlying renal insufficiency and follows with nephrology.  He reports having a follow-up in the near future.  I did note that his potassium was mildly high on recent laboratory studies of 5.2 with a creatinine was stable around 1.5.  He will continue to follow-up with nephrology.  He continues on losartan 25 mg daily and metoprolol 50 mg daily.    3.  Dyslipidemia on 10 mg of rosuvastatin.  Primary prevention.  Lipid results from February 2022 finds a total cholesterol 115, triglycerides 124, LDL of 48.      Plan as outlined above with follow-up in 6 to 8 months.       History of Present Illness/Subjective    HPI: Dima  DONNY Gilliland is a 72 year old male who returns for follow-up.  He reports that he has been feeling well.  He specifically denies chest pain, shortness of breath, dizziness or lightheadedness.  He reports that he continues to be active and has no specific cardiovascular symptoms.  He has a history of pacemaker implantation for sick sinus syndrome.  He has on occasion demonstrated nonsustained ventricular tachycardia in November.  At that time he recalls inadvertently running out of his metoprolol.  The  most recent device interrogation from May did not identify any ventricular tachyarrhythmias.  He had an exercise stress echocardiogram July 2020 that was within normal limits.  He is due for repeat pacemaker interrogation in August.    Recent Echocardiogram Results:    Status: Final result     Visible to patient: Yes (not seen)     Next appt: 08/09/2022 at 10:20 AM in Cardiology (Brady Marrero MD)     Dx: Chest discomfort     1 Result Note    Details    Reading Physician Reading Date Result Priority   Justin Hoffmann MD  [phone] 7/24/2020 Routine   Provider, Historical 7/24/2020      Narrative & Impression  1. Normal stress echocardiogram without evidence of stress induced ischemia.   2. Normal resting LV systolic performance with an ejection fraction of 55-60%. There is normal improvement in left ventricular systolic performance with a peak ejection fraction of 70-75%.  3. No ECG evidence of ischemia.  4. No anginal chest pain reported with exercise.  5. Good functional capacity for age.     Stress Summary: Patient exercised on the [place]mas protocol for a total of 9:00 minutes. Peak heart rate achieved was 136 b.p.m (91 % max.) with a double-product of 24,024. The patient stopped exercise due to generalized fatigue. No chest pain symptoms were   reported.     Electrocardiogram: Baseline ECG reveals normal sinus rhythm without evidence of prior myocardial injury. With exercise, there are no significant ECG changes to suggest ischemia (subtle nonspecific ST changes only).     Baseline echocardiogram: Technically adequate images were obtained in the standard quad-screen format. LV systolic performance is normal with a visually estimated ejection fraction of 55-60%. There is normal regional wall motion.  Mild enlargement of   the proximal ascending aorta.     Post exercise echocardiogram: Technically adequate images were obtained immediately post exercise in the standard quad-screen format. LV systolic  performance normally improves with a peak ejection fraction of 70-75%. There are no stress induced regional   wall motion abnormalities.             Recent Coronary Angiogram Results:    5/10/22  5:13 AM 5/10/22  8:55 AM [id number] [facility]          Narrative & Impression    Type: routine remote pacemaker transmission.  Presenting rhythm: normal sinus rate 64 bpm.  Battery/lead status: stable  Arrhythmias: since 2/4/22, none detected.  Comments: normal magnet and pacemaker function.  INDIANA Loja, Device Specialist     I have reviewed and interpreted the device interrogation, settings, programming, and encounter summary. The device is functioning within normal device parameters. I agree with the current findings, assessment and plan.     /4/22  9:15 AM 2/4/22  9:42 AM [id number] [facility]          Narrative & Impression    Type:  Annual in-clinic pacemaker check with Dr. Marrero  Presenting Rhythm:  APVS, 60bpm  Lead/Battery status: Stable lead and battery measurements.  Arrhythmias:  21 mode switches for <1% of the time. Longest episode lasted 1 min 23 seconds. EGM shows AT. 3 VHR, EGM available shows 11 beats of NSVT on 11/6/2021.  Comments: Normal device function. No programming changes made. YY             Physical Examination  Review of Systems   Vitals: 124/60, weight 215 pounds, heart rate of 61 and regular  Wt Readings from Last 3 Encounters:   02/04/22 97.5 kg (215 lb)   12/21/20 96.2 kg (212 lb)   04/27/20 98.4 kg (217 lb)       General Appearance:   no distress, normal body habitus   ENT/Mouth:  Wearing a facemask.      EYES:  no scleral icterus, normal conjunctivae   Neck: no carotid bruits or thyromegaly   Chest/Lungs:   lungs are clear to auscultation, no rales or wheezing, pacemaker site intact well-healed, equal chest wall expansion    Cardiovascular:   Regular. Normal first and second heart sounds with no murmurs, rubs, or gallops; the carotid,  radial and posterior tibial pulses are intact, Jugular venous pressure within normal limits, no significant edema bilaterally    Abdomen:  no organomegaly, masses, bruits, or tenderness; bowel sounds are present   Extremities: no cyanosis or clubbing   Skin: no xanthelasma, warm.    Neurologic:  no tremors     Psychiatric: alert and oriented x3, calm        Please refer above for cardiac ROS details.        Medical History  Surgical History Family History Social History   Past Medical History:   Diagnosis Date     Diabetes mellitus, type II (H)      High cholesterol      History of chest pain 1/2013    per pt thought to be over-exertion while shoveling snow     Hypertension      Sinus bradycardia seen on cardiac monitor 3/26/15    asystole 15 sce intervals     SSS (sick sinus syndrome) (H) 10/15/2015     Syncope     Echo nl, GXT neg, MRI head neg, US carotid neg     Past Surgical History:   Procedure Laterality Date     CYSTOSCOPY PROSTATE W/ LASER N/A 9/9/2014    Procedure: CYSTOSCOPY TRANSURETHRAL RESECTION PROSTATE;  Surgeon: Rocky Goyal MD;  Location: [facility];  Service:      CYSTOSCOPY W/ LITHOLAPAXY / EHL N/A 8/26/2014    Procedure: HOLMIUN LASER CYSTOLITHOLAPAXY;  Surgeon: Rocky Goyal MD;  Location: [facility];  Service:      EXCISE GANGLION WRIST       IMPLANT PACEMAKER  3/26/15    [facility]     INSERT / REPLACE / REMOVE PACEMAKER  2015     LAPAROSCOPIC CHOLECYSTECTOMY N/A 7/23/2014    Procedure: CHOLECYSTECTOMY LAPAROSCOPIC;  Surgeon: Brady Estevez MD;  Location: [facility];  Service:      OTHER SURGICAL HISTORY  08/26/2014    Cystoscopy Laser Cystolitholapaxy     Family History   Problem Relation Age of Onset     Cancer Father      Chronic Obstructive Pulmonary Disease Father      Chronic Obstructive Pulmonary Disease Mother      Heart Failure Mother         Social History     Socioeconomic History     Marital status:      Spouse name: Not on file      Number of children: Not on file     Years of education: Not on file     Highest education level: Not on file   Occupational History     Not on file   Tobacco Use     Smoking status: Former Smoker     Packs/day: 25.00     Years: 1.00     Pack years: 25.00     Quit date: 2006     Years since quittin.6     Smokeless tobacco: Never Used   Substance and Sexual Activity     Alcohol use: Yes     Alcohol/week: 2.0 standard drinks     Comment: Alcoholic Drinks/day: Not in the last month or so     Drug use: No     Sexual activity: Yes     Partners: Female   Other Topics Concern     Not on file   Social History Narrative     Not on file     Social Determinants of Health     Financial Resource Strain: Not on file   Food Insecurity: Not on file   Transportation Needs: Not on file   Physical Activity: Not on file   Stress: Not on file   Social Connections: Not on file   Intimate Partner Violence: Not on file   Housing Stability: Not on file           Medications  Allergies   Current Outpatient Medications   Medication Sig Dispense Refill     ACCU-CHEK BILLY PLUS TEST STRP strips [ACCU-CHEK BILLY PLUS TEST STRP STRIPS] TEST TWICE DAILY 200 strip 2     generic lancets [GENERIC LANCETS] Check sugars once daily 2 each 6     losartan (COZAAR) 25 MG tablet [LOSARTAN (COZAAR) 25 MG TABLET] Take 1 tablet (25 mg total) by mouth daily. 90 tablet 3     metFORMIN (GLUCOPHAGE) 1000 MG tablet [METFORMIN (GLUCOPHAGE) 1000 MG TABLET] Take 1 tablet (1,000 mg total) by mouth 2 (two) times a day. 180 tablet 3     metoprolol succinate (TOPROL-XL) 50 MG 24 hr tablet [METOPROLOL SUCCINATE (TOPROL-XL) 50 MG 24 HR TABLET] TAKE 1/2 TABLET BY MOUTH ONCE DAILY 45 tablet 1     nitroglycerin (NITROSTAT) 0.4 MG SL tablet [NITROGLYCERIN (NITROSTAT) 0.4 MG SL TABLET] Place 1 tablet (0.4 mg total) under the tongue every 5 (five) minutes as needed for chest pain. 25 tablet 1     omeprazole (PRILOSEC) 20 MG capsule [OMEPRAZOLE (PRILOSEC) 20 MG CAPSULE]  Take 20 mg by mouth daily before breakfast.       rosuvastatin (CRESTOR) 10 MG tablet [ROSUVASTATIN (CRESTOR) 10 MG TABLET] Take 1 tablet (10 mg total) by mouth at bedtime. 90 tablet 3     No Known Allergies       Lab Results    Chemistry/lipid CBC Cardiac Enzymes/BNP/TSH/INR   Recent Labs   Lab Test 10/26/18  1037   CHOL 106   HDL 46   LDL 45   TRIG 73     Recent Labs   Lab Test 10/26/18  1037 07/18/18  1441 08/31/16  0743   LDL 45 49 48     Recent Labs   Lab Test 04/20/20  1522      POTASSIUM 4.9   CHLORIDE 107   CO2 22      BUN 20   CR 1.38*   GFRESTIMATED 51*   LEMUEL 8.9     Recent Labs   Lab Test 04/20/20  1522 09/27/19  0914 07/24/19  1620   CR 1.38* 1.32* 1.75*     Recent Labs   Lab Test 06/27/19  1433 02/26/19  0859 10/26/18  1037   A1C 6.4* 6.0 6.2*          Recent Labs   Lab Test 04/20/20  1522   WBC 7.1   HGB 12.9*   HCT 39.4*   MCV 86        Recent Labs   Lab Test 04/20/20  1522 07/24/19  1620 10/26/18  1037   HGB 12.9* 13.3* 13.0*    Recent Labs   Lab Test 12/21/20  0840 04/20/20  1522 07/24/19  1620   TROPONINI <0.01 <0.01 <0.01     Recent Labs   Lab Test 04/20/20  1522   BNP 19     No results for input(s): TSH in the last 70427 hours.  No results for input(s): INR in the last 46151 hours.     Brady Marrero MD

## 2022-08-09 NOTE — LETTER
8/9/2022    Benny Li MD  [facility] [address] [place] [address]    RE: Dima Gilliland       Dear Colleague,     I had the pleasure of seeing Dima Gilliland in the [facility].    HEART CARE ENCOUNTER CONSULTATON NOTE      [facility]  [phone]      Assessment/Recommendations   Assessment/Plan:  1.  Sick sinus syndrome status post pacemaker implantation a few years ago with normal pacemaker function.  It is noted he had 1 episode of short nonsustained ventricular tachycardia in November.  At that time he was out of his metoprolol for a few days.  We had discussed follow-up stress testing which he indicated he was feeling well and wanted to not pursue at that time.  He continues to report no specific cardiovascular symptoms.  He will have a repeat pacemaker interrogation at the end of August and we will review the results at that time.    2.  Hypertension.  Blood pressure appears to be under good control.  Blood pressure today 124/60.  He does have some underlying renal insufficiency and follows with nephrology.  He reports having a follow-up in the near future.  I did note that his potassium was mildly high on recent laboratory studies of 5.2 with a creatinine was stable around 1.5.  He will continue to follow-up with nephrology.  He continues on losartan 25 mg daily and metoprolol 50 mg daily.    3.  Dyslipidemia on 10 mg of rosuvastatin.  Primary prevention.  Lipid results from February 2022 finds a total cholesterol 115, triglycerides 124, LDL of 48.      Plan as outlined above with follow-up in 6 to 8 months.       History of Present Illness/Subjective    HPI: Dima Gilliland is a 72 year old male who returns for follow-up.  He reports that he has been feeling well.  He specifically denies chest pain, shortness of breath, dizziness or lightheadedness.  He reports that he continues to be active and has no specific cardiovascular  symptoms.  He has a history of pacemaker implantation for sick sinus syndrome.  He has on occasion demonstrated nonsustained ventricular tachycardia in November.  At that time he recalls inadvertently running out of his metoprolol.  The most recent device interrogation from May did not identify any ventricular tachyarrhythmias.  He had an exercise stress echocardiogram July 2020 that was within normal limits.  He is due for repeat pacemaker interrogation in August.    Recent Echocardiogram Results:    Status: Final result     Visible to patient: Yes (not seen)     Next appt: 08/09/2022 at 10:20 AM in Cardiology (Brady Marrero MD)     Dx: Chest discomfort     1 Result Note    Details    Reading Physician Reading Date Result Priority   Justin Hoffmann MD  [phone] 7/24/2020 Routine   Provider, Historical 7/24/2020      Narrative & Impression  1. Normal stress echocardiogram without evidence of stress induced ischemia.   2. Normal resting LV systolic performance with an ejection fraction of 55-60%. There is normal improvement in left ventricular systolic performance with a peak ejection fraction of 70-75%.  3. No ECG evidence of ischemia.  4. No anginal chest pain reported with exercise.  5. Good functional capacity for age.     Stress Summary: Patient exercised on the [place] protocol for a total of 9:00 minutes. Peak heart rate achieved was 136 b.p.m (91 % max.) with a double-product of 24,024. The patient stopped exercise due to generalized fatigue. No chest pain symptoms were   reported.     Electrocardiogram: Baseline ECG reveals normal sinus rhythm without evidence of prior myocardial injury. With exercise, there are no significant ECG changes to suggest ischemia (subtle nonspecific ST changes only).     Baseline echocardiogram: Technically adequate images were obtained in the standard quad-screen format. LV systolic performance is normal with a visually estimated ejection fraction of 55-60%. There  is normal regional wall motion.  Mild enlargement of   the proximal ascending aorta.     Post exercise echocardiogram: Technically adequate images were obtained immediately post exercise in the standard quad-screen format. LV systolic performance normally improves with a peak ejection fraction of 70-75%. There are no stress induced regional   wall motion abnormalities.             Recent Coronary Angiogram Results:    5/10/22  5:13 AM 5/10/22  8:55 AM [id number] [facility]          Narrative & Impression    Type: routine remote pacemaker transmission.  Presenting rhythm: normal sinus rate 64 bpm.  Battery/lead status: stable  Arrhythmias: since 2/4/22, none detected.  Comments: normal magnet and pacemaker function.  INDIANA Loja, Device Specialist     I have reviewed and interpreted the device interrogation, settings, programming, and encounter summary. The device is functioning within normal device parameters. I agree with the current findings, assessment and plan.     /4/22  9:15 AM 2/4/22  9:42 AM [id number] [facility]          Narrative & Impression    Type:  Annual in-clinic pacemaker check with Dr. Marrero  Presenting Rhythm:  APVS, 60bpm  Lead/Battery status: Stable lead and battery measurements.  Arrhythmias:  21 mode switches for <1% of the time. Longest episode lasted 1 min 23 seconds. EGM shows AT. 3 VHR, EGM available shows 11 beats of NSVT on 11/6/2021.  Comments: Normal device function. No programming changes made. YY             Physical Examination  Review of Systems   Vitals: 124/60, weight 215 pounds, heart rate of 61 and regular  Wt Readings from Last 3 Encounters:   02/04/22 97.5 kg (215 lb)   12/21/20 96.2 kg (212 lb)   04/27/20 98.4 kg (217 lb)       General Appearance:   no distress, normal body habitus   ENT/Mouth:  Wearing a facemask.      EYES:  no scleral icterus, normal conjunctivae   Neck: no carotid bruits or thyromegaly   Chest/Lungs:    lungs are clear to auscultation, no rales or wheezing, pacemaker site intact well-healed, equal chest wall expansion    Cardiovascular:   Regular. Normal first and second heart sounds with no murmurs, rubs, or gallops; the carotid, radial and posterior tibial pulses are intact, Jugular venous pressure within normal limits, no significant edema bilaterally    Abdomen:  no organomegaly, masses, bruits, or tenderness; bowel sounds are present   Extremities: no cyanosis or clubbing   Skin: no xanthelasma, warm.    Neurologic:  no tremors     Psychiatric: alert and oriented x3, calm        Please refer above for cardiac ROS details.        Medical History  Surgical History Family History Social History   Past Medical History:   Diagnosis Date     Diabetes mellitus, type II (H)      High cholesterol      History of chest pain 1/2013    per pt thought to be over-exertion while shoveling snow     Hypertension      Sinus bradycardia seen on cardiac monitor 3/26/15    asystole 15 sce intervals     SSS (sick sinus syndrome) (H) 10/15/2015     Syncope     Echo nl, GXT neg, MRI head neg, US carotid neg     Past Surgical History:   Procedure Laterality Date     CYSTOSCOPY PROSTATE W/ LASER N/A 9/9/2014    Procedure: CYSTOSCOPY TRANSURETHRAL RESECTION PROSTATE;  Surgeon: Rocky Goyal MD;  Location: [facility];  Service:      CYSTOSCOPY W/ LITHOLAPAXY / EHL N/A 8/26/2014    Procedure: HOLMIUN LASER CYSTOLITHOLAPAXY;  Surgeon: Rocky Goyal MD;  Location: [facility];  Service:      EXCISE GANGLION WRIST       IMPLANT PACEMAKER  3/26/15    [facility]     INSERT / REPLACE / REMOVE PACEMAKER  2015     LAPAROSCOPIC CHOLECYSTECTOMY N/A 7/23/2014    Procedure: CHOLECYSTECTOMY LAPAROSCOPIC;  Surgeon: Brady Estevez MD;  Location: [facility];  Service:      OTHER SURGICAL HISTORY  08/26/2014    Cystoscopy Laser Cystolitholapaxy     Family History   Problem Relation Age of Onset     Cancer Father       Chronic Obstructive Pulmonary Disease Father      Chronic Obstructive Pulmonary Disease Mother      Heart Failure Mother         Social History     Socioeconomic History     Marital status:      Spouse name: Not on file     Number of children: Not on file     Years of education: Not on file     Highest education level: Not on file   Occupational History     Not on file   Tobacco Use     Smoking status: Former Smoker     Packs/day: 25.00     Years: 1.00     Pack years: 25.00     Quit date: 2006     Years since quittin.6     Smokeless tobacco: Never Used   Substance and Sexual Activity     Alcohol use: Yes     Alcohol/week: 2.0 standard drinks     Comment: Alcoholic Drinks/day: Not in the last month or so     Drug use: No     Sexual activity: Yes     Partners: Female   Other Topics Concern     Not on file   Social History Narrative     Not on file     Social Determinants of Health     Financial Resource Strain: Not on file   Food Insecurity: Not on file   Transportation Needs: Not on file   Physical Activity: Not on file   Stress: Not on file   Social Connections: Not on file   Intimate Partner Violence: Not on file   Housing Stability: Not on file           Medications  Allergies   Current Outpatient Medications   Medication Sig Dispense Refill     ACCU-CHEK BILLY PLUS TEST STRP strips [ACCU-CHEK BILLY PLUS TEST STRP STRIPS] TEST TWICE DAILY 200 strip 2     generic lancets [GENERIC LANCETS] Check sugars once daily 2 each 6     losartan (COZAAR) 25 MG tablet [LOSARTAN (COZAAR) 25 MG TABLET] Take 1 tablet (25 mg total) by mouth daily. 90 tablet 3     metFORMIN (GLUCOPHAGE) 1000 MG tablet [METFORMIN (GLUCOPHAGE) 1000 MG TABLET] Take 1 tablet (1,000 mg total) by mouth 2 (two) times a day. 180 tablet 3     metoprolol succinate (TOPROL-XL) 50 MG 24 hr tablet [METOPROLOL SUCCINATE (TOPROL-XL) 50 MG 24 HR TABLET] TAKE 1/2 TABLET BY MOUTH ONCE DAILY 45 tablet 1     nitroglycerin (NITROSTAT) 0.4 MG SL tablet  [NITROGLYCERIN (NITROSTAT) 0.4 MG SL TABLET] Place 1 tablet (0.4 mg total) under the tongue every 5 (five) minutes as needed for chest pain. 25 tablet 1     omeprazole (PRILOSEC) 20 MG capsule [OMEPRAZOLE (PRILOSEC) 20 MG CAPSULE] Take 20 mg by mouth daily before breakfast.       rosuvastatin (CRESTOR) 10 MG tablet [ROSUVASTATIN (CRESTOR) 10 MG TABLET] Take 1 tablet (10 mg total) by mouth at bedtime. 90 tablet 3     No Known Allergies       Lab Results    Chemistry/lipid CBC Cardiac Enzymes/BNP/TSH/INR   Recent Labs   Lab Test 10/26/18  1037   CHOL 106   HDL 46   LDL 45   TRIG 73     Recent Labs   Lab Test 10/26/18  1037 07/18/18  1441 08/31/16  0743   LDL 45 49 48     Recent Labs   Lab Test 04/20/20  1522      POTASSIUM 4.9   CHLORIDE 107   CO2 22      BUN 20   CR 1.38*   GFRESTIMATED 51*   LEMUEL 8.9     Recent Labs   Lab Test 04/20/20  1522 09/27/19  0914 07/24/19  1620   CR 1.38* 1.32* 1.75*     Recent Labs   Lab Test 06/27/19  1433 02/26/19  0859 10/26/18  1037   A1C 6.4* 6.0 6.2*          Recent Labs   Lab Test 04/20/20  1522   WBC 7.1   HGB 12.9*   HCT 39.4*   MCV 86        Recent Labs   Lab Test 04/20/20  1522 07/24/19  1620 10/26/18  1037   HGB 12.9* 13.3* 13.0*    Recent Labs   Lab Test 12/21/20  0840 04/20/20  1522 07/24/19  1620   TROPONINI <0.01 <0.01 <0.01     Recent Labs   Lab Test 04/20/20  1522   BNP 19     No results for input(s): TSH in the last 26625 hours.  No results for input(s): INR in the last 62965 hours.     Brady Marrero MD                Thank you for allowing me to participate in the care of your patient.      Sincerely,     Brady Marrero MD     [facility]  cc:   Brady Marrero MD  [address]  [place] [address]

## 2022-08-29 ENCOUNTER — ANCILLARY PROCEDURE (OUTPATIENT)
Dept: CARDIOLOGY | Facility: CLINIC | Age: 72
End: 2022-08-29
Attending: INTERNAL MEDICINE
Payer: COMMERCIAL

## 2022-08-29 ENCOUNTER — TELEPHONE (OUTPATIENT)
Dept: CARDIOLOGY | Facility: CLINIC | Age: 72
End: 2022-08-29

## 2022-08-29 DIAGNOSIS — I47.29 NSVT (NONSUSTAINED VENTRICULAR TACHYCARDIA) (H): Primary | ICD-10-CM

## 2022-08-29 DIAGNOSIS — Z95.0 CARDIAC PACEMAKER IN SITU: ICD-10-CM

## 2022-08-29 DIAGNOSIS — I49.5 SICK SINUS SYNDROME (H): ICD-10-CM

## 2022-08-29 PROCEDURE — 93296 REM INTERROG EVL PM/IDS: CPT | Performed by: INTERNAL MEDICINE

## 2022-08-29 PROCEDURE — 93294 REM INTERROG EVL PM/LDLS PM: CPT | Performed by: INTERNAL MEDICINE

## 2022-08-29 NOTE — TELEPHONE ENCOUNTER
Type: routine remote pacemaker transmission.  Device: [employer]  Pacing%/Programmed: AP 21%,  0%, in DDD  ppm mode.  Lead(s): stable  Battery longevity: estimated 3.5 yrs.  Presenting: normal sinus, rate 78 bpm.  Atrial arrhythmias: since 5/10/22, three AT/AF, all <1 minute.  Ventricular arrhythmias: since 5/10/22, one nonsustained ventricular high rate episode; EGM suggests 8-bt VT rate 185 bpm.  Device/Lead alerts: none  Comments: normal magnet and pacemaker function. Routed to device RN.  Plan: next remote scheduled December 6, 2022.  INDIANA Loja, Device Specialist    Hx NSVT on past remote,  0%. Last EF 55-60% Per negative stress Echo. Takes Toprol XL 25 mg once daily. Reviewed with patient, to his knowledge does not recall any correlating symptoms. Advised to call with any troublesome events. Verbalized understanding.     Will update Dr. Marrero. Next remote scheduled 12/6/22.  Pebbles Combs RN

## 2022-09-04 LAB
MDC_IDC_EPISODE_DTM: NORMAL
MDC_IDC_EPISODE_DURATION: 2 S
MDC_IDC_EPISODE_DURATION: 5 S
MDC_IDC_EPISODE_DURATION: 6 S
MDC_IDC_EPISODE_DURATION: 9 S
MDC_IDC_EPISODE_ID: NORMAL
MDC_IDC_EPISODE_TYPE: NORMAL
MDC_IDC_LEAD_IMPLANT_DT: NORMAL
MDC_IDC_LEAD_IMPLANT_DT: NORMAL
MDC_IDC_LEAD_LOCATION: NORMAL
MDC_IDC_LEAD_LOCATION: NORMAL
MDC_IDC_LEAD_LOCATION_DETAIL_1: NORMAL
MDC_IDC_LEAD_LOCATION_DETAIL_1: NORMAL
MDC_IDC_LEAD_MFG: NORMAL
MDC_IDC_LEAD_MFG: NORMAL
MDC_IDC_LEAD_MODEL: NORMAL
MDC_IDC_LEAD_MODEL: NORMAL
MDC_IDC_LEAD_POLARITY_TYPE: NORMAL
MDC_IDC_LEAD_POLARITY_TYPE: NORMAL
MDC_IDC_LEAD_SERIAL: NORMAL
MDC_IDC_LEAD_SERIAL: NORMAL
MDC_IDC_MSMT_BATTERY_DTM: NORMAL
MDC_IDC_MSMT_BATTERY_REMAINING_LONGEVITY: 42 MO
MDC_IDC_MSMT_BATTERY_REMAINING_PERCENTAGE: 63 %
MDC_IDC_MSMT_BATTERY_STATUS: NORMAL
MDC_IDC_MSMT_LEADCHNL_RA_IMPEDANCE_VALUE: 537 OHM
MDC_IDC_MSMT_LEADCHNL_RA_PACING_THRESHOLD_AMPLITUDE: 0.7 V
MDC_IDC_MSMT_LEADCHNL_RA_PACING_THRESHOLD_PULSEWIDTH: 0.5 MS
MDC_IDC_MSMT_LEADCHNL_RV_IMPEDANCE_VALUE: 673 OHM
MDC_IDC_MSMT_LEADCHNL_RV_PACING_THRESHOLD_AMPLITUDE: 0.7 V
MDC_IDC_MSMT_LEADCHNL_RV_PACING_THRESHOLD_PULSEWIDTH: 0.4 MS
MDC_IDC_PG_IMPLANT_DTM: NORMAL
MDC_IDC_PG_MFG: NORMAL
MDC_IDC_PG_MODEL: NORMAL
MDC_IDC_PG_SERIAL: NORMAL
MDC_IDC_PG_TYPE: NORMAL
MDC_IDC_SESS_CLINIC_NAME: NORMAL
MDC_IDC_SESS_DTM: NORMAL
MDC_IDC_SESS_TYPE: NORMAL
MDC_IDC_SET_BRADY_AT_MODE_SWITCH_MODE: NORMAL
MDC_IDC_SET_BRADY_AT_MODE_SWITCH_RATE: 150 {BEATS}/MIN
MDC_IDC_SET_BRADY_LOWRATE: 60 {BEATS}/MIN
MDC_IDC_SET_BRADY_MAX_SENSOR_RATE: 130 {BEATS}/MIN
MDC_IDC_SET_BRADY_MAX_TRACKING_RATE: 130 {BEATS}/MIN
MDC_IDC_SET_BRADY_MODE: NORMAL
MDC_IDC_SET_BRADY_PAV_DELAY_HIGH: 200 MS
MDC_IDC_SET_BRADY_PAV_DELAY_LOW: 250 MS
MDC_IDC_SET_BRADY_SAV_DELAY_HIGH: 200 MS
MDC_IDC_SET_BRADY_SAV_DELAY_LOW: 250 MS
MDC_IDC_SET_LEADCHNL_RA_PACING_AMPLITUDE: 1.5 V
MDC_IDC_SET_LEADCHNL_RA_PACING_POLARITY: NORMAL
MDC_IDC_SET_LEADCHNL_RA_PACING_PULSEWIDTH: 0.5 MS
MDC_IDC_SET_LEADCHNL_RA_SENSING_ADAPTATION_MODE: NORMAL
MDC_IDC_SET_LEADCHNL_RA_SENSING_POLARITY: NORMAL
MDC_IDC_SET_LEADCHNL_RA_SENSING_SENSITIVITY: 0.25 MV
MDC_IDC_SET_LEADCHNL_RV_PACING_AMPLITUDE: 1.2 V
MDC_IDC_SET_LEADCHNL_RV_PACING_CAPTURE_MODE: NORMAL
MDC_IDC_SET_LEADCHNL_RV_PACING_POLARITY: NORMAL
MDC_IDC_SET_LEADCHNL_RV_PACING_PULSEWIDTH: 0.4 MS
MDC_IDC_SET_LEADCHNL_RV_SENSING_ADAPTATION_MODE: NORMAL
MDC_IDC_SET_LEADCHNL_RV_SENSING_POLARITY: NORMAL
MDC_IDC_SET_LEADCHNL_RV_SENSING_SENSITIVITY: 0.6 MV
MDC_IDC_SET_ZONE_DETECTION_INTERVAL: 333 MS
MDC_IDC_SET_ZONE_TYPE: NORMAL
MDC_IDC_SET_ZONE_VENDOR_TYPE: NORMAL
MDC_IDC_STAT_AT_BURDEN_PERCENT: 1 %
MDC_IDC_STAT_AT_DTM_END: NORMAL
MDC_IDC_STAT_AT_DTM_START: NORMAL
MDC_IDC_STAT_BRADY_DTM_END: NORMAL
MDC_IDC_STAT_BRADY_DTM_START: NORMAL
MDC_IDC_STAT_BRADY_RA_PERCENT_PACED: 21 %
MDC_IDC_STAT_BRADY_RV_PERCENT_PACED: 0 %
MDC_IDC_STAT_EPISODE_RECENT_COUNT: 0
MDC_IDC_STAT_EPISODE_RECENT_COUNT: 0
MDC_IDC_STAT_EPISODE_RECENT_COUNT: 1
MDC_IDC_STAT_EPISODE_RECENT_COUNT: 3
MDC_IDC_STAT_EPISODE_RECENT_COUNT_DTM_END: NORMAL
MDC_IDC_STAT_EPISODE_RECENT_COUNT_DTM_START: NORMAL
MDC_IDC_STAT_EPISODE_TYPE: NORMAL
MDC_IDC_STAT_EPISODE_VENDOR_TYPE: NORMAL

## 2022-09-06 NOTE — PROGRESS NOTES
Rec'd return call from patient - informed him of Dr. Marrero's recommendations after review of recent device ck - patient verbalized understanding, offered no questions/concerns and agreed to proceed - call transf to schedAnel  mg

## 2022-09-06 NOTE — PROGRESS NOTES
Msg rec'd 9-5-22 @ 1931:  Brady Marrero MD Gebel, Mandy L, RN; Estee Malik, FLORES  Noted, infreq episodes, its monica 2 years so if willing would schedule an echocardiogram  ty mdg

## 2022-09-06 NOTE — PROGRESS NOTES
Left msg for patient requesting call back for recommendations - order placed per protocol - follow-up pending 2/2023.  mg

## 2022-09-12 ENCOUNTER — HOSPITAL ENCOUNTER (OUTPATIENT)
Dept: CARDIOLOGY | Facility: CLINIC | Age: 72
Discharge: HOME | End: 2022-09-12
Attending: INTERNAL MEDICINE | Admitting: INTERNAL MEDICINE
Payer: COMMERCIAL

## 2022-09-12 DIAGNOSIS — I47.29 NSVT (NONSUSTAINED VENTRICULAR TACHYCARDIA) (H): ICD-10-CM

## 2022-09-12 LAB — LVEF ECHO: NORMAL

## 2022-09-12 PROCEDURE — 93306 TTE W/DOPPLER COMPLETE: CPT

## 2022-09-12 PROCEDURE — 93306 TTE W/DOPPLER COMPLETE: CPT | Mod: 26 | Performed by: INTERNAL MEDICINE

## 2022-09-25 ENCOUNTER — HEALTH MAINTENANCE LETTER (OUTPATIENT)
Age: 72
End: 2022-09-25

## 2022-11-02 ENCOUNTER — TELEPHONE (OUTPATIENT)
Dept: CARDIOLOGY | Facility: CLINIC | Age: 72
End: 2022-11-02

## 2022-11-02 DIAGNOSIS — I10 HTN (HYPERTENSION): ICD-10-CM

## 2022-11-02 NOTE — TELEPHONE ENCOUNTER
Return call to pt - PCP is filling metoprolol succinate 25 mg daily, Dr Marrero's note says 50 mg daily, and chart shows 1/2 50 mg tablet daily.  Pt bottle is 25 mg tab - and he is taking 1 tab daily.    Echo result note discussed increasing dose, but pt said he is still taking metoprolol succinate 25 mg daily.    Dr Marrero - ok to fill under your name, and at what dose?  -[facility]

## 2022-11-02 NOTE — TELEPHONE ENCOUNTER
[facility] Call Center    Phone Message    May a detailed message be left on voicemail: yes     Reason for Call: Medication Refill Request    Has the patient contacted the pharmacy for the refill? Yes   Name of medication being requested: Metoprolol 50 mg  Provider who prescribed the medication: Dr. Marrero  Pharmacy: [facility]  Date medication is needed: 11/02/2022   Pt would like Dr. Marrero to take over this medication and if any questions reach out to pt to discuss      Action Taken: Message routed to:  [facility] ([facility] Cardio    Travel Screening: Not Applicable

## 2022-11-03 RX ORDER — METOPROLOL SUCCINATE 50 MG/1
50 TABLET, EXTENDED RELEASE ORAL DAILY
Qty: 90 TABLET | Refills: 1 | Status: SHIPPED | OUTPATIENT
Start: 2022-11-03 | End: 2023-11-02

## 2022-11-03 NOTE — TELEPHONE ENCOUNTER
Recommendations discussed with pt.  Pt verbalized understanding and had no questions.  -rah    ===View-only below this line===  ----- Message -----  From: Brady Marrero MD  Sent: 11/3/2022   6:21 AM CDT  To: Estee Malik RN  Subject: RE:                                              Metoprolol xl 50mg daily. Monitor for dizziness and keep track of blood pressure. Thanks.

## 2022-12-06 ENCOUNTER — ANCILLARY PROCEDURE (OUTPATIENT)
Dept: CARDIOLOGY | Facility: CLINIC | Age: 72
End: 2022-12-06
Payer: COMMERCIAL

## 2022-12-06 DIAGNOSIS — I49.5 SICK SINUS SYNDROME (H): ICD-10-CM

## 2022-12-06 DIAGNOSIS — Z95.0 CARDIAC PACEMAKER IN SITU: ICD-10-CM

## 2022-12-07 LAB
MDC_IDC_EPISODE_DTM: NORMAL
MDC_IDC_EPISODE_DTM: NORMAL
MDC_IDC_EPISODE_ID: NORMAL
MDC_IDC_EPISODE_ID: NORMAL
MDC_IDC_EPISODE_TYPE: NORMAL
MDC_IDC_EPISODE_TYPE: NORMAL
MDC_IDC_LEAD_IMPLANT_DT: NORMAL
MDC_IDC_LEAD_IMPLANT_DT: NORMAL
MDC_IDC_LEAD_LOCATION: NORMAL
MDC_IDC_LEAD_LOCATION: NORMAL
MDC_IDC_LEAD_LOCATION_DETAIL_1: NORMAL
MDC_IDC_LEAD_LOCATION_DETAIL_1: NORMAL
MDC_IDC_LEAD_MFG: NORMAL
MDC_IDC_LEAD_MFG: NORMAL
MDC_IDC_LEAD_MODEL: NORMAL
MDC_IDC_LEAD_MODEL: NORMAL
MDC_IDC_LEAD_POLARITY_TYPE: NORMAL
MDC_IDC_LEAD_POLARITY_TYPE: NORMAL
MDC_IDC_LEAD_SERIAL: NORMAL
MDC_IDC_LEAD_SERIAL: NORMAL
MDC_IDC_MSMT_BATTERY_DTM: NORMAL
MDC_IDC_MSMT_BATTERY_REMAINING_LONGEVITY: 36 MO
MDC_IDC_MSMT_BATTERY_REMAINING_PERCENTAGE: 61 %
MDC_IDC_MSMT_BATTERY_STATUS: NORMAL
MDC_IDC_MSMT_LEADCHNL_RA_IMPEDANCE_VALUE: 533 OHM
MDC_IDC_MSMT_LEADCHNL_RA_PACING_THRESHOLD_AMPLITUDE: 0.7 V
MDC_IDC_MSMT_LEADCHNL_RA_PACING_THRESHOLD_PULSEWIDTH: 0.5 MS
MDC_IDC_MSMT_LEADCHNL_RV_IMPEDANCE_VALUE: 619 OHM
MDC_IDC_MSMT_LEADCHNL_RV_PACING_THRESHOLD_AMPLITUDE: 0.7 V
MDC_IDC_MSMT_LEADCHNL_RV_PACING_THRESHOLD_PULSEWIDTH: 0.4 MS
MDC_IDC_PG_IMPLANT_DTM: NORMAL
MDC_IDC_PG_MFG: NORMAL
MDC_IDC_PG_MODEL: NORMAL
MDC_IDC_PG_SERIAL: NORMAL
MDC_IDC_PG_TYPE: NORMAL
MDC_IDC_SESS_CLINIC_NAME: NORMAL
MDC_IDC_SESS_DTM: NORMAL
MDC_IDC_SESS_TYPE: NORMAL
MDC_IDC_SET_BRADY_AT_MODE_SWITCH_MODE: NORMAL
MDC_IDC_SET_BRADY_AT_MODE_SWITCH_RATE: 150 {BEATS}/MIN
MDC_IDC_SET_BRADY_LOWRATE: 60 {BEATS}/MIN
MDC_IDC_SET_BRADY_MAX_SENSOR_RATE: 130 {BEATS}/MIN
MDC_IDC_SET_BRADY_MAX_TRACKING_RATE: 130 {BEATS}/MIN
MDC_IDC_SET_BRADY_MODE: NORMAL
MDC_IDC_SET_BRADY_PAV_DELAY_HIGH: 200 MS
MDC_IDC_SET_BRADY_PAV_DELAY_LOW: 250 MS
MDC_IDC_SET_BRADY_SAV_DELAY_HIGH: 200 MS
MDC_IDC_SET_BRADY_SAV_DELAY_LOW: 250 MS
MDC_IDC_SET_LEADCHNL_RA_PACING_AMPLITUDE: 1.5 V
MDC_IDC_SET_LEADCHNL_RA_PACING_POLARITY: NORMAL
MDC_IDC_SET_LEADCHNL_RA_PACING_PULSEWIDTH: 0.5 MS
MDC_IDC_SET_LEADCHNL_RA_SENSING_ADAPTATION_MODE: NORMAL
MDC_IDC_SET_LEADCHNL_RA_SENSING_POLARITY: NORMAL
MDC_IDC_SET_LEADCHNL_RA_SENSING_SENSITIVITY: 0.25 MV
MDC_IDC_SET_LEADCHNL_RV_PACING_AMPLITUDE: 1.2 V
MDC_IDC_SET_LEADCHNL_RV_PACING_CAPTURE_MODE: NORMAL
MDC_IDC_SET_LEADCHNL_RV_PACING_POLARITY: NORMAL
MDC_IDC_SET_LEADCHNL_RV_PACING_PULSEWIDTH: 0.4 MS
MDC_IDC_SET_LEADCHNL_RV_SENSING_ADAPTATION_MODE: NORMAL
MDC_IDC_SET_LEADCHNL_RV_SENSING_POLARITY: NORMAL
MDC_IDC_SET_LEADCHNL_RV_SENSING_SENSITIVITY: 0.6 MV
MDC_IDC_SET_ZONE_DETECTION_INTERVAL: 333 MS
MDC_IDC_SET_ZONE_TYPE: NORMAL
MDC_IDC_SET_ZONE_VENDOR_TYPE: NORMAL
MDC_IDC_STAT_AT_BURDEN_PERCENT: 1 %
MDC_IDC_STAT_AT_DTM_END: NORMAL
MDC_IDC_STAT_AT_DTM_START: NORMAL
MDC_IDC_STAT_BRADY_DTM_END: NORMAL
MDC_IDC_STAT_BRADY_DTM_START: NORMAL
MDC_IDC_STAT_BRADY_RA_PERCENT_PACED: 22 %
MDC_IDC_STAT_BRADY_RV_PERCENT_PACED: 0 %
MDC_IDC_STAT_EPISODE_RECENT_COUNT: 0
MDC_IDC_STAT_EPISODE_RECENT_COUNT_DTM_END: NORMAL
MDC_IDC_STAT_EPISODE_RECENT_COUNT_DTM_START: NORMAL
MDC_IDC_STAT_EPISODE_TYPE: NORMAL
MDC_IDC_STAT_EPISODE_VENDOR_TYPE: NORMAL

## 2022-12-07 PROCEDURE — 93296 REM INTERROG EVL PM/IDS: CPT | Performed by: INTERNAL MEDICINE

## 2022-12-07 PROCEDURE — 93294 REM INTERROG EVL PM/LDLS PM: CPT | Performed by: INTERNAL MEDICINE

## 2023-02-06 NOTE — PROGRESS NOTES
HEART CARE ENCOUNTER CONSULTATON NOTE      [facility]  [phone]      Assessment/Recommendations   Assessment/Plan:  1.  Sick sinus syndrome status post pacemaker implantation a few years ago secondary to near syncopal episodes.  He has had infrequent episodes of short duration nonsustained ventricular tachycardia.  Most recent August 16, 2022.  No associated symptoms.  Echocardiogram from September revealed normal systolic function.  He had a negative stress echocardiogram in July 2020.  He will continue to monitor for any specific cardiovascular symptoms and update me.    2.  Hypertension.  Blood pressure appears to be under good control today.  His blood pressure was 135/79.  He does have underlying renal insufficiency and tells me he will be following up with his primary care provider.  He is on losartan.  The most recent lab work is from his primary care provider in April at which time his potassium was mildly high at 5.2 and creatinine was stable around 1.46.  I have encouraged him to follow-up with his primary care provider for follow-up of his renal insufficiency.  In addition he has been on iron supplementation with the most recent hemoglobin from March 2022 that was 13.1.  I have asked him to discuss with his primary care provider if she can cut back or discontinue the iron.    3.  Diabetes mellitus.  Hemoglobin A1c July 2022 of 8.5.  4.  Dyslipidemia.  Total cholesterol 115, LDL 48 in February 2022.  He is on Crestor 10 mg daily.  5.  Screening for abdominal aortic aneurysm.  He has had a remote history of tobacco use.  We will plan for abdominal aortic aneurysm screening with ultrasound.    As outlined above  Follow-up in 6 months.          History of Present Illness/Subjective    HPI: Dima Gilliland is a 72 year old male who is seen in follow-up.  He has a history of sick sinus syndrome with pacemaker implantation.  His monitor has intermittently shown nonsustained  ventricular tachycardia that was asymptomatic.  At one point he had inadvertently run out of his metoprolol.  He had an exercise stress echocardiogram in 2020 that was within normal limits.  In 2022 he had device interrogation 1 nonsustained ventricular tachycardic episode of 8 beats in duration.  Echocardiogram completed in September revealed normal systolic function with mild left-ventricular hypertrophy.  The most recent device interrogation is from 2022 with normal device function was reported.  Today he returns for follow-up and device interrogation.  He reports that he has been feeling well.  He has had no specific complaints of chest pain, shortness of breath, dizziness or palpitation.  Device interrogation today demonstrates no additional ventricular tachycardic events since 2022.  He has had some occasional atrial tachycardia.  He is atrial paced 22% of the time.  Normal device function.  He tells me that on his own he decreased the metoprolol to 25 mg daily because of some mild lightheadedness upon standing.    Recent Echocardiogram Results:  Echocardiogram Complete  Order: [id number]   Status: Edited Result - FINAL      Visible to patient: Yes (not seen)      Next appt: 2023 at 09:00 AM in Cardiology ([facility] Device Nurse 1)      Dx: NSVT (nonsustained ventricular tachyc...      2 Result Notes     1 Patient Communication     1 Follow-up Encounter  Details    Reading Physician Reading Date Result Priority   Shayne Faith MD  [phone] 2022      Narrative & Impression  [id number]  [id number]  [id number]  [id number]^ALLAN^DEON^[place] [address]     Name: CRISTIAN GRACE SR HINES  MRN: [id number]  : 1950  Study Date: 2022 09:11 AM  Age: 72 yrs  Gender: Male  Patient Location: [facility]  Reason For Study: NSVT (nonsustained ventricular tachycardia) (H)  Ordering Physician: DEON LEMUS  Referring Physician: DEON LEMUS  D  Performed By: ONEYDA     BSA: 2.2 m2  Height: 70 in  Weight: 215 lb  HR: 61  BP: 132/70 mmHg  ______________________________________________________________________________  Procedure  Complete Echo Adult.  ______________________________________________________________________________  Interpretation Summary     Left ventricular size, wall motion and function are normal. The ejection  fraction is 60-65%.  There is mild concentric left ventricular hypertrophy.  Normal right ventricle size and systolic function.  There is a pacemaker lead in the right ventricle.  No obvious valvular disease.     No previous study.        Echocardiogram Exercise Stress  Order: [id number]   Status: Final result      Visible to patient: Yes (not seen)      Next appt: 02/07/2023 at 09:00 AM in Cardiology ([facility] Device Nurse 1)      Dx: Chest discomfort      1 Result Note  Details    Reading Physician Reading Date Result Priority   Justin Hoffmann MD  [phone] 7/24/2020 Routine   Provider, Historical 7/24/2020      Narrative & Impression  1. Normal stress echocardiogram without evidence of stress induced ischemia.   2. Normal resting LV systolic performance with an ejection fraction of 55-60%. There is normal improvement in left ventricular systolic performance with a peak ejection fraction of 70-75%.  3. No ECG evidence of ischemia.  4. No anginal chest pain reported with exercise.  5. Good functional capacity for age.     Stress Summary: Patient exercised on the [place] protocol for a total of 9:00 minutes. Peak heart rate achieved was 136 b.p.m (91 % max.) with a double-product of 24,024. The patient stopped exercise due to generalized fatigue. No chest pain symptoms were   reported.     Electrocardiogram: Baseline ECG reveals normal sinus rhythm without evidence of prior myocardial injury. With exercise, there are no significant ECG changes to suggest ischemia (subtle nonspecific ST changes only).     Baseline echocardiogram:  Technically adequate images were obtained in the standard quad-screen format. LV systolic performance is normal with a visually estimated ejection fraction of 55-60%. There is normal regional wall motion.  Mild enlargement of   the proximal ascending aorta.     Post exercise echocardiogram: Technically adequate images were obtained immediately post exercise in the standard quad-screen format. LV systolic performance normally improves with a peak ejection fraction of 70-75%. There are no stress induced regional   wall motion abnormalities.          Cardiac Device Check - Remote (Standing ORD 5 count) [[id number]] (Order [id number])  Exam Information    Exam Date Exam Time Exam Date Exam Time Accession # Performing Department Results    12/6/22  4:31 AM 12/6/22  9:03 AM [id number] [facility]      Narrative & Impression    Type: routine remote pacemaker transmission.  Device: [employer]  Pacing%/Programmed: AP 22%,  0%, in DDD  ppm mode.  Lead(s): stable  Battery longevity: estimated 3 yrs  Presenting: normal sinus rhythm and AP-VS, rate 60 bpm.  Atrial/Ventricular arrhythmias: since 8/29/22, none detected.  Device/Lead alerts: n/a  Comments: normal magnet and pacemaker function.  Plan: due for annual device clinic visit in February 2023, letter sent asking patient to schedule.  INDIANA Loja, Device Specialist          Physical Examination  Review of Systems   Vitals:   Wt Readings from Last 3 Encounters:   08/09/22 97.8 kg (215 lb 8 oz)   02/04/22 97.5 kg (215 lb)   12/21/20 96.2 kg (212 lb)       General Appearance:   no distress, normal body habitus   ENT/Mouth:  Wearing a facemask      EYES:  no scleral icterus, normal conjunctivae   Neck: no carotid bruits or thyromegaly   Chest/Lungs:   lungs are clear to auscultation, no rales or wheezing, , equal chest wall expansion, pacemaker site intact.   Cardiovascular:   Regular. Normal first and second heart sounds with no  murmurs, rubs, or gallops; the carotid, radial and posterior tibial pulses are intact, Jugular venous pressure within normal limits, no significant edema bilaterally    Abdomen:  no , bruits, or tenderness; bowel sounds are present   Extremities: no cyanosis or clubbing   Skin: no xanthelasma, warm.    Neurologic:  no tremors     Psychiatric: alert and oriented x3, calm        Please refer above for cardiac ROS details.        Medical History  Surgical History Family History Social History   Past Medical History:   Diagnosis Date     Diabetes mellitus, type II (H)      High cholesterol      History of chest pain 1/2013    per pt thought to be over-exertion while shoveling snow     Hypertension      Sinus bradycardia seen on cardiac monitor 3/26/15    asystole 15 sce intervals     SSS (sick sinus syndrome) (H) 10/15/2015     Syncope     Echo nl, GXT neg, MRI head neg, US carotid neg     Past Surgical History:   Procedure Laterality Date     CYSTOSCOPY PROSTATE W/ LASER N/A 9/9/2014    Procedure: CYSTOSCOPY TRANSURETHRAL RESECTION PROSTATE;  Surgeon: Rocky Goyal MD;  [facility];  Service:      CYSTOSCOPY W/ LITHOLAPAXY / EHL N/A 8/26/2014    Procedure: HOLMIUN LASER CYSTOLITHOLAPAXY;  Surgeon: Rocky Goyal MD;  Location[facility];  Service:      EXCISE GANGLION WRIST       IMPLANT PACEMAKER  3/26/15    [facility]     INSERT / REPLACE / REMOVE PACEMAKER  2015     LAPAROSCOPIC CHOLECYSTECTOMY N/A 7/23/2014    Procedure: CHOLECYSTECTOMY LAPAROSCOPIC;  Surgeon: Brady Estevez MD;  Location: [facility];  Service:      OTHER SURGICAL HISTORY  08/26/2014    Cystoscopy Laser Cystolitholapaxy     Family History   Problem Relation Age of Onset     Cancer Father      Chronic Obstructive Pulmonary Disease Father      Chronic Obstructive Pulmonary Disease Mother      Heart Failure Mother         Social History     Socioeconomic History     Marital status:      Spouse  name: Not on file     Number of children: Not on file     Years of education: Not on file     Highest education level: Not on file   Occupational History     Not on file   Tobacco Use     Smoking status: Former     Packs/day: 25.00     Years: 1.00     Pack years: 25.00     Types: Cigarettes     Quit date: 2006     Years since quittin.1     Smokeless tobacco: Never   Substance and Sexual Activity     Alcohol use: Yes     Alcohol/week: 2.0 standard drinks     Comment: Alcoholic Drinks/day: Not in the last month or so     Drug use: No     Sexual activity: Yes     Partners: Female   Other Topics Concern     Not on file   Social History Narrative     Not on file     Social Determinants of Health     Financial Resource Strain: Not on file   Food Insecurity: Not on file   Transportation Needs: Not on file   Physical Activity: Not on file   Stress: Not on file   Social Connections: Not on file   Intimate Partner Violence: Not on file   Housing Stability: Not on file           Medications  Allergies   Current Outpatient Medications   Medication Sig Dispense Refill     ACCU-CHEK BILLY PLUS TEST STRP strips [ACCU-CHEK BILLY PLUS TEST STRP STRIPS] TEST TWICE DAILY 200 strip 2     ferrous gluconate (FERGON) 324 (38 Fe) MG tablet ferrous gluconate 324 mg (38 mg iron) tablet   TAKE ONE TABLET BY MOUTH TWICE DAILY       generic lancets [GENERIC LANCETS] Check sugars once daily 2 each 6     losartan (COZAAR) 25 MG tablet [LOSARTAN (COZAAR) 25 MG TABLET] Take 1 tablet (25 mg total) by mouth daily. 90 tablet 3     metFORMIN (GLUCOPHAGE) 1000 MG tablet [METFORMIN (GLUCOPHAGE) 1000 MG TABLET] Take 1 tablet (1,000 mg total) by mouth 2 (two) times a day. 180 tablet 3     metoprolol succinate ER (TOPROL XL) 50 MG 24 hr tablet Take 1 tablet (50 mg) by mouth daily 90 tablet 1     nitroglycerin (NITROSTAT) 0.4 MG SL tablet [NITROGLYCERIN (NITROSTAT) 0.4 MG SL TABLET] Place 1 tablet (0.4 mg total) under the tongue every 5 (five)  minutes as needed for chest pain. 25 tablet 1     omeprazole (PRILOSEC) 20 MG capsule [OMEPRAZOLE (PRILOSEC) 20 MG CAPSULE] Take 20 mg by mouth daily before breakfast.       rosuvastatin (CRESTOR) 10 MG tablet [ROSUVASTATIN (CRESTOR) 10 MG TABLET] Take 1 tablet (10 mg total) by mouth at bedtime. 90 tablet 3     tamsulosin (FLOMAX) 0.4 MG capsule as needed       No Known Allergies       Lab Results    Chemistry/lipid CBC Cardiac Enzymes/BNP/TSH/INR   Recent Labs   Lab Test 10/26/18  1037   CHOL 106   HDL 46   LDL 45   TRIG 73     Recent Labs   Lab Test 10/26/18  1037 07/18/18  1441 08/31/16  0743   LDL 45 49 48     Recent Labs   Lab Test 04/20/20  1522      POTASSIUM 4.9   CHLORIDE 107   CO2 22      BUN 20   CR 1.38*   GFRESTIMATED 51*   LEMUEL 8.9     Recent Labs   Lab Test 04/20/20  1522 09/27/19  0914 07/24/19  1620   CR 1.38* 1.32* 1.75*     Recent Labs   Lab Test 06/27/19  1433 02/26/19  0859 10/26/18  1037   A1C 6.4* 6.0 6.2*          Recent Labs   Lab Test 04/20/20  1522   WBC 7.1   HGB 12.9*   HCT 39.4*   MCV 86        Recent Labs   Lab Test 04/20/20  1522 07/24/19  1620 10/26/18  1037   HGB 12.9* 13.3* 13.0*    Recent Labs   Lab Test 12/21/20  0840 04/20/20  1522 07/24/19  1620   TROPONINI <0.01 <0.01 <0.01     Recent Labs   Lab Test 04/20/20  1522   BNP 19     No results for input(s): TSH in the last 68327 hours.  No results for input(s): INR in the last 97750 hours.     Brady Marrero MD

## 2023-02-07 ENCOUNTER — ANCILLARY PROCEDURE (OUTPATIENT)
Dept: CARDIOLOGY | Facility: CLINIC | Age: 73
End: 2023-02-07
Attending: INTERNAL MEDICINE
Payer: COMMERCIAL

## 2023-02-07 VITALS
HEART RATE: 60 BPM | SYSTOLIC BLOOD PRESSURE: 135 MMHG | WEIGHT: 216 LBS | HEIGHT: 66 IN | DIASTOLIC BLOOD PRESSURE: 79 MMHG | BODY MASS INDEX: 34.72 KG/M2 | OXYGEN SATURATION: 96 %

## 2023-02-07 DIAGNOSIS — I25.10 CORONARY ARTERY DISEASE DUE TO LIPID RICH PLAQUE: ICD-10-CM

## 2023-02-07 DIAGNOSIS — I45.5 SINUS ARREST: ICD-10-CM

## 2023-02-07 DIAGNOSIS — I25.83 CORONARY ARTERY DISEASE DUE TO LIPID RICH PLAQUE: ICD-10-CM

## 2023-02-07 DIAGNOSIS — Z13.6 ENCOUNTER FOR SCREENING FOR ABDOMINAL AORTIC ANEURYSM (AAA) IN PATIENT 50 YEARS OF AGE OR OLDER WITHOUT OTHER RISK FACTORS FOR AAA: Primary | ICD-10-CM

## 2023-02-07 DIAGNOSIS — I49.5 SICK SINUS SYNDROME (H): Primary | ICD-10-CM

## 2023-02-07 DIAGNOSIS — Z95.0 PACEMAKER: ICD-10-CM

## 2023-02-07 PROCEDURE — 93280 PM DEVICE PROGR EVAL DUAL: CPT | Performed by: INTERNAL MEDICINE

## 2023-02-07 PROCEDURE — 99214 OFFICE O/P EST MOD 30 MIN: CPT | Performed by: INTERNAL MEDICINE

## 2023-02-07 NOTE — PATIENT INSTRUCTIONS
Please discuss with your primary recheck of your kidney function.Please call or write to me with any heart related symptoms or concerns.My nurse is Estee and her number is [phone].Please ask your primary about cutting back on the iron.

## 2023-02-07 NOTE — LETTER
2/7/2023    Benny Li MD  [facility] [address] [place] [address]    RE: Dima Gilliland       Dear Colleague,     I had the pleasure of seeing Dima Gilliland in the [facility].    HEART CARE ENCOUNTER CONSULTATON NOTE      [facility]  [phone]      Assessment/Recommendations   Assessment/Plan:  1.  Sick sinus syndrome status post pacemaker implantation a few years ago secondary to near syncopal episodes.  He has had infrequent episodes of short duration nonsustained ventricular tachycardia.  Most recent August 16, 2022.  No associated symptoms.  Echocardiogram from September revealed normal systolic function.  He had a negative stress echocardiogram in July 2020.  He will continue to monitor for any specific cardiovascular symptoms and update me.    2.  Hypertension.  Blood pressure appears to be under good control today.  His blood pressure was 135/79.  He does have underlying renal insufficiency and tells me he will be following up with his primary care provider.  He is on losartan.  The most recent lab work is from his primary care provider in April at which time his potassium was mildly high at 5.2 and creatinine was stable around 1.46.  I have encouraged him to follow-up with his primary care provider for follow-up of his renal insufficiency.  In addition he has been on iron supplementation with the most recent hemoglobin from March 2022 that was 13.1.  I have asked him to discuss with his primary care provider if she can cut back or discontinue the iron.    3.  Diabetes mellitus.  Hemoglobin A1c July 2022 of 8.5.  4.  Dyslipidemia.  Total cholesterol 115, LDL 48 in February 2022.  He is on Crestor 10 mg daily.  5.  Screening for abdominal aortic aneurysm.  He has had a remote history of tobacco use.  We will plan for abdominal aortic aneurysm screening with ultrasound.    As outlined above  Follow-up in 6 months.          History  of Present Illness/Subjective    HPI: Dima Gilliland is a 72 year old male who is seen in follow-up.  He has a history of sick sinus syndrome with pacemaker implantation.  His monitor has intermittently shown nonsustained ventricular tachycardia that was asymptomatic.  At one point he had inadvertently run out of his metoprolol.  He had an exercise stress echocardiogram in 2020 that was within normal limits.  In 2022 he had device interrogation 1 nonsustained ventricular tachycardic episode of 8 beats in duration.  Echocardiogram completed in September revealed normal systolic function with mild left-ventricular hypertrophy.  The most recent device interrogation is from 2022 with normal device function was reported.  Today he returns for follow-up and device interrogation.  He reports that he has been feeling well.  He has had no specific complaints of chest pain, shortness of breath, dizziness or palpitation.  Device interrogation today demonstrates no additional ventricular tachycardic events since 2022.  He has had some occasional atrial tachycardia.  He is atrial paced 22% of the time.  Normal device function.  He tells me that on his own he decreased the metoprolol to 25 mg daily because of some mild lightheadedness upon standing.    Recent Echocardiogram Results:  Echocardiogram Complete  Order: [id number]   Status: Edited Result - FINAL      Visible to patient: Yes (not seen)      Next appt: 2023 at 09:00 AM in Cardiology ([facility] Device Nurse 1)      Dx: NSVT (nonsustained ventricular tachyc...      2 Result Notes     1 Patient Communication     1 Follow-up Encounter  Details    Reading Physician Reading Date Result Priority   Shayne Faith MD  [phone] 2022      Narrative & Impression  [id number]  [id number]  [id number]  [id number]^ALLAN^DEON^[place] [address]     Name: DIMA GILLILAND SR  MRN: [id number]  :  1950  Study Date: 09/12/2022 09:11 AM  Age: 72 yrs  Gender: Male  Patient Location: [facility]  Reason For Study: NSVT (nonsustained ventricular tachycardia) (H)  Ordering Physician: DEON LEMUS  Referring Physician: DEON LEMUS  Performed By: ONEYDA     BSA: 2.2 m2  Height: 70 in  Weight: 215 lb  HR: 61  BP: 132/70 mmHg  ______________________________________________________________________________  Procedure  Complete Echo Adult.  ______________________________________________________________________________  Interpretation Summary     Left ventricular size, wall motion and function are normal. The ejection  fraction is 60-65%.  There is mild concentric left ventricular hypertrophy.  Normal right ventricle size and systolic function.  There is a pacemaker lead in the right ventricle.  No obvious valvular disease.     No previous study.        Echocardiogram Exercise Stress  Order: [id number]   Status: Final result      Visible to patient: Yes (not seen)      Next appt: 02/07/2023 at 09:00 AM in Cardiology ([facility] Device Nurse 1)      Dx: Chest discomfort      1 Result Note  Details    Reading Physician Reading Date Result Priority   Justin Hoffmann MD  [phone] 7/24/2020 Routine   Provider, Historical 7/24/2020      Narrative & Impression  1. Normal stress echocardiogram without evidence of stress induced ischemia.   2. Normal resting LV systolic performance with an ejection fraction of 55-60%. There is normal improvement in left ventricular systolic performance with a peak ejection fraction of 70-75%.  3. No ECG evidence of ischemia.  4. No anginal chest pain reported with exercise.  5. Good functional capacity for age.     Stress Summary: Patient exercised on the [place] protocol for a total of 9:00 minutes. Peak heart rate achieved was 136 b.p.m (91 % max.) with a double-product of 24,024. The patient stopped exercise due to generalized fatigue. No chest pain symptoms were    reported.     Electrocardiogram: Baseline ECG reveals normal sinus rhythm without evidence of prior myocardial injury. With exercise, there are no significant ECG changes to suggest ischemia (subtle nonspecific ST changes only).     Baseline echocardiogram: Technically adequate images were obtained in the standard quad-screen format. LV systolic performance is normal with a visually estimated ejection fraction of 55-60%. There is normal regional wall motion.  Mild enlargement of   the proximal ascending aorta.     Post exercise echocardiogram: Technically adequate images were obtained immediately post exercise in the standard quad-screen format. LV systolic performance normally improves with a peak ejection fraction of 70-75%. There are no stress induced regional   wall motion abnormalities.          Cardiac Device Check - Remote (Standing ORD 5 count) [[id number]] (Order [id number])  Exam Information    Exam Date Exam Time Exam Date Exam Time Accession # Performing Department Results    12/6/22  4:31 AM 12/6/22  9:03 AM [id number] [facility]      Narrative & Impression    Type: routine remote pacemaker transmission.  Device: [employer]  Pacing%/Programmed: AP 22%,  0%, in DDD  ppm mode.  Lead(s): stable  Battery longevity: estimated 3 yrs  Presenting: normal sinus rhythm and AP-VS, rate 60 bpm.  Atrial/Ventricular arrhythmias: since 8/29/22, none detected.  Device/Lead alerts: n/a  Comments: normal magnet and pacemaker function.  Plan: due for annual device clinic visit in February 2023, letter sent asking patient to schedule.  INDIANA Loja, Device Specialist          Physical Examination  Review of Systems   Vitals:   Wt Readings from Last 3 Encounters:   08/09/22 97.8 kg (215 lb 8 oz)   02/04/22 97.5 kg (215 lb)   12/21/20 96.2 kg (212 lb)       General Appearance:   no distress, normal body habitus   ENT/Mouth:  Wearing a facemask      EYES:  no scleral icterus,  normal conjunctivae   Neck: no carotid bruits or thyromegaly   Chest/Lungs:   lungs are clear to auscultation, no rales or wheezing, , equal chest wall expansion, pacemaker site intact.   Cardiovascular:   Regular. Normal first and second heart sounds with no murmurs, rubs, or gallops; the carotid, radial and posterior tibial pulses are intact, Jugular venous pressure within normal limits, no significant edema bilaterally    Abdomen:  no , bruits, or tenderness; bowel sounds are present   Extremities: no cyanosis or clubbing   Skin: no xanthelasma, warm.    Neurologic:  no tremors     Psychiatric: alert and oriented x3, calm        Please refer above for cardiac ROS details.        Medical History  Surgical History Family History Social History   Past Medical History:   Diagnosis Date     Diabetes mellitus, type II (H)      High cholesterol      History of chest pain 1/2013    per pt thought to be over-exertion while shoveling snow     Hypertension      Sinus bradycardia seen on cardiac monitor 3/26/15    asystole 15 sce intervals     SSS (sick sinus syndrome) (H) 10/15/2015     Syncope     Echo nl, GXT neg, MRI head neg, US carotid neg     Past Surgical History:   Procedure Laterality Date     CYSTOSCOPY PROSTATE W/ LASER N/A 9/9/2014    Procedure: CYSTOSCOPY TRANSURETHRAL RESECTION PROSTATE;  Surgeon: Rocky Goyal MD;  Location: [facility];  Service:      CYSTOSCOPY W/ LITHOLAPAXY / EHL N/A 8/26/2014    Procedure: HOLMIUN LASER CYSTOLITHOLAPAXY;  Surgeon: Rocky Goyal MD;  Location: [facility];  Service:      EXCISE GANGLION WRIST       IMPLANT PACEMAKER  3/26/15    [facility]     INSERT / REPLACE / REMOVE PACEMAKER  2015     LAPAROSCOPIC CHOLECYSTECTOMY N/A 7/23/2014    Procedure: CHOLECYSTECTOMY LAPAROSCOPIC;  Surgeon: Brady Estevez MD;  Location: [facility];  Service:      OTHER SURGICAL HISTORY  08/26/2014    Cystoscopy Laser Cystolitholapaxy     Family History    Problem Relation Age of Onset     Cancer Father      Chronic Obstructive Pulmonary Disease Father      Chronic Obstructive Pulmonary Disease Mother      Heart Failure Mother         Social History     Socioeconomic History     Marital status:      Spouse name: Not on file     Number of children: Not on file     Years of education: Not on file     Highest education level: Not on file   Occupational History     Not on file   Tobacco Use     Smoking status: Former     Packs/day: 25.00     Years: 1.00     Pack years: 25.00     Types: Cigarettes     Quit date: 2006     Years since quittin.1     Smokeless tobacco: Never   Substance and Sexual Activity     Alcohol use: Yes     Alcohol/week: 2.0 standard drinks     Comment: Alcoholic Drinks/day: Not in the last month or so     Drug use: No     Sexual activity: Yes     Partners: Female   Other Topics Concern     Not on file   Social History Narrative     Not on file     Social Determinants of Health     Financial Resource Strain: Not on file   Food Insecurity: Not on file   Transportation Needs: Not on file   Physical Activity: Not on file   Stress: Not on file   Social Connections: Not on file   Intimate Partner Violence: Not on file   Housing Stability: Not on file           Medications  Allergies   Current Outpatient Medications   Medication Sig Dispense Refill     ACCU-CHEK BILLY PLUS TEST STRP strips [ACCU-CHEK BILLY PLUS TEST STRP STRIPS] TEST TWICE DAILY 200 strip 2     ferrous gluconate (FERGON) 324 (38 Fe) MG tablet ferrous gluconate 324 mg (38 mg iron) tablet   TAKE ONE TABLET BY MOUTH TWICE DAILY       generic lancets [GENERIC LANCETS] Check sugars once daily 2 each 6     losartan (COZAAR) 25 MG tablet [LOSARTAN (COZAAR) 25 MG TABLET] Take 1 tablet (25 mg total) by mouth daily. 90 tablet 3     metFORMIN (GLUCOPHAGE) 1000 MG tablet [METFORMIN (GLUCOPHAGE) 1000 MG TABLET] Take 1 tablet (1,000 mg total) by mouth 2 (two) times a day. 180 tablet 3      metoprolol succinate ER (TOPROL XL) 50 MG 24 hr tablet Take 1 tablet (50 mg) by mouth daily 90 tablet 1     nitroglycerin (NITROSTAT) 0.4 MG SL tablet [NITROGLYCERIN (NITROSTAT) 0.4 MG SL TABLET] Place 1 tablet (0.4 mg total) under the tongue every 5 (five) minutes as needed for chest pain. 25 tablet 1     omeprazole (PRILOSEC) 20 MG capsule [OMEPRAZOLE (PRILOSEC) 20 MG CAPSULE] Take 20 mg by mouth daily before breakfast.       rosuvastatin (CRESTOR) 10 MG tablet [ROSUVASTATIN (CRESTOR) 10 MG TABLET] Take 1 tablet (10 mg total) by mouth at bedtime. 90 tablet 3     tamsulosin (FLOMAX) 0.4 MG capsule as needed       No Known Allergies       Lab Results    Chemistry/lipid CBC Cardiac Enzymes/BNP/TSH/INR   Recent Labs   Lab Test 10/26/18  1037   CHOL 106   HDL 46   LDL 45   TRIG 73     Recent Labs   Lab Test 10/26/18  1037 07/18/18  1441 08/31/16  0743   LDL 45 49 48     Recent Labs   Lab Test 04/20/20  1522      POTASSIUM 4.9   CHLORIDE 107   CO2 22      BUN 20   CR 1.38*   GFRESTIMATED 51*   LEMUEL 8.9     Recent Labs   Lab Test 04/20/20  1522 09/27/19  0914 07/24/19  1620   CR 1.38* 1.32* 1.75*     Recent Labs   Lab Test 06/27/19  1433 02/26/19  0859 10/26/18  1037   A1C 6.4* 6.0 6.2*          Recent Labs   Lab Test 04/20/20  1522   WBC 7.1   HGB 12.9*   HCT 39.4*   MCV 86        Recent Labs   Lab Test 04/20/20  1522 07/24/19  1620 10/26/18  1037   HGB 12.9* 13.3* 13.0*    Recent Labs   Lab Test 12/21/20  0840 04/20/20  1522 07/24/19  1620   TROPONINI <0.01 <0.01 <0.01     Recent Labs   Lab Test 04/20/20  1522   BNP 19     No results for input(s): TSH in the last 02869 hours.  No results for input(s): INR in the last 69256 hours.     Brady Marrero MD                Thank you for allowing me to participate in the care of your patient.      Sincerely,     Brady Marrero MD     [facility]  cc:   Gonzalez Serrano MD  [address] [place] [address]  [address]  [place] [address]

## 2023-02-16 ENCOUNTER — HOSPITAL ENCOUNTER (OUTPATIENT)
Dept: ULTRASOUND IMAGING | Facility: CLINIC | Age: 73
Discharge: HOME | End: 2023-02-16
Attending: INTERNAL MEDICINE | Admitting: INTERNAL MEDICINE
Payer: COMMERCIAL

## 2023-02-16 DIAGNOSIS — Z13.6 ENCOUNTER FOR SCREENING FOR ABDOMINAL AORTIC ANEURYSM (AAA) IN PATIENT 50 YEARS OF AGE OR OLDER WITHOUT OTHER RISK FACTORS FOR AAA: ICD-10-CM

## 2023-02-16 PROCEDURE — 76706 US ABDL AORTA SCREEN AAA: CPT

## 2023-03-03 LAB
MDC_IDC_LEAD_IMPLANT_DT: NORMAL
MDC_IDC_LEAD_IMPLANT_DT: NORMAL
MDC_IDC_LEAD_LOCATION: NORMAL
MDC_IDC_LEAD_LOCATION: NORMAL
MDC_IDC_LEAD_LOCATION_DETAIL_1: NORMAL
MDC_IDC_LEAD_LOCATION_DETAIL_1: NORMAL
MDC_IDC_LEAD_MFG: NORMAL
MDC_IDC_LEAD_MFG: NORMAL
MDC_IDC_LEAD_MODEL: NORMAL
MDC_IDC_LEAD_MODEL: NORMAL
MDC_IDC_LEAD_POLARITY_TYPE: NORMAL
MDC_IDC_LEAD_POLARITY_TYPE: NORMAL
MDC_IDC_LEAD_SERIAL: NORMAL
MDC_IDC_LEAD_SERIAL: NORMAL
MDC_IDC_MSMT_BATTERY_DTM: NORMAL
MDC_IDC_MSMT_BATTERY_REMAINING_LONGEVITY: 36 MO
MDC_IDC_MSMT_BATTERY_REMAINING_PERCENTAGE: 60 %
MDC_IDC_MSMT_BATTERY_STATUS: NORMAL
MDC_IDC_MSMT_LEADCHNL_RA_IMPEDANCE_VALUE: 526 OHM
MDC_IDC_MSMT_LEADCHNL_RA_PACING_THRESHOLD_AMPLITUDE: 0.6 V
MDC_IDC_MSMT_LEADCHNL_RA_PACING_THRESHOLD_PULSEWIDTH: 0.5 MS
MDC_IDC_MSMT_LEADCHNL_RA_SENSING_INTR_AMPL: 4.9 MV
MDC_IDC_MSMT_LEADCHNL_RV_IMPEDANCE_VALUE: 635 OHM
MDC_IDC_MSMT_LEADCHNL_RV_PACING_THRESHOLD_AMPLITUDE: 0.6 V
MDC_IDC_MSMT_LEADCHNL_RV_PACING_THRESHOLD_PULSEWIDTH: 0.4 MS
MDC_IDC_MSMT_LEADCHNL_RV_SENSING_INTR_AMPL: 15.9 MV
MDC_IDC_PG_IMPLANT_DTM: NORMAL
MDC_IDC_PG_MFG: NORMAL
MDC_IDC_PG_MODEL: NORMAL
MDC_IDC_PG_SERIAL: NORMAL
MDC_IDC_PG_TYPE: NORMAL
MDC_IDC_SESS_CLINIC_NAME: NORMAL
MDC_IDC_SESS_DTM: NORMAL
MDC_IDC_SESS_TYPE: NORMAL
MDC_IDC_SET_BRADY_AT_MODE_SWITCH_MODE: NORMAL
MDC_IDC_SET_BRADY_AT_MODE_SWITCH_RATE: 150 {BEATS}/MIN
MDC_IDC_SET_BRADY_LOWRATE: 60 {BEATS}/MIN
MDC_IDC_SET_BRADY_MAX_SENSOR_RATE: 130 {BEATS}/MIN
MDC_IDC_SET_BRADY_MAX_TRACKING_RATE: 130 {BEATS}/MIN
MDC_IDC_SET_BRADY_MODE: NORMAL
MDC_IDC_SET_BRADY_PAV_DELAY_HIGH: 200 MS
MDC_IDC_SET_BRADY_PAV_DELAY_LOW: 250 MS
MDC_IDC_SET_BRADY_SAV_DELAY_HIGH: 200 MS
MDC_IDC_SET_BRADY_SAV_DELAY_LOW: 250 MS
MDC_IDC_SET_LEADCHNL_RA_PACING_AMPLITUDE: 1.5 V
MDC_IDC_SET_LEADCHNL_RA_PACING_POLARITY: NORMAL
MDC_IDC_SET_LEADCHNL_RA_PACING_PULSEWIDTH: 0.5 MS
MDC_IDC_SET_LEADCHNL_RA_SENSING_ADAPTATION_MODE: NORMAL
MDC_IDC_SET_LEADCHNL_RA_SENSING_POLARITY: NORMAL
MDC_IDC_SET_LEADCHNL_RA_SENSING_SENSITIVITY: 0.25 MV
MDC_IDC_SET_LEADCHNL_RV_PACING_AMPLITUDE: NORMAL
MDC_IDC_SET_LEADCHNL_RV_PACING_CAPTURE_MODE: NORMAL
MDC_IDC_SET_LEADCHNL_RV_PACING_POLARITY: NORMAL
MDC_IDC_SET_LEADCHNL_RV_PACING_PULSEWIDTH: 0.4 MS
MDC_IDC_SET_LEADCHNL_RV_SENSING_ADAPTATION_MODE: NORMAL
MDC_IDC_SET_LEADCHNL_RV_SENSING_POLARITY: NORMAL
MDC_IDC_SET_LEADCHNL_RV_SENSING_SENSITIVITY: 0.6 MV
MDC_IDC_SET_ZONE_DETECTION_INTERVAL: 333 MS
MDC_IDC_SET_ZONE_TYPE: NORMAL
MDC_IDC_SET_ZONE_VENDOR_TYPE: NORMAL
MDC_IDC_STAT_AT_BURDEN_PERCENT: 1 %
MDC_IDC_STAT_AT_DTM_END: NORMAL
MDC_IDC_STAT_AT_DTM_START: NORMAL
MDC_IDC_STAT_AT_MODE_SW_COUNT: 3
MDC_IDC_STAT_BRADY_DTM_END: NORMAL
MDC_IDC_STAT_BRADY_DTM_START: NORMAL
MDC_IDC_STAT_BRADY_RA_PERCENT_PACED: 22 %
MDC_IDC_STAT_BRADY_RV_PERCENT_PACED: 0 %
MDC_IDC_STAT_EPISODE_RECENT_COUNT: 0
MDC_IDC_STAT_EPISODE_RECENT_COUNT: 0
MDC_IDC_STAT_EPISODE_RECENT_COUNT: 1
MDC_IDC_STAT_EPISODE_RECENT_COUNT: 3
MDC_IDC_STAT_EPISODE_RECENT_COUNT_DTM_END: NORMAL
MDC_IDC_STAT_EPISODE_RECENT_COUNT_DTM_START: NORMAL
MDC_IDC_STAT_EPISODE_TYPE: NORMAL
MDC_IDC_STAT_EPISODE_VENDOR_TYPE: NORMAL

## 2023-03-15 ENCOUNTER — TELEPHONE (OUTPATIENT)
Dept: CARDIOLOGY | Facility: CLINIC | Age: 73
End: 2023-03-15
Payer: COMMERCIAL

## 2023-03-15 NOTE — TELEPHONE ENCOUNTER
"3/15/23: Took call from patient who explained that when he was helping his son do some sandblasting yesterday, he got shocked about 5 times from the sandblaster.  He sent in a remote transmission and I called him to discuss the results.  All measurements were fine. No episodes from yesterday. Patient had 1 5 beat run of NSVT, rate 158 bpm on 3/9/23 at 15:01- asymptomatic, but no other episodes or alerts.  He said \"don't worry, I won't be using that sandblaster again!!\"  Soni Hernandez, Device RN  "

## 2023-05-09 ENCOUNTER — ANCILLARY PROCEDURE (OUTPATIENT)
Dept: CARDIOLOGY | Facility: CLINIC | Age: 73
End: 2023-05-09
Attending: INTERNAL MEDICINE
Payer: COMMERCIAL

## 2023-05-09 DIAGNOSIS — Z95.0 PACEMAKER: ICD-10-CM

## 2023-05-09 DIAGNOSIS — I49.5 SICK SINUS SYNDROME (H): ICD-10-CM

## 2023-05-09 LAB
MDC_IDC_EPISODE_DTM: NORMAL
MDC_IDC_EPISODE_DTM: NORMAL
MDC_IDC_EPISODE_ID: NORMAL
MDC_IDC_EPISODE_ID: NORMAL
MDC_IDC_EPISODE_TYPE: NORMAL
MDC_IDC_EPISODE_TYPE: NORMAL
MDC_IDC_LEAD_IMPLANT_DT: NORMAL
MDC_IDC_LEAD_IMPLANT_DT: NORMAL
MDC_IDC_LEAD_LOCATION: NORMAL
MDC_IDC_LEAD_LOCATION: NORMAL
MDC_IDC_LEAD_LOCATION_DETAIL_1: NORMAL
MDC_IDC_LEAD_LOCATION_DETAIL_1: NORMAL
MDC_IDC_LEAD_MFG: NORMAL
MDC_IDC_LEAD_MFG: NORMAL
MDC_IDC_LEAD_MODEL: NORMAL
MDC_IDC_LEAD_MODEL: NORMAL
MDC_IDC_LEAD_POLARITY_TYPE: NORMAL
MDC_IDC_LEAD_POLARITY_TYPE: NORMAL
MDC_IDC_LEAD_SERIAL: NORMAL
MDC_IDC_LEAD_SERIAL: NORMAL
MDC_IDC_MSMT_BATTERY_DTM: NORMAL
MDC_IDC_MSMT_BATTERY_REMAINING_LONGEVITY: 36 MO
MDC_IDC_MSMT_BATTERY_REMAINING_PERCENTAGE: 57 %
MDC_IDC_MSMT_BATTERY_STATUS: NORMAL
MDC_IDC_MSMT_LEADCHNL_RA_IMPEDANCE_VALUE: 535 OHM
MDC_IDC_MSMT_LEADCHNL_RA_PACING_THRESHOLD_AMPLITUDE: 0.6 V
MDC_IDC_MSMT_LEADCHNL_RA_PACING_THRESHOLD_PULSEWIDTH: 0.5 MS
MDC_IDC_MSMT_LEADCHNL_RV_IMPEDANCE_VALUE: 647 OHM
MDC_IDC_MSMT_LEADCHNL_RV_PACING_THRESHOLD_AMPLITUDE: 0.7 V
MDC_IDC_MSMT_LEADCHNL_RV_PACING_THRESHOLD_PULSEWIDTH: 0.4 MS
MDC_IDC_PG_IMPLANT_DTM: NORMAL
MDC_IDC_PG_MFG: NORMAL
MDC_IDC_PG_MODEL: NORMAL
MDC_IDC_PG_SERIAL: NORMAL
MDC_IDC_PG_TYPE: NORMAL
MDC_IDC_SESS_CLINIC_NAME: NORMAL
MDC_IDC_SESS_DTM: NORMAL
MDC_IDC_SESS_TYPE: NORMAL
MDC_IDC_SET_BRADY_AT_MODE_SWITCH_MODE: NORMAL
MDC_IDC_SET_BRADY_AT_MODE_SWITCH_RATE: 150 {BEATS}/MIN
MDC_IDC_SET_BRADY_LOWRATE: 60 {BEATS}/MIN
MDC_IDC_SET_BRADY_MAX_SENSOR_RATE: 130 {BEATS}/MIN
MDC_IDC_SET_BRADY_MAX_TRACKING_RATE: 130 {BEATS}/MIN
MDC_IDC_SET_BRADY_MODE: NORMAL
MDC_IDC_SET_BRADY_PAV_DELAY_HIGH: 200 MS
MDC_IDC_SET_BRADY_PAV_DELAY_LOW: 250 MS
MDC_IDC_SET_BRADY_SAV_DELAY_HIGH: 200 MS
MDC_IDC_SET_BRADY_SAV_DELAY_LOW: 250 MS
MDC_IDC_SET_LEADCHNL_RA_PACING_AMPLITUDE: 1.5 V
MDC_IDC_SET_LEADCHNL_RA_PACING_POLARITY: NORMAL
MDC_IDC_SET_LEADCHNL_RA_PACING_PULSEWIDTH: 0.5 MS
MDC_IDC_SET_LEADCHNL_RA_SENSING_ADAPTATION_MODE: NORMAL
MDC_IDC_SET_LEADCHNL_RA_SENSING_POLARITY: NORMAL
MDC_IDC_SET_LEADCHNL_RA_SENSING_SENSITIVITY: 0.25 MV
MDC_IDC_SET_LEADCHNL_RV_PACING_AMPLITUDE: 1.2 V
MDC_IDC_SET_LEADCHNL_RV_PACING_CAPTURE_MODE: NORMAL
MDC_IDC_SET_LEADCHNL_RV_PACING_POLARITY: NORMAL
MDC_IDC_SET_LEADCHNL_RV_PACING_PULSEWIDTH: 0.4 MS
MDC_IDC_SET_LEADCHNL_RV_SENSING_ADAPTATION_MODE: NORMAL
MDC_IDC_SET_LEADCHNL_RV_SENSING_POLARITY: NORMAL
MDC_IDC_SET_LEADCHNL_RV_SENSING_SENSITIVITY: 0.6 MV
MDC_IDC_SET_ZONE_DETECTION_INTERVAL: 333 MS
MDC_IDC_SET_ZONE_TYPE: NORMAL
MDC_IDC_SET_ZONE_VENDOR_TYPE: NORMAL
MDC_IDC_STAT_AT_BURDEN_PERCENT: 0 %
MDC_IDC_STAT_AT_DTM_END: NORMAL
MDC_IDC_STAT_AT_DTM_START: NORMAL
MDC_IDC_STAT_BRADY_DTM_END: NORMAL
MDC_IDC_STAT_BRADY_DTM_START: NORMAL
MDC_IDC_STAT_BRADY_RA_PERCENT_PACED: 26 %
MDC_IDC_STAT_BRADY_RV_PERCENT_PACED: 0 %
MDC_IDC_STAT_EPISODE_RECENT_COUNT: 0
MDC_IDC_STAT_EPISODE_RECENT_COUNT_DTM_END: NORMAL
MDC_IDC_STAT_EPISODE_RECENT_COUNT_DTM_START: NORMAL
MDC_IDC_STAT_EPISODE_TYPE: NORMAL
MDC_IDC_STAT_EPISODE_VENDOR_TYPE: NORMAL

## 2023-05-09 PROCEDURE — 93294 REM INTERROG EVL PM/LDLS PM: CPT | Performed by: INTERNAL MEDICINE

## 2023-05-09 PROCEDURE — 93296 REM INTERROG EVL PM/IDS: CPT | Performed by: INTERNAL MEDICINE

## 2023-05-13 ENCOUNTER — HEALTH MAINTENANCE LETTER (OUTPATIENT)
Age: 73
End: 2023-05-13

## 2023-08-05 ENCOUNTER — HEALTH MAINTENANCE LETTER (OUTPATIENT)
Age: 73
End: 2023-08-05

## 2023-08-17 ENCOUNTER — ANCILLARY PROCEDURE (OUTPATIENT)
Dept: CARDIOLOGY | Facility: CLINIC | Age: 73
End: 2023-08-17
Attending: INTERNAL MEDICINE
Payer: COMMERCIAL

## 2023-08-17 DIAGNOSIS — Z95.0 PACEMAKER: ICD-10-CM

## 2023-08-17 DIAGNOSIS — I49.5 SICK SINUS SYNDROME (H): ICD-10-CM

## 2023-08-17 DIAGNOSIS — I45.5 SINUS ARREST: ICD-10-CM

## 2023-08-24 LAB
MDC_IDC_EPISODE_DTM: NORMAL
MDC_IDC_EPISODE_DURATION: 1 S
MDC_IDC_EPISODE_DURATION: 16 S
MDC_IDC_EPISODE_DURATION: 4 S
MDC_IDC_EPISODE_ID: NORMAL
MDC_IDC_EPISODE_TYPE: NORMAL
MDC_IDC_LEAD_IMPLANT_DT: NORMAL
MDC_IDC_LEAD_IMPLANT_DT: NORMAL
MDC_IDC_LEAD_LOCATION: NORMAL
MDC_IDC_LEAD_LOCATION: NORMAL
MDC_IDC_LEAD_LOCATION_DETAIL_1: NORMAL
MDC_IDC_LEAD_LOCATION_DETAIL_1: NORMAL
MDC_IDC_LEAD_MFG: NORMAL
MDC_IDC_LEAD_MFG: NORMAL
MDC_IDC_LEAD_MODEL: NORMAL
MDC_IDC_LEAD_MODEL: NORMAL
MDC_IDC_LEAD_POLARITY_TYPE: NORMAL
MDC_IDC_LEAD_POLARITY_TYPE: NORMAL
MDC_IDC_LEAD_SERIAL: NORMAL
MDC_IDC_LEAD_SERIAL: NORMAL
MDC_IDC_MSMT_BATTERY_DTM: NORMAL
MDC_IDC_MSMT_BATTERY_REMAINING_LONGEVITY: 36 MO
MDC_IDC_MSMT_BATTERY_REMAINING_PERCENTAGE: 55 %
MDC_IDC_MSMT_BATTERY_STATUS: NORMAL
MDC_IDC_MSMT_LEADCHNL_RA_IMPEDANCE_VALUE: 489 OHM
MDC_IDC_MSMT_LEADCHNL_RA_PACING_THRESHOLD_AMPLITUDE: 0.6 V
MDC_IDC_MSMT_LEADCHNL_RA_PACING_THRESHOLD_PULSEWIDTH: 0.5 MS
MDC_IDC_MSMT_LEADCHNL_RV_IMPEDANCE_VALUE: 617 OHM
MDC_IDC_MSMT_LEADCHNL_RV_PACING_THRESHOLD_AMPLITUDE: 0.6 V
MDC_IDC_MSMT_LEADCHNL_RV_PACING_THRESHOLD_PULSEWIDTH: 0.4 MS
MDC_IDC_PG_IMPLANT_DTM: NORMAL
MDC_IDC_PG_MFG: NORMAL
MDC_IDC_PG_MODEL: NORMAL
MDC_IDC_PG_SERIAL: NORMAL
MDC_IDC_PG_TYPE: NORMAL
MDC_IDC_SESS_CLINIC_NAME: NORMAL
MDC_IDC_SESS_DTM: NORMAL
MDC_IDC_SESS_TYPE: NORMAL
MDC_IDC_SET_BRADY_AT_MODE_SWITCH_MODE: NORMAL
MDC_IDC_SET_BRADY_AT_MODE_SWITCH_RATE: 150 {BEATS}/MIN
MDC_IDC_SET_BRADY_LOWRATE: 60 {BEATS}/MIN
MDC_IDC_SET_BRADY_MAX_SENSOR_RATE: 130 {BEATS}/MIN
MDC_IDC_SET_BRADY_MAX_TRACKING_RATE: 130 {BEATS}/MIN
MDC_IDC_SET_BRADY_MODE: NORMAL
MDC_IDC_SET_BRADY_PAV_DELAY_HIGH: 200 MS
MDC_IDC_SET_BRADY_PAV_DELAY_LOW: 250 MS
MDC_IDC_SET_BRADY_SAV_DELAY_HIGH: 200 MS
MDC_IDC_SET_BRADY_SAV_DELAY_LOW: 250 MS
MDC_IDC_SET_LEADCHNL_RA_PACING_AMPLITUDE: 1.5 V
MDC_IDC_SET_LEADCHNL_RA_PACING_POLARITY: NORMAL
MDC_IDC_SET_LEADCHNL_RA_PACING_PULSEWIDTH: 0.5 MS
MDC_IDC_SET_LEADCHNL_RA_SENSING_ADAPTATION_MODE: NORMAL
MDC_IDC_SET_LEADCHNL_RA_SENSING_POLARITY: NORMAL
MDC_IDC_SET_LEADCHNL_RA_SENSING_SENSITIVITY: 0.25 MV
MDC_IDC_SET_LEADCHNL_RV_PACING_AMPLITUDE: 1.1 V
MDC_IDC_SET_LEADCHNL_RV_PACING_CAPTURE_MODE: NORMAL
MDC_IDC_SET_LEADCHNL_RV_PACING_POLARITY: NORMAL
MDC_IDC_SET_LEADCHNL_RV_PACING_PULSEWIDTH: 0.4 MS
MDC_IDC_SET_LEADCHNL_RV_SENSING_ADAPTATION_MODE: NORMAL
MDC_IDC_SET_LEADCHNL_RV_SENSING_POLARITY: NORMAL
MDC_IDC_SET_LEADCHNL_RV_SENSING_SENSITIVITY: 0.6 MV
MDC_IDC_SET_ZONE_DETECTION_INTERVAL: 333 MS
MDC_IDC_SET_ZONE_TYPE: NORMAL
MDC_IDC_SET_ZONE_VENDOR_TYPE: NORMAL
MDC_IDC_STAT_AT_BURDEN_PERCENT: 1 %
MDC_IDC_STAT_AT_DTM_END: NORMAL
MDC_IDC_STAT_AT_DTM_START: NORMAL
MDC_IDC_STAT_BRADY_DTM_END: NORMAL
MDC_IDC_STAT_BRADY_DTM_START: NORMAL
MDC_IDC_STAT_BRADY_RA_PERCENT_PACED: 25 %
MDC_IDC_STAT_BRADY_RV_PERCENT_PACED: 0 %
MDC_IDC_STAT_EPISODE_RECENT_COUNT: 0
MDC_IDC_STAT_EPISODE_RECENT_COUNT: 0
MDC_IDC_STAT_EPISODE_RECENT_COUNT: 1
MDC_IDC_STAT_EPISODE_RECENT_COUNT: 2
MDC_IDC_STAT_EPISODE_RECENT_COUNT_DTM_END: NORMAL
MDC_IDC_STAT_EPISODE_RECENT_COUNT_DTM_START: NORMAL
MDC_IDC_STAT_EPISODE_TYPE: NORMAL
MDC_IDC_STAT_EPISODE_VENDOR_TYPE: NORMAL

## 2023-08-24 PROCEDURE — 93294 REM INTERROG EVL PM/LDLS PM: CPT | Performed by: INTERNAL MEDICINE

## 2023-08-24 PROCEDURE — 93296 REM INTERROG EVL PM/IDS: CPT | Performed by: INTERNAL MEDICINE

## 2023-11-02 DIAGNOSIS — I10 HTN (HYPERTENSION): ICD-10-CM

## 2023-11-02 RX ORDER — METOPROLOL SUCCINATE 50 MG/1
50 TABLET, EXTENDED RELEASE ORAL DAILY
Qty: 90 TABLET | Refills: 0 | Status: SHIPPED | OUTPATIENT
Start: 2023-11-02 | End: 2024-05-20

## 2023-11-16 ENCOUNTER — ANCILLARY PROCEDURE (OUTPATIENT)
Dept: CARDIOLOGY | Facility: CLINIC | Age: 73
End: 2023-11-16
Attending: INTERNAL MEDICINE
Payer: COMMERCIAL

## 2023-11-16 DIAGNOSIS — I49.5 SICK SINUS SYNDROME (H): ICD-10-CM

## 2023-11-16 DIAGNOSIS — I45.5 SINUS ARREST: ICD-10-CM

## 2023-11-16 DIAGNOSIS — Z95.0 PACEMAKER: ICD-10-CM

## 2023-11-16 LAB
MDC_IDC_EPISODE_DTM: NORMAL
MDC_IDC_EPISODE_DURATION: 2 S
MDC_IDC_EPISODE_DURATION: 2 S
MDC_IDC_EPISODE_ID: NORMAL
MDC_IDC_EPISODE_TYPE: NORMAL
MDC_IDC_LEAD_CONNECTION_STATUS: NORMAL
MDC_IDC_LEAD_CONNECTION_STATUS: NORMAL
MDC_IDC_LEAD_IMPLANT_DT: NORMAL
MDC_IDC_LEAD_IMPLANT_DT: NORMAL
MDC_IDC_LEAD_LOCATION: NORMAL
MDC_IDC_LEAD_LOCATION: NORMAL
MDC_IDC_LEAD_LOCATION_DETAIL_1: NORMAL
MDC_IDC_LEAD_LOCATION_DETAIL_1: NORMAL
MDC_IDC_LEAD_MFG: NORMAL
MDC_IDC_LEAD_MFG: NORMAL
MDC_IDC_LEAD_MODEL: NORMAL
MDC_IDC_LEAD_MODEL: NORMAL
MDC_IDC_LEAD_POLARITY_TYPE: NORMAL
MDC_IDC_LEAD_POLARITY_TYPE: NORMAL
MDC_IDC_LEAD_SERIAL: NORMAL
MDC_IDC_LEAD_SERIAL: NORMAL
MDC_IDC_MSMT_BATTERY_DTM: NORMAL
MDC_IDC_MSMT_BATTERY_REMAINING_LONGEVITY: 30 MO
MDC_IDC_MSMT_BATTERY_REMAINING_PERCENTAGE: 52 %
MDC_IDC_MSMT_BATTERY_STATUS: NORMAL
MDC_IDC_MSMT_LEADCHNL_RA_IMPEDANCE_VALUE: 501 OHM
MDC_IDC_MSMT_LEADCHNL_RA_PACING_THRESHOLD_AMPLITUDE: 0.6 V
MDC_IDC_MSMT_LEADCHNL_RA_PACING_THRESHOLD_PULSEWIDTH: 0.5 MS
MDC_IDC_MSMT_LEADCHNL_RV_IMPEDANCE_VALUE: 636 OHM
MDC_IDC_MSMT_LEADCHNL_RV_PACING_THRESHOLD_AMPLITUDE: 0.6 V
MDC_IDC_MSMT_LEADCHNL_RV_PACING_THRESHOLD_PULSEWIDTH: 0.4 MS
MDC_IDC_PG_IMPLANT_DTM: NORMAL
MDC_IDC_PG_MFG: NORMAL
MDC_IDC_PG_MODEL: NORMAL
MDC_IDC_PG_SERIAL: NORMAL
MDC_IDC_PG_TYPE: NORMAL
MDC_IDC_SESS_CLINIC_NAME: NORMAL
MDC_IDC_SESS_DTM: NORMAL
MDC_IDC_SESS_TYPE: NORMAL
MDC_IDC_SET_BRADY_AT_MODE_SWITCH_MODE: NORMAL
MDC_IDC_SET_BRADY_AT_MODE_SWITCH_RATE: 150 {BEATS}/MIN
MDC_IDC_SET_BRADY_LOWRATE: 60 {BEATS}/MIN
MDC_IDC_SET_BRADY_MAX_SENSOR_RATE: 130 {BEATS}/MIN
MDC_IDC_SET_BRADY_MAX_TRACKING_RATE: 130 {BEATS}/MIN
MDC_IDC_SET_BRADY_MODE: NORMAL
MDC_IDC_SET_BRADY_PAV_DELAY_HIGH: 200 MS
MDC_IDC_SET_BRADY_PAV_DELAY_LOW: 250 MS
MDC_IDC_SET_BRADY_SAV_DELAY_HIGH: 200 MS
MDC_IDC_SET_BRADY_SAV_DELAY_LOW: 250 MS
MDC_IDC_SET_LEADCHNL_RA_PACING_AMPLITUDE: 1.5 V
MDC_IDC_SET_LEADCHNL_RA_PACING_POLARITY: NORMAL
MDC_IDC_SET_LEADCHNL_RA_PACING_PULSEWIDTH: 0.5 MS
MDC_IDC_SET_LEADCHNL_RA_SENSING_ADAPTATION_MODE: NORMAL
MDC_IDC_SET_LEADCHNL_RA_SENSING_POLARITY: NORMAL
MDC_IDC_SET_LEADCHNL_RA_SENSING_SENSITIVITY: 0.25 MV
MDC_IDC_SET_LEADCHNL_RV_PACING_AMPLITUDE: 1.1 V
MDC_IDC_SET_LEADCHNL_RV_PACING_CAPTURE_MODE: NORMAL
MDC_IDC_SET_LEADCHNL_RV_PACING_POLARITY: NORMAL
MDC_IDC_SET_LEADCHNL_RV_PACING_PULSEWIDTH: 0.4 MS
MDC_IDC_SET_LEADCHNL_RV_SENSING_ADAPTATION_MODE: NORMAL
MDC_IDC_SET_LEADCHNL_RV_SENSING_POLARITY: NORMAL
MDC_IDC_SET_LEADCHNL_RV_SENSING_SENSITIVITY: 0.6 MV
MDC_IDC_SET_ZONE_DETECTION_INTERVAL: 333 MS
MDC_IDC_SET_ZONE_STATUS: NORMAL
MDC_IDC_SET_ZONE_TYPE: NORMAL
MDC_IDC_SET_ZONE_VENDOR_TYPE: NORMAL
MDC_IDC_STAT_AT_BURDEN_PERCENT: 1 %
MDC_IDC_STAT_AT_DTM_END: NORMAL
MDC_IDC_STAT_AT_DTM_START: NORMAL
MDC_IDC_STAT_BRADY_DTM_END: NORMAL
MDC_IDC_STAT_BRADY_DTM_START: NORMAL
MDC_IDC_STAT_BRADY_RA_PERCENT_PACED: 26 %
MDC_IDC_STAT_BRADY_RV_PERCENT_PACED: 0 %
MDC_IDC_STAT_EPISODE_RECENT_COUNT: 0
MDC_IDC_STAT_EPISODE_RECENT_COUNT: 0
MDC_IDC_STAT_EPISODE_RECENT_COUNT: 2
MDC_IDC_STAT_EPISODE_RECENT_COUNT_DTM_END: NORMAL
MDC_IDC_STAT_EPISODE_RECENT_COUNT_DTM_START: NORMAL
MDC_IDC_STAT_EPISODE_TYPE: NORMAL
MDC_IDC_STAT_EPISODE_VENDOR_TYPE: NORMAL

## 2023-11-16 PROCEDURE — 99207 CARDIAC DEVICE CHECK - REMOTE: CPT | Performed by: INTERNAL MEDICINE

## 2024-02-29 ENCOUNTER — OFFICE VISIT (OUTPATIENT)
Dept: CARDIOLOGY | Facility: CLINIC | Age: 74
End: 2024-02-29
Attending: INTERNAL MEDICINE
Payer: COMMERCIAL

## 2024-02-29 VITALS
HEART RATE: 61 BPM | WEIGHT: 213 LBS | DIASTOLIC BLOOD PRESSURE: 83 MMHG | OXYGEN SATURATION: 97 % | SYSTOLIC BLOOD PRESSURE: 146 MMHG | BODY MASS INDEX: 34.38 KG/M2

## 2024-02-29 DIAGNOSIS — I49.5 SINUS NODE DYSFUNCTION (H): Primary | ICD-10-CM

## 2024-02-29 DIAGNOSIS — Z95.0 PACEMAKER: ICD-10-CM

## 2024-02-29 DIAGNOSIS — I45.5 SINUS ARREST: ICD-10-CM

## 2024-02-29 DIAGNOSIS — I49.5 SICK SINUS SYNDROME (H): ICD-10-CM

## 2024-02-29 LAB
MDC_IDC_LEAD_CONNECTION_STATUS: NORMAL
MDC_IDC_LEAD_CONNECTION_STATUS: NORMAL
MDC_IDC_LEAD_IMPLANT_DT: NORMAL
MDC_IDC_LEAD_IMPLANT_DT: NORMAL
MDC_IDC_LEAD_LOCATION: NORMAL
MDC_IDC_LEAD_LOCATION: NORMAL
MDC_IDC_LEAD_LOCATION_DETAIL_1: NORMAL
MDC_IDC_LEAD_LOCATION_DETAIL_1: NORMAL
MDC_IDC_LEAD_MFG: NORMAL
MDC_IDC_LEAD_MFG: NORMAL
MDC_IDC_LEAD_MODEL: NORMAL
MDC_IDC_LEAD_MODEL: NORMAL
MDC_IDC_LEAD_POLARITY_TYPE: NORMAL
MDC_IDC_LEAD_POLARITY_TYPE: NORMAL
MDC_IDC_LEAD_SERIAL: NORMAL
MDC_IDC_LEAD_SERIAL: NORMAL
MDC_IDC_MSMT_BATTERY_DTM: NORMAL
MDC_IDC_MSMT_BATTERY_REMAINING_LONGEVITY: 30 MO
MDC_IDC_MSMT_BATTERY_REMAINING_PERCENTAGE: 47 %
MDC_IDC_MSMT_BATTERY_STATUS: NORMAL
MDC_IDC_MSMT_LEADCHNL_RA_IMPEDANCE_VALUE: 522 OHM
MDC_IDC_MSMT_LEADCHNL_RA_PACING_THRESHOLD_AMPLITUDE: 0.5 V
MDC_IDC_MSMT_LEADCHNL_RA_PACING_THRESHOLD_PULSEWIDTH: 0.5 MS
MDC_IDC_MSMT_LEADCHNL_RA_SENSING_INTR_AMPL: 5 MV
MDC_IDC_MSMT_LEADCHNL_RV_IMPEDANCE_VALUE: 635 OHM
MDC_IDC_MSMT_LEADCHNL_RV_PACING_THRESHOLD_AMPLITUDE: 0.5 V
MDC_IDC_MSMT_LEADCHNL_RV_PACING_THRESHOLD_PULSEWIDTH: 0.4 MS
MDC_IDC_MSMT_LEADCHNL_RV_SENSING_INTR_AMPL: 16.3 MV
MDC_IDC_PG_IMPLANT_DTM: NORMAL
MDC_IDC_PG_MFG: NORMAL
MDC_IDC_PG_MODEL: NORMAL
MDC_IDC_PG_SERIAL: NORMAL
MDC_IDC_PG_TYPE: NORMAL
MDC_IDC_SESS_CLINIC_NAME: NORMAL
MDC_IDC_SESS_DTM: NORMAL
MDC_IDC_SESS_TYPE: NORMAL
MDC_IDC_SET_BRADY_AT_MODE_SWITCH_MODE: NORMAL
MDC_IDC_SET_BRADY_AT_MODE_SWITCH_RATE: 150 {BEATS}/MIN
MDC_IDC_SET_BRADY_LOWRATE: 60 {BEATS}/MIN
MDC_IDC_SET_BRADY_MAX_SENSOR_RATE: 130 {BEATS}/MIN
MDC_IDC_SET_BRADY_MAX_TRACKING_RATE: 130 {BEATS}/MIN
MDC_IDC_SET_BRADY_MODE: NORMAL
MDC_IDC_SET_BRADY_PAV_DELAY_HIGH: 200 MS
MDC_IDC_SET_BRADY_PAV_DELAY_LOW: 250 MS
MDC_IDC_SET_BRADY_SAV_DELAY_HIGH: 200 MS
MDC_IDC_SET_BRADY_SAV_DELAY_LOW: 250 MS
MDC_IDC_SET_LEADCHNL_RA_PACING_AMPLITUDE: 1.5 V
MDC_IDC_SET_LEADCHNL_RA_PACING_POLARITY: NORMAL
MDC_IDC_SET_LEADCHNL_RA_PACING_PULSEWIDTH: 0.5 MS
MDC_IDC_SET_LEADCHNL_RA_SENSING_ADAPTATION_MODE: NORMAL
MDC_IDC_SET_LEADCHNL_RA_SENSING_POLARITY: NORMAL
MDC_IDC_SET_LEADCHNL_RA_SENSING_SENSITIVITY: 0.25 MV
MDC_IDC_SET_LEADCHNL_RV_PACING_AMPLITUDE: NORMAL
MDC_IDC_SET_LEADCHNL_RV_PACING_CAPTURE_MODE: NORMAL
MDC_IDC_SET_LEADCHNL_RV_PACING_POLARITY: NORMAL
MDC_IDC_SET_LEADCHNL_RV_PACING_PULSEWIDTH: 0.4 MS
MDC_IDC_SET_LEADCHNL_RV_SENSING_ADAPTATION_MODE: NORMAL
MDC_IDC_SET_LEADCHNL_RV_SENSING_POLARITY: NORMAL
MDC_IDC_SET_LEADCHNL_RV_SENSING_SENSITIVITY: 0.6 MV
MDC_IDC_SET_ZONE_DETECTION_INTERVAL: 333 MS
MDC_IDC_SET_ZONE_STATUS: NORMAL
MDC_IDC_SET_ZONE_TYPE: NORMAL
MDC_IDC_SET_ZONE_VENDOR_TYPE: NORMAL
MDC_IDC_STAT_AT_BURDEN_PERCENT: 1 %
MDC_IDC_STAT_AT_DTM_END: NORMAL
MDC_IDC_STAT_AT_DTM_START: NORMAL
MDC_IDC_STAT_AT_MODE_SW_COUNT: 4
MDC_IDC_STAT_BRADY_DTM_END: NORMAL
MDC_IDC_STAT_BRADY_DTM_START: NORMAL
MDC_IDC_STAT_BRADY_RA_PERCENT_PACED: 27 %
MDC_IDC_STAT_BRADY_RV_PERCENT_PACED: 0 %
MDC_IDC_STAT_EPISODE_RECENT_COUNT: 0
MDC_IDC_STAT_EPISODE_RECENT_COUNT: 2
MDC_IDC_STAT_EPISODE_RECENT_COUNT_DTM_END: NORMAL
MDC_IDC_STAT_EPISODE_RECENT_COUNT_DTM_END: NORMAL
MDC_IDC_STAT_EPISODE_RECENT_COUNT_DTM_START: NORMAL
MDC_IDC_STAT_EPISODE_RECENT_COUNT_DTM_START: NORMAL
MDC_IDC_STAT_EPISODE_TYPE: NORMAL
MDC_IDC_STAT_EPISODE_TYPE: NORMAL
MDC_IDC_STAT_EPISODE_VENDOR_TYPE: NORMAL
MDC_IDC_STAT_EPISODE_VENDOR_TYPE: NORMAL

## 2024-02-29 PROCEDURE — 93280 PM DEVICE PROGR EVAL DUAL: CPT | Performed by: INTERNAL MEDICINE

## 2024-02-29 PROCEDURE — 99214 OFFICE O/P EST MOD 30 MIN: CPT | Performed by: INTERNAL MEDICINE

## 2024-02-29 NOTE — PROGRESS NOTES
HEART CARE ENCOUNTER CONSULTATON NOTE      [facility]  [phone]      Assessment/Recommendations   Assessment:  1.  Sinus node dysfunction status post dual-chamber pacemaker placement  2.  Hypertension: Mildly elevated today normally well-controlled  3.  Diabetes mellitus type 2  4.  Dyslipidemia    Plan:  1.  Continue with remote device checks every 3 months and in person device check with follow-up visit in 1 year  2.  Continue on losartan, metoprolol, Crestor       History of Present Illness/Subjective    HPI: Dima Minor DONNY Gilliland is a 73 year old male with history of sinus node dysfunction status post dual-chamber pacemaker placement, hypertension, diabetes mellitus type 2, dyslipidemia who I am seeing today to establish care.  His previous cardiologist retired.  Device check today showed one 5 beat NSVT, brief SVT both asymptomatic.  Device functioning well.  He is very active day-to-day and still works his irrigation business.  Denies any exertional symptoms.       Physical Examination  Review of Systems   Vitals: BP (!) 146/83 (BP Location: Right arm, Patient Position: Sitting, Cuff Size: Adult Regular)   Pulse 61   Wt 96.6 kg (213 lb)   SpO2 97%   BMI 34.38 kg/m    BMI= Body mass index is 34.38 kg/m .  Wt Readings from Last 3 Encounters:   02/29/24 96.6 kg (213 lb)   02/07/23 98 kg (216 lb)   08/09/22 97.8 kg (215 lb 8 oz)       General Appearance:   no distress, normal body habitus   ENT/Mouth: membranes moist, no oral lesions or bleeding gums.      EYES:  no scleral icterus, normal conjunctivae   Neck: no carotid bruits or thyromegaly   Chest/Lungs:   lungs are clear to auscultation   Cardiovascular:   Regular. Normal first and second heart sounds with no murmur no edema bilaterally        Extremities: no cyanosis or clubbing   Skin: no xanthelasma, warm.    Neurologic: normal  bilateral, no tremors     Psychiatric: alert and oriented x3, calm        Please refer above for  cardiac ROS details.        Medical History  Surgical History Family History Social History   Past Medical History:   Diagnosis Date    Diabetes mellitus, type II (H)     High cholesterol     History of chest pain 2013    per pt thought to be over-exertion while shoveling snow    Hypertension     Sinus bradycardia seen on cardiac monitor 3/26/15    asystole 15 sce intervals    SSS (sick sinus syndrome) (H) 10/15/2015    Syncope     Echo nl, GXT neg, MRI head neg, US carotid neg     Past Surgical History:   Procedure Laterality Date    CYSTOSCOPY PROSTATE W/ LASER N/A 2014    Procedure: CYSTOSCOPY TRANSURETHRAL RESECTION PROSTATE;  Surgeon: Rocky Goyal MD;  Location: [facility];  Service:     CYSTOSCOPY W/ LITHOLAPAXY / EHL N/A 2014    Procedure: HOLMIUN LASER CYSTOLITHOLAPAXY;  Surgeon: Rocky Goyal MD;  Location: [facility];  Service:     EXCISE GANGLION WRIST      IMPLANT PACEMAKER  3/26/15    [facility]    INSERT / REPLACE / REMOVE PACEMAKER      LAPAROSCOPIC CHOLECYSTECTOMY N/A 2014    Procedure: CHOLECYSTECTOMY LAPAROSCOPIC;  Surgeon: Brady Estevez MD;  Location: [facility];  Service:     OTHER SURGICAL HISTORY  2014    Cystoscopy Laser Cystolitholapaxy     Family History   Problem Relation Age of Onset    Cancer Father     Chronic Obstructive Pulmonary Disease Father     Chronic Obstructive Pulmonary Disease Mother     Heart Failure Mother         Social History     Socioeconomic History    Marital status:      Spouse name: Not on file    Number of children: Not on file    Years of education: Not on file    Highest education level: Not on file   Occupational History    Not on file   Tobacco Use    Smoking status: Former     Packs/day: 25.00     Years: 1.00     Additional pack years: 0.00     Total pack years: 25.00     Types: Cigarettes     Quit date: 2006     Years since quittin.1    Smokeless tobacco: Never   Substance and  Sexual Activity    Alcohol use: Yes     Alcohol/week: 2.0 standard drinks of alcohol     Comment: Alcoholic Drinks/day: Not in the last month or so    Drug use: No    Sexual activity: Yes     Partners: Female   Other Topics Concern    Not on file   Social History Narrative    Not on file     Social Determinants of Health     Financial Resource Strain: Not on file   Food Insecurity: Not on file   Transportation Needs: Not on file   Physical Activity: Not on file   Stress: Not on file   Social Connections: Not on file   Interpersonal Safety: Not on file   Housing Stability: Not on file           Medications  Allergies   Current Outpatient Medications   Medication Sig Dispense Refill    ACCU-CHEK BILLY PLUS TEST STRP strips [ACCU-CHEK BILLY PLUS TEST STRP STRIPS] TEST TWICE DAILY 200 strip 2    ferrous gluconate (FERGON) 324 (38 Fe) MG tablet ferrous gluconate 324 mg (38 mg iron) tablet   TAKE ONE TABLET BY MOUTH TWICE DAILY      generic lancets [GENERIC LANCETS] Check sugars once daily 2 each 6    losartan (COZAAR) 25 MG tablet [LOSARTAN (COZAAR) 25 MG TABLET] Take 1 tablet (25 mg total) by mouth daily. 90 tablet 3    metFORMIN (GLUCOPHAGE) 1000 MG tablet [METFORMIN (GLUCOPHAGE) 1000 MG TABLET] Take 1 tablet (1,000 mg total) by mouth 2 (two) times a day. 180 tablet 3    metoprolol succinate ER (TOPROL XL) 50 MG 24 hr tablet Take 1 tablet (50 mg) by mouth daily 90 tablet 0    nitroglycerin (NITROSTAT) 0.4 MG SL tablet [NITROGLYCERIN (NITROSTAT) 0.4 MG SL TABLET] Place 1 tablet (0.4 mg total) under the tongue every 5 (five) minutes as needed for chest pain. 25 tablet 1    omeprazole (PRILOSEC) 20 MG capsule [OMEPRAZOLE (PRILOSEC) 20 MG CAPSULE] Take 20 mg by mouth daily before breakfast.      rosuvastatin (CRESTOR) 10 MG tablet [ROSUVASTATIN (CRESTOR) 10 MG TABLET] Take 1 tablet (10 mg total) by mouth at bedtime. 90 tablet 3    tamsulosin (FLOMAX) 0.4 MG capsule as needed       No Known Allergies       Lab Results   "  Chemistry/lipid CBC Cardiac Enzymes/BNP/TSH/INR   Recent Labs   Lab Test 10/26/18  1037   CHOL 106   HDL 46   LDL 45   TRIG 73     Recent Labs   Lab Test 10/26/18  1037 07/18/18  1441 08/31/16  0743   LDL 45 49 48     Recent Labs   Lab Test 04/20/20  1522      POTASSIUM 4.9   CHLORIDE 107   CO2 22      BUN 20   CR 1.38*   GFRESTIMATED 51*   LEMUEL 8.9     Recent Labs   Lab Test 04/20/20  1522 09/27/19  0914 07/24/19  1620   CR 1.38* 1.32* 1.75*     Recent Labs   Lab Test 06/27/19  1433 02/26/19  0859 10/26/18  1037   A1C 6.4* 6.0 6.2*          Recent Labs   Lab Test 04/20/20  1522   WBC 7.1   HGB 12.9*   HCT 39.4*   MCV 86        Recent Labs   Lab Test 04/20/20  1522 07/24/19  1620 10/26/18  1037   HGB 12.9* 13.3* 13.0*    Recent Labs   Lab Test 12/21/20  0840 04/20/20  1522 07/24/19  1620   TROPONINI <0.01 <0.01 <0.01     Recent Labs   Lab Test 04/20/20  1522   BNP 19     No results for input(s): \"TSH\" in the last 94321 hours.  No results for input(s): \"INR\" in the last 72833 hours.     Nichole Abbasi MD                                      "

## 2024-02-29 NOTE — LETTER
2/29/2024    Benny Li MD  [address] [place] [address]    RE: Dima Gilliland       Dear Colleague,     I had the pleasure of seeing Dima Gilliland in the [facility].    HEART CARE ENCOUNTER CONSULTATON NOTE      [facility]  [phone]      Assessment/Recommendations   Assessment:  1.  Sinus node dysfunction status post dual-chamber pacemaker placement  2.  Hypertension: Mildly elevated today normally well-controlled  3.  Diabetes mellitus type 2  4.  Dyslipidemia    Plan:  1.  Continue with remote device checks every 3 months and in person device check with follow-up visit in 1 year  2.  Continue on losartan, metoprolol, Crestor       History of Present Illness/Subjective    HPI: Dima Gilliland is a 73 year old male with history of sinus node dysfunction status post dual-chamber pacemaker placement, hypertension, diabetes mellitus type 2, dyslipidemia who I am seeing today to establish care.  His previous cardiologist retired.  Device check today showed one 5 beat NSVT, brief SVT both asymptomatic.  Device functioning well.  He is very active day-to-day and still works his irrigation business.  Denies any exertional symptoms.       Physical Examination  Review of Systems   Vitals: BP (!) 146/83 (BP Location: Right arm, Patient Position: Sitting, Cuff Size: Adult Regular)   Pulse 61   Wt 96.6 kg (213 lb)   SpO2 97%   BMI 34.38 kg/m    BMI= Body mass index is 34.38 kg/m .  Wt Readings from Last 3 Encounters:   02/29/24 96.6 kg (213 lb)   02/07/23 98 kg (216 lb)   08/09/22 97.8 kg (215 lb 8 oz)       General Appearance:   no distress, normal body habitus   ENT/Mouth: membranes moist, no oral lesions or bleeding gums.      EYES:  no scleral icterus, normal conjunctivae   Neck: no carotid bruits or thyromegaly   Chest/Lungs:   lungs are clear to auscultation   Cardiovascular:   Regular. Normal first and second heart sounds with no murmur no  edema bilaterally        Extremities: no cyanosis or clubbing   Skin: no xanthelasma, warm.    Neurologic: normal  bilateral, no tremors     Psychiatric: alert and oriented x3, calm        Please refer above for cardiac ROS details.        Medical History  Surgical History Family History Social History   Past Medical History:   Diagnosis Date    Diabetes mellitus, type II (H)     High cholesterol     History of chest pain 1/2013    per pt thought to be over-exertion while shoveling snow    Hypertension     Sinus bradycardia seen on cardiac monitor 3/26/15    asystole 15 sce intervals    SSS (sick sinus syndrome) (H) 10/15/2015    Syncope     Echo nl, GXT neg, MRI head neg, US carotid neg     Past Surgical History:   Procedure Laterality Date    CYSTOSCOPY PROSTATE W/ LASER N/A 9/9/2014    Procedure: CYSTOSCOPY TRANSURETHRAL RESECTION PROSTATE;  Surgeon: Rocky Goyal MD;  Location: [facility];  Service:     CYSTOSCOPY W/ LITHOLAPAXY / EHL N/A 8/26/2014    Procedure: HOLMIUN LASER CYSTOLITHOLAPAXY;  Surgeon: Rocky Goyal MD;  Location: [facility];  Service:     EXCISE GANGLION WRIST      IMPLANT PACEMAKER  3/26/15    [facility]    INSERT / REPLACE / REMOVE PACEMAKER  2015    LAPAROSCOPIC CHOLECYSTECTOMY N/A 7/23/2014    Procedure: CHOLECYSTECTOMY LAPAROSCOPIC;  Surgeon: Brady Estevez MD;  Location: [facility];  Service:     OTHER SURGICAL HISTORY  08/26/2014    Cystoscopy Laser Cystolitholapaxy     Family History   Problem Relation Age of Onset    Cancer Father     Chronic Obstructive Pulmonary Disease Father     Chronic Obstructive Pulmonary Disease Mother     Heart Failure Mother         Social History     Socioeconomic History    Marital status:      Spouse name: Not on file    Number of children: Not on file    Years of education: Not on file    Highest education level: Not on file   Occupational History    Not on file   Tobacco Use    Smoking status: Former      Packs/day: 25.00     Years: 1.00     Additional pack years: 0.00     Total pack years: 25.00     Types: Cigarettes     Quit date: 2006     Years since quittin.1    Smokeless tobacco: Never   Substance and Sexual Activity    Alcohol use: Yes     Alcohol/week: 2.0 standard drinks of alcohol     Comment: Alcoholic Drinks/day: Not in the last month or so    Drug use: No    Sexual activity: Yes     Partners: Female   Other Topics Concern    Not on file   Social History Narrative    Not on file     Social Determinants of Health     Financial Resource Strain: Not on file   Food Insecurity: Not on file   Transportation Needs: Not on file   Physical Activity: Not on file   Stress: Not on file   Social Connections: Not on file   Interpersonal Safety: Not on file   Housing Stability: Not on file           Medications  Allergies   Current Outpatient Medications   Medication Sig Dispense Refill    ACCU-CHEK BILLY PLUS TEST STRP strips [ACCU-CHEK BILLY PLUS TEST STRP STRIPS] TEST TWICE DAILY 200 strip 2    ferrous gluconate (FERGON) 324 (38 Fe) MG tablet ferrous gluconate 324 mg (38 mg iron) tablet   TAKE ONE TABLET BY MOUTH TWICE DAILY      generic lancets [GENERIC LANCETS] Check sugars once daily 2 each 6    losartan (COZAAR) 25 MG tablet [LOSARTAN (COZAAR) 25 MG TABLET] Take 1 tablet (25 mg total) by mouth daily. 90 tablet 3    metFORMIN (GLUCOPHAGE) 1000 MG tablet [METFORMIN (GLUCOPHAGE) 1000 MG TABLET] Take 1 tablet (1,000 mg total) by mouth 2 (two) times a day. 180 tablet 3    metoprolol succinate ER (TOPROL XL) 50 MG 24 hr tablet Take 1 tablet (50 mg) by mouth daily 90 tablet 0    nitroglycerin (NITROSTAT) 0.4 MG SL tablet [NITROGLYCERIN (NITROSTAT) 0.4 MG SL TABLET] Place 1 tablet (0.4 mg total) under the tongue every 5 (five) minutes as needed for chest pain. 25 tablet 1    omeprazole (PRILOSEC) 20 MG capsule [OMEPRAZOLE (PRILOSEC) 20 MG CAPSULE] Take 20 mg by mouth daily before breakfast.      rosuvastatin  "(CRESTOR) 10 MG tablet [ROSUVASTATIN (CRESTOR) 10 MG TABLET] Take 1 tablet (10 mg total) by mouth at bedtime. 90 tablet 3    tamsulosin (FLOMAX) 0.4 MG capsule as needed       No Known Allergies       Lab Results    Chemistry/lipid CBC Cardiac Enzymes/BNP/TSH/INR   Recent Labs   Lab Test 10/26/18  1037   CHOL 106   HDL 46   LDL 45   TRIG 73     Recent Labs   Lab Test 10/26/18  1037 07/18/18  1441 08/31/16  0743   LDL 45 49 48     Recent Labs   Lab Test 04/20/20  1522      POTASSIUM 4.9   CHLORIDE 107   CO2 22      BUN 20   CR 1.38*   GFRESTIMATED 51*   LEMUEL 8.9     Recent Labs   Lab Test 04/20/20  1522 09/27/19  0914 07/24/19  1620   CR 1.38* 1.32* 1.75*     Recent Labs   Lab Test 06/27/19  1433 02/26/19  0859 10/26/18  1037   A1C 6.4* 6.0 6.2*          Recent Labs   Lab Test 04/20/20  1522   WBC 7.1   HGB 12.9*   HCT 39.4*   MCV 86        Recent Labs   Lab Test 04/20/20  1522 07/24/19  1620 10/26/18  1037   HGB 12.9* 13.3* 13.0*    Recent Labs   Lab Test 12/21/20  0840 04/20/20  1522 07/24/19  1620   TROPONINI <0.01 <0.01 <0.01     Recent Labs   Lab Test 04/20/20  1522   BNP 19     No results for input(s): \"TSH\" in the last 77946 hours.  No results for input(s): \"INR\" in the last 21590 hours.     Nichole Abbasi MD          Thank you for allowing me to participate in the care of your patient.      Sincerely,     Nichole Abbasi MD     [facility]  cc:   Gonzalez Serrano MD  [address] [facility] [address]  [place] [address]      "

## 2024-03-02 ENCOUNTER — HEALTH MAINTENANCE LETTER (OUTPATIENT)
Age: 74
End: 2024-03-02

## 2024-05-19 DIAGNOSIS — I10 HTN (HYPERTENSION): ICD-10-CM

## 2024-05-20 RX ORDER — METOPROLOL SUCCINATE 50 MG/1
50 TABLET, EXTENDED RELEASE ORAL DAILY
Qty: 90 TABLET | Refills: 2 | Status: SHIPPED | OUTPATIENT
Start: 2024-05-20

## 2024-05-29 ENCOUNTER — ANCILLARY PROCEDURE (OUTPATIENT)
Dept: CARDIOLOGY | Facility: CLINIC | Age: 74
End: 2024-05-29
Attending: INTERNAL MEDICINE
Payer: COMMERCIAL

## 2024-05-29 DIAGNOSIS — Z95.0 CARDIAC PACEMAKER IN SITU: ICD-10-CM

## 2024-05-29 DIAGNOSIS — I49.5 SICK SINUS SYNDROME (H): ICD-10-CM

## 2024-05-29 LAB
MDC_IDC_EPISODE_DTM: NORMAL
MDC_IDC_EPISODE_DURATION: 1 S
MDC_IDC_EPISODE_DURATION: 2 S
MDC_IDC_EPISODE_DURATION: 7 S
MDC_IDC_EPISODE_ID: NORMAL
MDC_IDC_EPISODE_TYPE: NORMAL
MDC_IDC_EPISODE_TYPE_INDUCED: NO
MDC_IDC_LEAD_CONNECTION_STATUS: NORMAL
MDC_IDC_LEAD_CONNECTION_STATUS: NORMAL
MDC_IDC_LEAD_IMPLANT_DT: NORMAL
MDC_IDC_LEAD_IMPLANT_DT: NORMAL
MDC_IDC_LEAD_LOCATION: NORMAL
MDC_IDC_LEAD_LOCATION: NORMAL
MDC_IDC_LEAD_LOCATION_DETAIL_1: NORMAL
MDC_IDC_LEAD_LOCATION_DETAIL_1: NORMAL
MDC_IDC_LEAD_MFG: NORMAL
MDC_IDC_LEAD_MFG: NORMAL
MDC_IDC_LEAD_MODEL: NORMAL
MDC_IDC_LEAD_MODEL: NORMAL
MDC_IDC_LEAD_POLARITY_TYPE: NORMAL
MDC_IDC_LEAD_POLARITY_TYPE: NORMAL
MDC_IDC_LEAD_SERIAL: NORMAL
MDC_IDC_LEAD_SERIAL: NORMAL
MDC_IDC_MSMT_BATTERY_DTM: NORMAL
MDC_IDC_MSMT_BATTERY_REMAINING_LONGEVITY: 24 MO
MDC_IDC_MSMT_BATTERY_REMAINING_PERCENTAGE: 41 %
MDC_IDC_MSMT_BATTERY_STATUS: NORMAL
MDC_IDC_MSMT_LEADCHNL_RA_IMPEDANCE_VALUE: 475 OHM
MDC_IDC_MSMT_LEADCHNL_RA_PACING_THRESHOLD_AMPLITUDE: 0.5 V
MDC_IDC_MSMT_LEADCHNL_RA_PACING_THRESHOLD_PULSEWIDTH: 0.5 MS
MDC_IDC_MSMT_LEADCHNL_RV_IMPEDANCE_VALUE: 613 OHM
MDC_IDC_MSMT_LEADCHNL_RV_PACING_THRESHOLD_AMPLITUDE: 0.7 V
MDC_IDC_MSMT_LEADCHNL_RV_PACING_THRESHOLD_PULSEWIDTH: 0.4 MS
MDC_IDC_PG_IMPLANT_DTM: NORMAL
MDC_IDC_PG_MFG: NORMAL
MDC_IDC_PG_MODEL: NORMAL
MDC_IDC_PG_SERIAL: NORMAL
MDC_IDC_PG_TYPE: NORMAL
MDC_IDC_SESS_CLINIC_NAME: NORMAL
MDC_IDC_SESS_DTM: NORMAL
MDC_IDC_SESS_TYPE: NORMAL
MDC_IDC_SET_BRADY_AT_MODE_SWITCH_MODE: NORMAL
MDC_IDC_SET_BRADY_AT_MODE_SWITCH_RATE: 150 {BEATS}/MIN
MDC_IDC_SET_BRADY_LOWRATE: 60 {BEATS}/MIN
MDC_IDC_SET_BRADY_MAX_SENSOR_RATE: 130 {BEATS}/MIN
MDC_IDC_SET_BRADY_MAX_TRACKING_RATE: 130 {BEATS}/MIN
MDC_IDC_SET_BRADY_MODE: NORMAL
MDC_IDC_SET_BRADY_PAV_DELAY_HIGH: 200 MS
MDC_IDC_SET_BRADY_PAV_DELAY_LOW: 250 MS
MDC_IDC_SET_BRADY_SAV_DELAY_HIGH: 200 MS
MDC_IDC_SET_BRADY_SAV_DELAY_LOW: 250 MS
MDC_IDC_SET_LEADCHNL_RA_PACING_AMPLITUDE: 1.5 V
MDC_IDC_SET_LEADCHNL_RA_PACING_POLARITY: NORMAL
MDC_IDC_SET_LEADCHNL_RA_PACING_PULSEWIDTH: 0.5 MS
MDC_IDC_SET_LEADCHNL_RA_SENSING_ADAPTATION_MODE: NORMAL
MDC_IDC_SET_LEADCHNL_RA_SENSING_POLARITY: NORMAL
MDC_IDC_SET_LEADCHNL_RA_SENSING_SENSITIVITY: 0.25 MV
MDC_IDC_SET_LEADCHNL_RV_PACING_AMPLITUDE: 1.2 V
MDC_IDC_SET_LEADCHNL_RV_PACING_CAPTURE_MODE: NORMAL
MDC_IDC_SET_LEADCHNL_RV_PACING_POLARITY: NORMAL
MDC_IDC_SET_LEADCHNL_RV_PACING_PULSEWIDTH: 0.4 MS
MDC_IDC_SET_LEADCHNL_RV_SENSING_ADAPTATION_MODE: NORMAL
MDC_IDC_SET_LEADCHNL_RV_SENSING_POLARITY: NORMAL
MDC_IDC_SET_LEADCHNL_RV_SENSING_SENSITIVITY: 0.6 MV
MDC_IDC_SET_ZONE_DETECTION_INTERVAL: 333 MS
MDC_IDC_SET_ZONE_STATUS: NORMAL
MDC_IDC_SET_ZONE_TYPE: NORMAL
MDC_IDC_SET_ZONE_VENDOR_TYPE: NORMAL
MDC_IDC_STAT_AT_BURDEN_PERCENT: 1 %
MDC_IDC_STAT_AT_DTM_END: NORMAL
MDC_IDC_STAT_AT_DTM_START: NORMAL
MDC_IDC_STAT_BRADY_DTM_END: NORMAL
MDC_IDC_STAT_BRADY_DTM_START: NORMAL
MDC_IDC_STAT_BRADY_RA_PERCENT_PACED: 33 %
MDC_IDC_STAT_BRADY_RV_PERCENT_PACED: 0 %
MDC_IDC_STAT_EPISODE_RECENT_COUNT: 0
MDC_IDC_STAT_EPISODE_RECENT_COUNT: 1
MDC_IDC_STAT_EPISODE_RECENT_COUNT: 2
MDC_IDC_STAT_EPISODE_RECENT_COUNT_DTM_END: NORMAL
MDC_IDC_STAT_EPISODE_RECENT_COUNT_DTM_START: NORMAL
MDC_IDC_STAT_EPISODE_TYPE: NORMAL
MDC_IDC_STAT_EPISODE_VENDOR_TYPE: NORMAL

## 2024-05-29 PROCEDURE — 93294 REM INTERROG EVL PM/LDLS PM: CPT | Performed by: INTERNAL MEDICINE

## 2024-05-29 PROCEDURE — 93296 REM INTERROG EVL PM/IDS: CPT | Performed by: INTERNAL MEDICINE

## 2024-09-11 ENCOUNTER — ANCILLARY PROCEDURE (OUTPATIENT)
Dept: CARDIOLOGY | Facility: CLINIC | Age: 74
End: 2024-09-11
Attending: INTERNAL MEDICINE
Payer: COMMERCIAL

## 2024-09-11 DIAGNOSIS — I45.5 SINUS ARREST: ICD-10-CM

## 2024-09-11 DIAGNOSIS — I49.5 SICK SINUS SYNDROME (H): ICD-10-CM

## 2024-09-11 DIAGNOSIS — Z95.0 PACEMAKER: ICD-10-CM

## 2024-09-28 ENCOUNTER — HEALTH MAINTENANCE LETTER (OUTPATIENT)
Age: 74
End: 2024-09-28

## 2024-09-30 LAB
MDC_IDC_EPISODE_DTM: NORMAL
MDC_IDC_EPISODE_DURATION: 15 S
MDC_IDC_EPISODE_DURATION: 4 S
MDC_IDC_EPISODE_ID: NORMAL
MDC_IDC_EPISODE_TYPE: NORMAL
MDC_IDC_EPISODE_TYPE_INDUCED: NO
MDC_IDC_LEAD_CONNECTION_STATUS: NORMAL
MDC_IDC_LEAD_CONNECTION_STATUS: NORMAL
MDC_IDC_LEAD_IMPLANT_DT: NORMAL
MDC_IDC_LEAD_IMPLANT_DT: NORMAL
MDC_IDC_LEAD_LOCATION: NORMAL
MDC_IDC_LEAD_LOCATION: NORMAL
MDC_IDC_LEAD_LOCATION_DETAIL_1: NORMAL
MDC_IDC_LEAD_LOCATION_DETAIL_1: NORMAL
MDC_IDC_LEAD_MFG: NORMAL
MDC_IDC_LEAD_MFG: NORMAL
MDC_IDC_LEAD_MODEL: NORMAL
MDC_IDC_LEAD_MODEL: NORMAL
MDC_IDC_LEAD_POLARITY_TYPE: NORMAL
MDC_IDC_LEAD_POLARITY_TYPE: NORMAL
MDC_IDC_LEAD_SERIAL: NORMAL
MDC_IDC_LEAD_SERIAL: NORMAL
MDC_IDC_MSMT_BATTERY_DTM: NORMAL
MDC_IDC_MSMT_BATTERY_REMAINING_LONGEVITY: 18 MO
MDC_IDC_MSMT_BATTERY_REMAINING_PERCENTAGE: 32 %
MDC_IDC_MSMT_BATTERY_STATUS: NORMAL
MDC_IDC_MSMT_LEADCHNL_RA_IMPEDANCE_VALUE: 479 OHM
MDC_IDC_MSMT_LEADCHNL_RV_IMPEDANCE_VALUE: 637 OHM
MDC_IDC_MSMT_LEADCHNL_RV_PACING_THRESHOLD_AMPLITUDE: 0.7 V
MDC_IDC_MSMT_LEADCHNL_RV_PACING_THRESHOLD_PULSEWIDTH: 0.4 MS
MDC_IDC_PG_IMPLANT_DTM: NORMAL
MDC_IDC_PG_MFG: NORMAL
MDC_IDC_PG_MODEL: NORMAL
MDC_IDC_PG_SERIAL: NORMAL
MDC_IDC_PG_TYPE: NORMAL
MDC_IDC_SESS_CLINIC_NAME: NORMAL
MDC_IDC_SESS_DTM: NORMAL
MDC_IDC_SESS_TYPE: NORMAL
MDC_IDC_SET_BRADY_AT_MODE_SWITCH_MODE: NORMAL
MDC_IDC_SET_BRADY_AT_MODE_SWITCH_RATE: 150 {BEATS}/MIN
MDC_IDC_SET_BRADY_LOWRATE: 60 {BEATS}/MIN
MDC_IDC_SET_BRADY_MAX_SENSOR_RATE: 130 {BEATS}/MIN
MDC_IDC_SET_BRADY_MAX_TRACKING_RATE: 130 {BEATS}/MIN
MDC_IDC_SET_BRADY_MODE: NORMAL
MDC_IDC_SET_BRADY_PAV_DELAY_HIGH: 200 MS
MDC_IDC_SET_BRADY_PAV_DELAY_LOW: 250 MS
MDC_IDC_SET_BRADY_SAV_DELAY_HIGH: 200 MS
MDC_IDC_SET_BRADY_SAV_DELAY_LOW: 250 MS
MDC_IDC_SET_LEADCHNL_RA_PACING_AMPLITUDE: 1.5 V
MDC_IDC_SET_LEADCHNL_RA_PACING_POLARITY: NORMAL
MDC_IDC_SET_LEADCHNL_RA_PACING_PULSEWIDTH: 0.5 MS
MDC_IDC_SET_LEADCHNL_RA_SENSING_ADAPTATION_MODE: NORMAL
MDC_IDC_SET_LEADCHNL_RA_SENSING_POLARITY: NORMAL
MDC_IDC_SET_LEADCHNL_RA_SENSING_SENSITIVITY: 0.25 MV
MDC_IDC_SET_LEADCHNL_RV_PACING_AMPLITUDE: 1.2 V
MDC_IDC_SET_LEADCHNL_RV_PACING_CAPTURE_MODE: NORMAL
MDC_IDC_SET_LEADCHNL_RV_PACING_POLARITY: NORMAL
MDC_IDC_SET_LEADCHNL_RV_PACING_PULSEWIDTH: 0.4 MS
MDC_IDC_SET_LEADCHNL_RV_SENSING_ADAPTATION_MODE: NORMAL
MDC_IDC_SET_LEADCHNL_RV_SENSING_POLARITY: NORMAL
MDC_IDC_SET_LEADCHNL_RV_SENSING_SENSITIVITY: 0.6 MV
MDC_IDC_SET_ZONE_DETECTION_INTERVAL: 333 MS
MDC_IDC_SET_ZONE_STATUS: NORMAL
MDC_IDC_SET_ZONE_TYPE: NORMAL
MDC_IDC_SET_ZONE_VENDOR_TYPE: NORMAL
MDC_IDC_STAT_AT_BURDEN_PERCENT: 1 %
MDC_IDC_STAT_AT_DTM_END: NORMAL
MDC_IDC_STAT_AT_DTM_START: NORMAL
MDC_IDC_STAT_BRADY_DTM_END: NORMAL
MDC_IDC_STAT_BRADY_DTM_START: NORMAL
MDC_IDC_STAT_BRADY_RA_PERCENT_PACED: 32 %
MDC_IDC_STAT_BRADY_RV_PERCENT_PACED: 0 %
MDC_IDC_STAT_EPISODE_RECENT_COUNT: 0
MDC_IDC_STAT_EPISODE_RECENT_COUNT: 1
MDC_IDC_STAT_EPISODE_RECENT_COUNT: 1
MDC_IDC_STAT_EPISODE_RECENT_COUNT_DTM_END: NORMAL
MDC_IDC_STAT_EPISODE_RECENT_COUNT_DTM_START: NORMAL
MDC_IDC_STAT_EPISODE_TYPE: NORMAL
MDC_IDC_STAT_EPISODE_VENDOR_TYPE: NORMAL

## 2024-09-30 PROCEDURE — 93296 REM INTERROG EVL PM/IDS: CPT | Performed by: INTERNAL MEDICINE

## 2024-09-30 PROCEDURE — 93294 REM INTERROG EVL PM/LDLS PM: CPT | Performed by: INTERNAL MEDICINE

## 2025-04-05 ENCOUNTER — HEALTH MAINTENANCE LETTER (OUTPATIENT)
Age: 75
End: 2025-04-05

## 2025-04-11 ENCOUNTER — ANCILLARY PROCEDURE (OUTPATIENT)
Dept: CARDIOLOGY | Facility: CLINIC | Age: 75
End: 2025-04-11
Attending: INTERNAL MEDICINE
Payer: COMMERCIAL

## 2025-04-11 DIAGNOSIS — I49.5 SICK SINUS SYNDROME (H): ICD-10-CM

## 2025-04-11 DIAGNOSIS — Z95.0 PACEMAKER: ICD-10-CM

## 2025-04-11 DIAGNOSIS — I45.5 SINUS ARREST: ICD-10-CM

## 2025-04-11 LAB
MDC_IDC_LEAD_CONNECTION_STATUS: NORMAL
MDC_IDC_LEAD_CONNECTION_STATUS: NORMAL
MDC_IDC_LEAD_IMPLANT_DT: NORMAL
MDC_IDC_LEAD_IMPLANT_DT: NORMAL
MDC_IDC_LEAD_LOCATION: NORMAL
MDC_IDC_LEAD_LOCATION: NORMAL
MDC_IDC_LEAD_LOCATION_DETAIL_1: NORMAL
MDC_IDC_LEAD_LOCATION_DETAIL_1: NORMAL
MDC_IDC_LEAD_MFG: NORMAL
MDC_IDC_LEAD_MFG: NORMAL
MDC_IDC_LEAD_MODEL: NORMAL
MDC_IDC_LEAD_MODEL: NORMAL
MDC_IDC_LEAD_POLARITY_TYPE: NORMAL
MDC_IDC_LEAD_POLARITY_TYPE: NORMAL
MDC_IDC_LEAD_SERIAL: NORMAL
MDC_IDC_LEAD_SERIAL: NORMAL
MDC_IDC_MSMT_BATTERY_DTM: NORMAL
MDC_IDC_MSMT_BATTERY_REMAINING_LONGEVITY: 3 MO
MDC_IDC_MSMT_BATTERY_REMAINING_PERCENTAGE: 3 %
MDC_IDC_MSMT_BATTERY_STATUS: NORMAL
MDC_IDC_MSMT_LEADCHNL_RA_IMPEDANCE_VALUE: 440 OHM
MDC_IDC_MSMT_LEADCHNL_RA_PACING_THRESHOLD_AMPLITUDE: 0.3 V
MDC_IDC_MSMT_LEADCHNL_RA_PACING_THRESHOLD_PULSEWIDTH: 0.5 MS
MDC_IDC_MSMT_LEADCHNL_RA_SENSING_INTR_AMPL: 4.5 MV
MDC_IDC_MSMT_LEADCHNL_RV_IMPEDANCE_VALUE: 668 OHM
MDC_IDC_MSMT_LEADCHNL_RV_PACING_THRESHOLD_AMPLITUDE: 0.7 V
MDC_IDC_MSMT_LEADCHNL_RV_PACING_THRESHOLD_PULSEWIDTH: 0.4 MS
MDC_IDC_MSMT_LEADCHNL_RV_SENSING_INTR_AMPL: 16.1 MV
MDC_IDC_PG_IMPLANT_DTM: NORMAL
MDC_IDC_PG_MFG: NORMAL
MDC_IDC_PG_MODEL: NORMAL
MDC_IDC_PG_SERIAL: NORMAL
MDC_IDC_PG_TYPE: NORMAL
MDC_IDC_SESS_CLINIC_NAME: NORMAL
MDC_IDC_SESS_DTM: NORMAL
MDC_IDC_SESS_TYPE: NORMAL
MDC_IDC_SET_BRADY_AT_MODE_SWITCH_MODE: NORMAL
MDC_IDC_SET_BRADY_AT_MODE_SWITCH_RATE: 150 {BEATS}/MIN
MDC_IDC_SET_BRADY_LOWRATE: 60 {BEATS}/MIN
MDC_IDC_SET_BRADY_MAX_SENSOR_RATE: 130 {BEATS}/MIN
MDC_IDC_SET_BRADY_MAX_TRACKING_RATE: 130 {BEATS}/MIN
MDC_IDC_SET_BRADY_MODE: NORMAL
MDC_IDC_SET_BRADY_PAV_DELAY_HIGH: 200 MS
MDC_IDC_SET_BRADY_PAV_DELAY_LOW: 250 MS
MDC_IDC_SET_BRADY_SAV_DELAY_HIGH: 200 MS
MDC_IDC_SET_BRADY_SAV_DELAY_LOW: 250 MS
MDC_IDC_SET_LEADCHNL_RA_PACING_AMPLITUDE: 1.5 V
MDC_IDC_SET_LEADCHNL_RA_PACING_POLARITY: NORMAL
MDC_IDC_SET_LEADCHNL_RA_PACING_PULSEWIDTH: 0.5 MS
MDC_IDC_SET_LEADCHNL_RA_SENSING_ADAPTATION_MODE: NORMAL
MDC_IDC_SET_LEADCHNL_RA_SENSING_POLARITY: NORMAL
MDC_IDC_SET_LEADCHNL_RA_SENSING_SENSITIVITY: 0.25 MV
MDC_IDC_SET_LEADCHNL_RV_PACING_AMPLITUDE: 1.5 V
MDC_IDC_SET_LEADCHNL_RV_PACING_CAPTURE_MODE: NORMAL
MDC_IDC_SET_LEADCHNL_RV_PACING_POLARITY: NORMAL
MDC_IDC_SET_LEADCHNL_RV_PACING_PULSEWIDTH: 0.4 MS
MDC_IDC_SET_LEADCHNL_RV_SENSING_ADAPTATION_MODE: NORMAL
MDC_IDC_SET_LEADCHNL_RV_SENSING_POLARITY: NORMAL
MDC_IDC_SET_LEADCHNL_RV_SENSING_SENSITIVITY: 0.6 MV
MDC_IDC_SET_ZONE_DETECTION_INTERVAL: 333 MS
MDC_IDC_SET_ZONE_STATUS: NORMAL
MDC_IDC_SET_ZONE_TYPE: NORMAL
MDC_IDC_SET_ZONE_VENDOR_TYPE: NORMAL
MDC_IDC_STAT_AT_BURDEN_PERCENT: 1 %
MDC_IDC_STAT_AT_DTM_END: NORMAL
MDC_IDC_STAT_AT_DTM_START: NORMAL
MDC_IDC_STAT_AT_MODE_SW_COUNT: 4
MDC_IDC_STAT_BRADY_DTM_END: NORMAL
MDC_IDC_STAT_BRADY_DTM_START: NORMAL
MDC_IDC_STAT_BRADY_RA_PERCENT_PACED: 30 %
MDC_IDC_STAT_BRADY_RV_PERCENT_PACED: 0 %
MDC_IDC_STAT_EPISODE_RECENT_COUNT: 0
MDC_IDC_STAT_EPISODE_RECENT_COUNT: 2
MDC_IDC_STAT_EPISODE_RECENT_COUNT_DTM_END: NORMAL
MDC_IDC_STAT_EPISODE_RECENT_COUNT_DTM_END: NORMAL
MDC_IDC_STAT_EPISODE_RECENT_COUNT_DTM_START: NORMAL
MDC_IDC_STAT_EPISODE_RECENT_COUNT_DTM_START: NORMAL
MDC_IDC_STAT_EPISODE_TYPE: NORMAL
MDC_IDC_STAT_EPISODE_TYPE: NORMAL
MDC_IDC_STAT_EPISODE_VENDOR_TYPE: NORMAL
MDC_IDC_STAT_EPISODE_VENDOR_TYPE: NORMAL

## 2025-04-11 PROCEDURE — 93280 PM DEVICE PROGR EVAL DUAL: CPT | Performed by: INTERNAL MEDICINE

## 2025-07-18 ENCOUNTER — ANCILLARY PROCEDURE (OUTPATIENT)
Dept: CARDIOLOGY | Facility: CLINIC | Age: 75
End: 2025-07-18
Attending: INTERNAL MEDICINE
Payer: COMMERCIAL

## 2025-07-18 DIAGNOSIS — I49.5 SICK SINUS SYNDROME (H): ICD-10-CM

## 2025-07-18 DIAGNOSIS — Z95.0 CARDIAC PACEMAKER IN SITU: ICD-10-CM

## 2025-07-20 LAB
MDC_IDC_EPISODE_DTM: NORMAL
MDC_IDC_EPISODE_ID: NORMAL
MDC_IDC_EPISODE_TYPE: NORMAL
MDC_IDC_LEAD_CONNECTION_STATUS: NORMAL
MDC_IDC_LEAD_CONNECTION_STATUS: NORMAL
MDC_IDC_LEAD_IMPLANT_DT: NORMAL
MDC_IDC_LEAD_IMPLANT_DT: NORMAL
MDC_IDC_LEAD_LOCATION: NORMAL
MDC_IDC_LEAD_LOCATION: NORMAL
MDC_IDC_LEAD_LOCATION_DETAIL_1: NORMAL
MDC_IDC_LEAD_LOCATION_DETAIL_1: NORMAL
MDC_IDC_LEAD_MFG: NORMAL
MDC_IDC_LEAD_MFG: NORMAL
MDC_IDC_LEAD_MODEL: NORMAL
MDC_IDC_LEAD_MODEL: NORMAL
MDC_IDC_LEAD_POLARITY_TYPE: NORMAL
MDC_IDC_LEAD_POLARITY_TYPE: NORMAL
MDC_IDC_LEAD_SERIAL: NORMAL
MDC_IDC_LEAD_SERIAL: NORMAL
MDC_IDC_MSMT_BATTERY_DTM: NORMAL
MDC_IDC_MSMT_BATTERY_REMAINING_LONGEVITY: 3 MO
MDC_IDC_MSMT_BATTERY_REMAINING_PERCENTAGE: 1 %
MDC_IDC_MSMT_BATTERY_STATUS: NORMAL
MDC_IDC_MSMT_LEADCHNL_RA_IMPEDANCE_VALUE: 400 OHM
MDC_IDC_MSMT_LEADCHNL_RV_IMPEDANCE_VALUE: 670 OHM
MDC_IDC_PG_IMPLANT_DTM: NORMAL
MDC_IDC_PG_MFG: NORMAL
MDC_IDC_PG_MODEL: NORMAL
MDC_IDC_PG_SERIAL: NORMAL
MDC_IDC_PG_TYPE: NORMAL
MDC_IDC_SESS_CLINIC_NAME: NORMAL
MDC_IDC_SESS_DTM: NORMAL
MDC_IDC_SESS_TYPE: NORMAL
MDC_IDC_SET_BRADY_AT_MODE_SWITCH_MODE: NORMAL
MDC_IDC_SET_BRADY_AT_MODE_SWITCH_RATE: 150 {BEATS}/MIN
MDC_IDC_SET_BRADY_LOWRATE: 60 {BEATS}/MIN
MDC_IDC_SET_BRADY_MAX_SENSOR_RATE: 130 {BEATS}/MIN
MDC_IDC_SET_BRADY_MAX_TRACKING_RATE: 130 {BEATS}/MIN
MDC_IDC_SET_BRADY_MODE: NORMAL
MDC_IDC_SET_BRADY_PAV_DELAY_HIGH: 200 MS
MDC_IDC_SET_BRADY_PAV_DELAY_LOW: 250 MS
MDC_IDC_SET_BRADY_SAV_DELAY_HIGH: 200 MS
MDC_IDC_SET_BRADY_SAV_DELAY_LOW: 250 MS
MDC_IDC_SET_LEADCHNL_RA_PACING_AMPLITUDE: 1.5 V
MDC_IDC_SET_LEADCHNL_RA_PACING_POLARITY: NORMAL
MDC_IDC_SET_LEADCHNL_RA_PACING_PULSEWIDTH: 0.5 MS
MDC_IDC_SET_LEADCHNL_RA_SENSING_ADAPTATION_MODE: NORMAL
MDC_IDC_SET_LEADCHNL_RA_SENSING_POLARITY: NORMAL
MDC_IDC_SET_LEADCHNL_RA_SENSING_SENSITIVITY: 0.25 MV
MDC_IDC_SET_LEADCHNL_RV_PACING_AMPLITUDE: 1.5 V
MDC_IDC_SET_LEADCHNL_RV_PACING_CAPTURE_MODE: NORMAL
MDC_IDC_SET_LEADCHNL_RV_PACING_POLARITY: NORMAL
MDC_IDC_SET_LEADCHNL_RV_PACING_PULSEWIDTH: 0.4 MS
MDC_IDC_SET_LEADCHNL_RV_SENSING_ADAPTATION_MODE: NORMAL
MDC_IDC_SET_LEADCHNL_RV_SENSING_POLARITY: NORMAL
MDC_IDC_SET_LEADCHNL_RV_SENSING_SENSITIVITY: 0.6 MV
MDC_IDC_SET_ZONE_DETECTION_INTERVAL: 333 MS
MDC_IDC_SET_ZONE_STATUS: NORMAL
MDC_IDC_SET_ZONE_TYPE: NORMAL
MDC_IDC_SET_ZONE_VENDOR_TYPE: NORMAL
MDC_IDC_STAT_AT_BURDEN_PERCENT: 0 %
MDC_IDC_STAT_AT_DTM_END: NORMAL
MDC_IDC_STAT_AT_DTM_START: NORMAL
MDC_IDC_STAT_BRADY_DTM_END: NORMAL
MDC_IDC_STAT_BRADY_DTM_START: NORMAL
MDC_IDC_STAT_BRADY_RA_PERCENT_PACED: 6 %
MDC_IDC_STAT_BRADY_RV_PERCENT_PACED: 0 %
MDC_IDC_STAT_EPISODE_RECENT_COUNT: 0
MDC_IDC_STAT_EPISODE_RECENT_COUNT: 0
MDC_IDC_STAT_EPISODE_RECENT_COUNT: 1
MDC_IDC_STAT_EPISODE_RECENT_COUNT_DTM_END: NORMAL
MDC_IDC_STAT_EPISODE_RECENT_COUNT_DTM_START: NORMAL
MDC_IDC_STAT_EPISODE_TYPE: NORMAL
MDC_IDC_STAT_EPISODE_VENDOR_TYPE: NORMAL

## 2025-07-20 PROCEDURE — 93294 REM INTERROG EVL PM/LDLS PM: CPT | Performed by: INTERNAL MEDICINE

## 2025-07-20 PROCEDURE — 93296 REM INTERROG EVL PM/IDS: CPT | Performed by: INTERNAL MEDICINE

## 2025-08-26 ENCOUNTER — TELEPHONE (OUTPATIENT)
Dept: CARDIOLOGY | Facility: CLINIC | Age: 75
End: 2025-08-26

## 2025-08-26 ENCOUNTER — ANCILLARY PROCEDURE (OUTPATIENT)
Dept: CARDIOLOGY | Facility: CLINIC | Age: 75
End: 2025-08-26
Attending: INTERNAL MEDICINE
Payer: COMMERCIAL

## 2025-08-26 ENCOUNTER — PREP FOR PROCEDURE (OUTPATIENT)
Dept: CARDIOLOGY | Facility: CLINIC | Age: 75
End: 2025-08-26

## 2025-08-26 ENCOUNTER — DOCUMENTATION ONLY (OUTPATIENT)
Dept: CARDIOLOGY | Facility: CLINIC | Age: 75
End: 2025-08-26

## 2025-08-26 DIAGNOSIS — I49.5 SICK SINUS SYNDROME (H): ICD-10-CM

## 2025-08-26 DIAGNOSIS — Z95.0 PACEMAKER: ICD-10-CM

## 2025-08-26 DIAGNOSIS — I45.5 SINUS ARREST: ICD-10-CM

## 2025-08-26 DIAGNOSIS — Z95.0 CARDIAC PACEMAKER IN SITU: Primary | ICD-10-CM

## 2025-08-26 LAB
MDC_IDC_EPISODE_DTM: NORMAL
MDC_IDC_EPISODE_ID: NORMAL
MDC_IDC_EPISODE_TYPE: NORMAL
MDC_IDC_LEAD_CONNECTION_STATUS: NORMAL
MDC_IDC_LEAD_CONNECTION_STATUS: NORMAL
MDC_IDC_LEAD_IMPLANT_DT: NORMAL
MDC_IDC_LEAD_IMPLANT_DT: NORMAL
MDC_IDC_LEAD_LOCATION: NORMAL
MDC_IDC_LEAD_LOCATION: NORMAL
MDC_IDC_LEAD_LOCATION_DETAIL_1: NORMAL
MDC_IDC_LEAD_LOCATION_DETAIL_1: NORMAL
MDC_IDC_LEAD_MFG: NORMAL
MDC_IDC_LEAD_MFG: NORMAL
MDC_IDC_LEAD_MODEL: NORMAL
MDC_IDC_LEAD_MODEL: NORMAL
MDC_IDC_LEAD_POLARITY_TYPE: NORMAL
MDC_IDC_LEAD_POLARITY_TYPE: NORMAL
MDC_IDC_LEAD_SERIAL: NORMAL
MDC_IDC_LEAD_SERIAL: NORMAL
MDC_IDC_MSMT_BATTERY_DTM: NORMAL
MDC_IDC_MSMT_BATTERY_STATUS: NORMAL
MDC_IDC_MSMT_LEADCHNL_RA_IMPEDANCE_VALUE: 430 OHM
MDC_IDC_MSMT_LEADCHNL_RV_IMPEDANCE_VALUE: 643 OHM
MDC_IDC_PG_IMPLANT_DTM: NORMAL
MDC_IDC_PG_MFG: NORMAL
MDC_IDC_PG_MODEL: NORMAL
MDC_IDC_PG_SERIAL: NORMAL
MDC_IDC_PG_TYPE: NORMAL
MDC_IDC_SESS_CLINIC_NAME: NORMAL
MDC_IDC_SESS_DTM: NORMAL
MDC_IDC_SESS_TYPE: NORMAL
MDC_IDC_SET_BRADY_AT_MODE_SWITCH_MODE: NORMAL
MDC_IDC_SET_BRADY_AT_MODE_SWITCH_RATE: 150 {BEATS}/MIN
MDC_IDC_SET_BRADY_LOWRATE: 60 {BEATS}/MIN
MDC_IDC_SET_BRADY_MAX_SENSOR_RATE: 130 {BEATS}/MIN
MDC_IDC_SET_BRADY_MAX_TRACKING_RATE: 130 {BEATS}/MIN
MDC_IDC_SET_BRADY_MODE: NORMAL
MDC_IDC_SET_BRADY_PAV_DELAY_HIGH: 200 MS
MDC_IDC_SET_BRADY_PAV_DELAY_LOW: 250 MS
MDC_IDC_SET_BRADY_SAV_DELAY_HIGH: 200 MS
MDC_IDC_SET_BRADY_SAV_DELAY_LOW: 250 MS
MDC_IDC_SET_LEADCHNL_RA_PACING_AMPLITUDE: 1.5 V
MDC_IDC_SET_LEADCHNL_RA_PACING_POLARITY: NORMAL
MDC_IDC_SET_LEADCHNL_RA_PACING_PULSEWIDTH: 0.5 MS
MDC_IDC_SET_LEADCHNL_RA_SENSING_ADAPTATION_MODE: NORMAL
MDC_IDC_SET_LEADCHNL_RA_SENSING_POLARITY: NORMAL
MDC_IDC_SET_LEADCHNL_RA_SENSING_SENSITIVITY: 0.25 MV
MDC_IDC_SET_LEADCHNL_RV_PACING_AMPLITUDE: 1.5 V
MDC_IDC_SET_LEADCHNL_RV_PACING_CAPTURE_MODE: NORMAL
MDC_IDC_SET_LEADCHNL_RV_PACING_POLARITY: NORMAL
MDC_IDC_SET_LEADCHNL_RV_PACING_PULSEWIDTH: 0.4 MS
MDC_IDC_SET_LEADCHNL_RV_SENSING_ADAPTATION_MODE: NORMAL
MDC_IDC_SET_LEADCHNL_RV_SENSING_POLARITY: NORMAL
MDC_IDC_SET_LEADCHNL_RV_SENSING_SENSITIVITY: 0.6 MV
MDC_IDC_SET_ZONE_DETECTION_INTERVAL: 333 MS
MDC_IDC_SET_ZONE_STATUS: NORMAL
MDC_IDC_SET_ZONE_TYPE: NORMAL
MDC_IDC_SET_ZONE_VENDOR_TYPE: NORMAL
MDC_IDC_STAT_AT_BURDEN_PERCENT: 0 %
MDC_IDC_STAT_AT_DTM_END: NORMAL
MDC_IDC_STAT_AT_DTM_START: NORMAL
MDC_IDC_STAT_BRADY_DTM_END: NORMAL
MDC_IDC_STAT_BRADY_DTM_START: NORMAL
MDC_IDC_STAT_BRADY_RA_PERCENT_PACED: 5 %
MDC_IDC_STAT_BRADY_RV_PERCENT_PACED: 0 %
MDC_IDC_STAT_EPISODE_RECENT_COUNT: 0
MDC_IDC_STAT_EPISODE_RECENT_COUNT_DTM_END: NORMAL
MDC_IDC_STAT_EPISODE_RECENT_COUNT_DTM_START: NORMAL
MDC_IDC_STAT_EPISODE_TYPE: NORMAL
MDC_IDC_STAT_EPISODE_VENDOR_TYPE: NORMAL

## 2025-08-26 RX ORDER — LIDOCAINE 40 MG/G
CREAM TOPICAL
OUTPATIENT
Start: 2025-08-26

## 2025-08-26 RX ORDER — SODIUM CHLORIDE 9 MG/ML
100 INJECTION, SOLUTION INTRAVENOUS CONTINUOUS
OUTPATIENT
Start: 2025-08-26

## 2025-08-26 RX ORDER — FENTANYL CITRATE 50 UG/ML
25 INJECTION, SOLUTION INTRAMUSCULAR; INTRAVENOUS
Refills: 0 | OUTPATIENT
Start: 2025-08-26

## 2025-08-27 ENCOUNTER — TELEPHONE (OUTPATIENT)
Dept: CARDIOLOGY | Facility: CLINIC | Age: 75
End: 2025-08-27
Payer: COMMERCIAL